# Patient Record
Sex: FEMALE | Race: WHITE | NOT HISPANIC OR LATINO | Employment: FULL TIME | URBAN - METROPOLITAN AREA
[De-identification: names, ages, dates, MRNs, and addresses within clinical notes are randomized per-mention and may not be internally consistent; named-entity substitution may affect disease eponyms.]

---

## 2017-01-03 ENCOUNTER — OFFICE VISIT (OUTPATIENT)
Dept: INTERNAL MEDICINE | Facility: CLINIC | Age: 57
End: 2017-01-03

## 2017-01-03 VITALS
BODY MASS INDEX: 18.29 KG/M2 | TEMPERATURE: 98.2 F | WEIGHT: 109.8 LBS | SYSTOLIC BLOOD PRESSURE: 99 MMHG | OXYGEN SATURATION: 98 % | HEIGHT: 65 IN | DIASTOLIC BLOOD PRESSURE: 62 MMHG | HEART RATE: 94 BPM

## 2017-01-03 DIAGNOSIS — I21.4 NSTEMI (NON-ST ELEVATED MYOCARDIAL INFARCTION) (H): ICD-10-CM

## 2017-01-03 DIAGNOSIS — J96.01 ACUTE RESPIRATORY FAILURE WITH HYPOXIA (H): ICD-10-CM

## 2017-01-03 DIAGNOSIS — Z09 HOSPITAL DISCHARGE FOLLOW-UP: Primary | ICD-10-CM

## 2017-01-03 PROCEDURE — 99214 OFFICE O/P EST MOD 30 MIN: CPT | Performed by: INTERNAL MEDICINE

## 2017-01-03 NOTE — PROGRESS NOTES
"  SUBJECTIVE:                                                    Bailee Dixon is a 56 year old female who presents to clinic today for the following health issues:      ED/UC Followup:    Facility:  [facility]  Date of visit: 12/17/2016  Reason for visit: cardiomyopath  Current Status: Pt is doing okay now       {additional problems for provider to add:689368}    Problem list and histories reviewed & adjusted, as indicated.  Additional history: {NONE - AS DOCUMENTED:050891::\"as documented\"}    {HIST REVIEW/ LINKS 2:107591}    {PROVIDER CHARTING PREFERENCE:557690}  .  "

## 2017-01-03 NOTE — NURSING NOTE
"Chief Complaint   Patient presents with     RECHECK     Pt wants to discuss with Dr about her concerns       Initial BP 99/62 mmHg  Pulse 94  Temp(Src) 98.2  F (36.8  C) (Oral)  Ht 5' 5\" (1.651 m)  Wt 109 lb 12.8 oz (49.805 kg)  BMI 18.27 kg/m2  SpO2 98%  Breastfeeding? No Estimated body mass index is 18.27 kg/(m^2) as calculated from the following:    Height as of this encounter: 5' 5\" (1.651 m).    Weight as of this encounter: 109 lb 12.8 oz (49.805 kg).  BP completed using cuff size: regular  Carlitos Sales MA      "

## 2017-01-03 NOTE — PROGRESS NOTES
SUBJECTIVE:                                                    Bailee Dixon is a 56 year old female who presents to clinic today for the following health issues:          Hospital Follow-up Visit:    Hospital/Nursing Home/IP Rehab Facility: [facility]  Date of Admission: 12/18/16  Date of Discharge: 12/20/2016  Reason(s) for Admission: SOB            Problems taking medications regularly:  None       Medication changes since discharge: None       Problems adhering to non-medication therapy:  None    Summary of hospitalization:  [facility] discharge summary reviewed  Diagnostic Tests/Treatments reviewed.  Follow up needed: none  Other Healthcare Providers Involved in Patient s Care:         None  Update since discharge: improved.     Post Discharge Medication Reconciliation: discharge medications reconciled, continue medications without change.  Plan of care communicated with patient     Coding guidelines for this visit:  Type of Medical   Decision Making Face-to-Face Visit       within 7 Days of discharge Face-to-Face Visit        within 14 days of discharge   Moderate Complexity 00586 [id number]   High Complexity 02587 [id number]          Patient is seen for a follow up visit.  Recently hospitalized for SOB. Patient had acute respiratory failure and NSTEMI. Was admitted to ICU  Had elevated Troponin. Angiogram did not show stenosis to require stenting. Gradually improved.   Started on statin , aspirin, plavix, coreg and lisinopril. No recurrent symptoms.   Prior to that has had 3 weeks symptoms of cough, phlegm, fever and SOB. Seen in [facility] and treated for pneumonia with Levaquin. Required repeated treatment as symptoms persisted.   Did not have a CXR initially. CXR in the hospital did not show infiltrate.   No prior history of HTN, hyperlipidemia. Has h/o smoking but has quit 5 years ago.   Her BP has been low since started on BP medications and she has stopped taking them a week ago.  "        Problem list and histories reviewed & adjusted, as indicated.  Additional history: none    Problem list, Medication list, Allergies, and Medical/Social/Surgical histories reviewed in EPIC and updated as appropriate.    ROS:  Constitutional, HEENT, cardiovascular, pulmonary, GI, , musculoskeletal, neuro, skin, endocrine and psych systems are negative, except as otherwise noted.    OBJECTIVE:                                                    BP 99/62 mmHg  Pulse 94  Temp(Src) 98.2  F (36.8  C) (Oral)  Ht 5' 5\" (1.651 m)  Wt 109 lb 12.8 oz (49.805 kg)  BMI 18.27 kg/m2  SpO2 98%  Breastfeeding? No  Body mass index is 18.27 kg/(m^2).  GENERAL: healthy, alert and no distress  NECK: no adenopathy, no asymmetry, masses, or scars and thyroid normal to palpation  RESP: lungs clear to auscultation - no rales, rhonchi or wheezes  CV: regular rate and rhythm, normal S1 S2, no S3 or S4, no murmur, click or rub, no peripheral edema and peripheral pulses strong  ABDOMEN: soft, nontender, no hepatosplenomegaly, no masses and bowel sounds normal  MS: no gross musculoskeletal defects noted, no edema    Diagnostic Test Results:  none      ASSESSMENT/PLAN:                                                      Problem List Items Addressed This Visit     Acute respiratory failure (H)    NSTEMI (non-ST elevated myocardial infarction) (H)      Other Visit Diagnoses     Hospital discharge follow-up    -  Primary            Will hold Coreg and Lisinopril - low BP.   Continue ASA, Plavix and Lipitor post MI  MI likely related to acute inflammation triggered by viral / bacterial respiratory infection with resultant respiratory failure , hypoxia   Recheck ECHO in 2 months     Follow-Up:in 2 months     Robert Singh MD  [facility]    "

## 2017-01-19 ENCOUNTER — MYC MEDICAL ADVICE (OUTPATIENT)
Dept: INTERNAL MEDICINE | Facility: CLINIC | Age: 57
End: 2017-01-19

## 2017-01-20 NOTE — TELEPHONE ENCOUNTER
Pt wondering what prescriptions were ordered at her visits on 12/2/16 and 12/14/16. Message sent relaying requested information.

## 2017-02-27 ENCOUNTER — TELEPHONE (OUTPATIENT)
Dept: PHARMACY | Facility: CLINIC | Age: 57
End: 2017-02-27

## 2017-02-27 NOTE — TELEPHONE ENCOUNTER
Called to follow-up on GILL visit.  Left message for patient to call back.    Hien Bonilla , Pharm D  [phone] (phone)  [phone] (pager)  Medication Therapy Management Pharmacist

## 2017-03-20 ENCOUNTER — TELEPHONE (OUTPATIENT)
Dept: INTERNAL MEDICINE | Facility: CLINIC | Age: 57
End: 2017-03-20

## 2017-03-20 NOTE — TELEPHONE ENCOUNTER
Panel Management Review      Patient has the following on her problem list: None      Composite cancer screening  Chart review shows that this patient is due/due soon for the following Colonoscopy  Summary:    Patient is due/failing the following:   COLONOSCOPY    Action needed:   Patient needs referral/order: Colonoscopy    Type of outreach:    Phone, left message for patient to call back.     Questions for provider review:    None                                                                                                                                    Yvonne Hassan MA       Chart routed to none .

## 2017-03-20 NOTE — LETTER
[facility]  [address], [address]  [place]  [address]      March 24, 2017    Bailee Dixon                                                                                                                                                       [address] [address] [address]              Dear Bailee,      At [facility] we care about your health and well-being. In order to ensure we are providing the best quality care, we have reviewed your chart and see that you are due for:    1. Colonoscopy    If you have already had one or all of the above screening tests at another facility, please contact our office to update your chart at [phone].    GI : [phone] to schedule Colonoscopy               Sincerely,      Robert Singh M.D.

## 2017-04-10 ENCOUNTER — OFFICE VISIT (OUTPATIENT)
Dept: INTERNAL MEDICINE | Facility: CLINIC | Age: 57
End: 2017-04-10
Payer: COMMERCIAL

## 2017-04-10 ENCOUNTER — RADIANT APPOINTMENT (OUTPATIENT)
Dept: GENERAL RADIOLOGY | Facility: CLINIC | Age: 57
End: 2017-04-10
Attending: INTERNAL MEDICINE
Payer: COMMERCIAL

## 2017-04-10 VITALS
HEART RATE: 107 BPM | TEMPERATURE: 98.2 F | BODY MASS INDEX: 18.99 KG/M2 | SYSTOLIC BLOOD PRESSURE: 114 MMHG | WEIGHT: 114 LBS | HEIGHT: 65 IN | DIASTOLIC BLOOD PRESSURE: 72 MMHG | OXYGEN SATURATION: 97 %

## 2017-04-10 DIAGNOSIS — R06.03 RESPIRATORY DISTRESS: ICD-10-CM

## 2017-04-10 DIAGNOSIS — I25.2 HISTORY OF MI (MYOCARDIAL INFARCTION): ICD-10-CM

## 2017-04-10 DIAGNOSIS — B02.9 HERPES ZOSTER WITHOUT COMPLICATION: ICD-10-CM

## 2017-04-10 DIAGNOSIS — J06.9 UPPER RESPIRATORY TRACT INFECTION, UNSPECIFIED TYPE: Primary | ICD-10-CM

## 2017-04-10 PROCEDURE — 99214 OFFICE O/P EST MOD 30 MIN: CPT | Performed by: INTERNAL MEDICINE

## 2017-04-10 PROCEDURE — 71020 XR CHEST 2 VW: CPT

## 2017-04-10 RX ORDER — CODEINE PHOSPHATE AND GUAIFENESIN 10; 100 MG/5ML; MG/5ML
1 SOLUTION ORAL EVERY 4 HOURS PRN
Qty: 120 ML | Refills: 0 | Status: SHIPPED | OUTPATIENT
Start: 2017-04-10 | End: 2017-05-05

## 2017-04-10 RX ORDER — LORAZEPAM 0.5 MG/1
0.5 TABLET ORAL EVERY 8 HOURS PRN
Qty: 15 TABLET | Refills: 0 | Status: SHIPPED | OUTPATIENT
Start: 2017-04-10 | End: 2017-05-05

## 2017-04-10 RX ORDER — ALBUTEROL SULFATE 90 UG/1
2 AEROSOL, METERED RESPIRATORY (INHALATION) EVERY 6 HOURS PRN
Qty: 1 INHALER | Refills: 0 | Status: SHIPPED | OUTPATIENT
Start: 2017-04-10 | End: 2017-05-05 | Stop reason: ALTCHOICE

## 2017-04-10 NOTE — NURSING NOTE
"Chief Complaint   Patient presents with     Cough     Pt C/O cough for over a week, worst at night, SOB and wheezing     Derm Problem     Rash on Lt torso, possible shingles       Initial /72  Pulse 107  Temp 98.2  F (36.8  C) (Oral)  Ht 5' 5\" (1.651 m)  Wt 114 lb (51.7 kg)  SpO2 97%  BMI 18.97 kg/m2 Estimated body mass index is 18.97 kg/(m^2) as calculated from the following:    Height as of this encounter: 5' 5\" (1.651 m).    Weight as of this encounter: 114 lb (51.7 kg).  Medication Reconciliation: complete   Yvonne Hassan MA      "

## 2017-04-10 NOTE — MR AVS SNAPSHOT
After Visit Summary   4/10/2017    Bailee Dixon    MRN: [id number]           Patient Information     Date Of Birth          1960        Visit Information        Provider Department      4/10/2017 11:20 AM Robert Singh MD [facility]        Today's Diagnoses     Upper respiratory tract infection, unspecified type    -  1    Respiratory distress        Herpes zoster without complication        History of MI (myocardial infarction)           Follow-ups after your visit        Your next 10 appointments already scheduled     Apr 19, 2017  9:00 AM CDT   Ech Complete with [facility] ([facility])    [address]  [facility] [address] [phone]           1.  Please bring or wear a comfortable two-piece outfit. 2.  You may eat, drink and take your normal medicines. 3.  For any questions that cannot be answered, please contact the ordering physician ***Please check-in at the [facility] Registration Office located in [address] in the [facility] building. When you are finished registering, please go to [address] and have a seat. The technician will call your name for the test.            Apr 19, 2017 10:00 AM CDT   LAB with RU LAB   [facility] PHYSICIANS [facility] ([facility])    [address]  [facility] [id number] [phone]           Patient must bring picture ID.  Patient should be prepared to give a urine specimen  Please do not eat 10-12 hours before your appointment if you are coming in fasting for labs on lipids, cholesterol, or glucose (sugar).  Pregnant women should follow their Care Team instructions. Water with medications is okay. Do not drink coffee or other fluids.   If you have concerns about taking  your medications, please ask at office or if scheduling via [employer], send a message by clicking on Secure Messaging, Message  "Your Care Team.            May 11, 2017 12:45 PM CDT   New Visit with Julius Peres MD   [facility] [place] ([facility])    [address]  [facility] [address] [phone]              Who to contact     If you have questions or need follow up information about today's clinic visit or your schedule please contact [facility] directly at [phone].  Normal or non-critical lab and imaging results will be communicated to you by [employer], letter or phone within 4 business days after the clinic has received the results. If you do not hear from us within 7 days, please contact the clinic through [employer] or phone. If you have a critical or abnormal lab result, we will notify you by phone as soon as possible.  Submit refill requests through [employer] or call your pharmacy and they will forward the refill request to us. Please allow 3 business days for your refill to be completed.          Additional Information About Your Visit        [employer] Information     [employer] gives you secure access to your electronic health record. If you see a primary care provider, you can also send messages to your care team and make appointments. If you have questions, please call your primary care clinic.  If you do not have a primary care provider, please call [phone] and they will assist you.        Care EveryWhere ID     This is your Care EveryWhere ID. This could be used by other organizations to access your [facility] medical records  [id number]        Your Vitals Were     Pulse Temperature Height Pulse Oximetry BMI (Body Mass Index)       107 98.2  F (36.8  C) (Oral) 5' 5\" (1.651 m) 97% 18.97 kg/m2        Blood Pressure from Last 3 Encounters:   04/10/17 114/72   01/03/17 99/62   12/20/16 121/75    Weight from Last 3 Encounters:   04/10/17 114 lb (51.7 kg)   01/03/17 109 lb 12.8 oz (49.8 kg)   12/20/16 111 lb 14.4 oz (50.8 kg)                 Today's " Medication Changes          These changes are accurate as of: 4/10/17  2:48 PM.  If you have any questions, ask your nurse or doctor.               Start taking these medicines.        Dose/Directions    amoxicillin-clavulanate 875-125 MG per tablet   Commonly known as:  AUGMENTIN   Used for:  Upper respiratory tract infection, unspecified type   Started by:  Robert Singh MD        Dose:  1 tablet   Take 1 tablet by mouth 2 times daily   Quantity:  20 tablet   Refills:  0       guaiFENesin-codeine 100-10 MG/5ML Soln solution   Commonly known as:  ROBITUSSIN AC   Used for:  Upper respiratory tract infection, unspecified type   Started by:  Robert Singh MD        Dose:  1 tsp.   Take 5 mLs by mouth every 4 hours as needed for cough   Quantity:  120 mL   Refills:  0         These medicines have changed or have updated prescriptions.        Dose/Directions    * albuterol 108 (90 BASE) MCG/ACT Inhaler   Commonly known as:  PROAIR HFA/PROVENTIL HFA/VENTOLIN HFA   This may have changed:  Another medication with the same name was added. Make sure you understand how and when to take each.   Used for:  Pneumonia of left lower lobe due to infectious organism   Changed by:  Hien Bonilla, [facility]        Dose:  2 puff   Inhale 2 puffs into the lungs every 6 hours as needed for shortness of breath / dyspnea or wheezing   Quantity:  1 Inhaler   Refills:  0       * albuterol 108 (90 BASE) MCG/ACT Inhaler   Commonly known as:  PROAIR HFA/PROVENTIL HFA/VENTOLIN HFA   This may have changed:  You were already taking a medication with the same name, and this prescription was added. Make sure you understand how and when to take each.   Used for:  Upper respiratory tract infection, unspecified type   Changed by:  Robert Singh MD        Dose:  2 puff   Inhale 2 puffs into the lungs every 6 hours as needed for shortness of breath / dyspnea or wheezing   Quantity:  1 Inhaler   Refills:  0       * Notice:  This list has 2  medication(s) that are the same as other medications prescribed for you. Read the directions carefully, and ask your doctor or other care provider to review them with you.         Where to get your medicines      These medications were sent to [facility]/pharmacy #[id number] - [place], [place] - [address] [address]  [address] [address] [address]     Phone:  [phone]     albuterol 108 (90 BASE) MCG/ACT Inhaler    amoxicillin-clavulanate 875-125 MG per tablet         Some of these will need a paper prescription and others can be bought over the counter.  Ask your nurse if you have questions.     Bring a paper prescription for each of these medications     guaiFENesin-codeine 100-10 MG/5ML Soln solution    LORazepam 0.5 MG tablet                Primary Care Provider Office Phone # Fax #    Robert Singh [phone]       [facility] [address] [place] [address]        Thank you!     Thank you for choosing [facility]  for your care. Our goal is always to provide you with excellent care. Hearing back from our patients is one way we can continue to improve our services. Please take a few minutes to complete the written survey that you may receive in the mail after your visit with us. Thank you!             Your Updated Medication List - Protect others around you: Learn how to safely use, store and throw away your medicines at www.disposemymeds.org.          This list is accurate as of: 4/10/17  2:48 PM.  Always use your most recent med list.                   Brand Name Dispense Instructions for use    * albuterol 108 (90 BASE) MCG/ACT Inhaler    PROAIR HFA/PROVENTIL HFA/VENTOLIN HFA    1 Inhaler    Inhale 2 puffs into the lungs every 6 hours as needed for shortness of breath / dyspnea or wheezing       * albuterol 108 (90 BASE) MCG/ACT Inhaler    PROAIR HFA/PROVENTIL HFA/VENTOLIN HFA    1 Inhaler    Inhale 2 puffs into the lungs every 6 hours as needed for  shortness of breath / dyspnea or wheezing       amoxicillin-clavulanate 875-125 MG per tablet    AUGMENTIN    20 tablet    Take 1 tablet by mouth 2 times daily       aspirin 81 MG EC tablet     30 tablet    Take 1 tablet (81 mg) by mouth daily       atorvastatin 40 MG tablet    LIPITOR    30 tablet    Take 1 tablet (40 mg) by mouth daily       carvedilol 3.125 MG tablet    COREG    60 tablet    Take 1 tablet (3.125 mg) by mouth 2 times daily (with meals)       clopidogrel 75 MG tablet    PLAVIX    30 tablet    Take 1 tablet (75 mg) by mouth daily       EPINEPHrine 0.3 MG/0.3ML injection     1 each    Inject 0.3 mLs (0.3 mg) into the muscle once as needed for anaphylaxis       guaiFENesin-codeine 100-10 MG/5ML Soln solution    ROBITUSSIN AC    120 mL    Take 5 mLs by mouth every 4 hours as needed for cough       lisinopril 2.5 MG tablet    PRINIVIL/Zestril    30 tablet    Take 1 tablet (2.5 mg) by mouth daily       LORazepam 0.5 MG tablet    ATIVAN    15 tablet    Take 1 tablet (0.5 mg) by mouth every 8 hours as needed for anxiety       PREMARIN 0.625 MG tablet   Generic drug:  estrogens (conjugated)     90    1 daily       * Notice:  This list has 2 medication(s) that are the same as other medications prescribed for you. Read the directions carefully, and ask your doctor or other care provider to review them with you.

## 2017-04-10 NOTE — PROGRESS NOTES
SUBJECTIVE:                                                    Bailee Dixon is a 57 year old female who presents to clinic today for the following health issues:      Cough, SOB and Wheezing:    Patient presents with cold symptoms for 7 days. Has sore throat, nasal congestion. Has cough productive of yellow phlegm. Has headache. Chest hurts with the coughing. No fever. No SOB.   Has h/o ischemic heart disease, asymptomatic regarding chest pains, SOB,palpitations. Has good compliance with treatment, diet and exercise. Had an MI in the setting of respiratory failure with URI. Negative angiogram.     Has developed a rash 4 days ago , in the left lateral chest, painful, burning, blisters.     PROBLEMS TO ADD ON...  Has stopped Lipitor because of muscles and joints aches.   Has occasional cigarette.     Problem list and histories reviewed & adjusted, as indicated.  Additional history: as documented    Patient Active Problem List   Diagnosis     Migraine without aura     CARDIOVASCULAR SCREENING; LDL GOAL LESS THAN 160     Smoking     Chronic low back pain     Acute respiratory failure (H)     NSTEMI (non-ST elevated myocardial infarction) (H)     Past Surgical History:   Procedure Laterality Date     C NONSPECIFIC PROCEDURE  1989    ALEX/BSO for endometriosis     C NONSPECIFIC PROCEDURE      T + A     C NONSPECIFIC PROCEDURE  1989    appy with ALEX       HYSTERECTOMY, PAP NO LONGER INDICATED         Social History   Substance Use Topics     Smoking status: Former Smoker     Packs/day: 1.00     Years: 21.00     Types: Cigarettes     Quit date: 10/17/2011     Smokeless tobacco: Never Used      Comment: started 1990 per pt     Alcohol use 0.0 oz/week     0 Standard drinks or equivalent per week      Comment: 2-3 drinks weekly     Family History   Problem Relation Age of Onset     Arthritis Mother      SLE     DIABETES Maternal Grandmother          Current Outpatient Prescriptions   Medication Sig Dispense Refill      LORazepam (ATIVAN) 0.5 MG tablet Take 1 tablet (0.5 mg) by mouth every 8 hours as needed for anxiety 15 tablet 0     amoxicillin-clavulanate (AUGMENTIN) 875-125 MG per tablet Take 1 tablet by mouth 2 times daily 20 tablet 0     guaiFENesin-codeine (ROBITUSSIN AC) 100-10 MG/5ML SOLN solution Take 5 mLs by mouth every 4 hours as needed for cough 120 mL 0     albuterol (PROAIR HFA/PROVENTIL HFA/VENTOLIN HFA) 108 (90 BASE) MCG/ACT Inhaler Inhale 2 puffs into the lungs every 6 hours as needed for shortness of breath / dyspnea or wheezing 1 Inhaler 0     aspirin EC 81 MG EC tablet Take 1 tablet (81 mg) by mouth daily 30 tablet 1     EPINEPHrine (EPIPEN) 0.3 MG/0.3ML injection Inject 0.3 mLs (0.3 mg) into the muscle once as needed for anaphylaxis 1 each 1     albuterol (PROAIR HFA/PROVENTIL HFA/VENTOLIN HFA) 108 (90 BASE) MCG/ACT Inhaler Inhale 2 puffs into the lungs every 6 hours as needed for shortness of breath / dyspnea or wheezing (Patient not taking: Reported on 4/10/2017) 1 Inhaler 0     atorvastatin (LIPITOR) 40 MG tablet Take 1 tablet (40 mg) by mouth daily (Patient not taking: Reported on 4/10/2017) 30 tablet 1     lisinopril (PRINIVIL/ZESTRIL) 2.5 MG tablet Take 1 tablet (2.5 mg) by mouth daily (Patient not taking: Reported on 4/10/2017) 30 tablet 1     carvedilol (COREG) 3.125 MG tablet Take 1 tablet (3.125 mg) by mouth 2 times daily (with meals) (Patient not taking: Reported on 4/10/2017) 60 tablet 1     clopidogrel (PLAVIX) 75 MG tablet Take 1 tablet (75 mg) by mouth daily (Patient not taking: Reported on 4/10/2017) 30 tablet 1     PREMARIN 0.625 MG OR TABS 1 daily (Patient not taking: No sig reported) 90 3       Reviewed and updated as needed this visit by clinical staff       Reviewed and updated as needed this visit by Provider         ROS:  Constitutional, HEENT, cardiovascular, pulmonary, GI, , musculoskeletal, neuro, skin, endocrine and psych systems are negative, except as otherwise  "noted.    OBJECTIVE:                                                    /72  Pulse 107  Temp 98.2  F (36.8  C) (Oral)  Ht 5' 5\" (1.651 m)  Wt 114 lb (51.7 kg)  SpO2 97%  BMI 18.97 kg/m2  Body mass index is 18.97 kg/(m^2).  GENERAL: healthy, alert and no distress  NECK: no adenopathy, no asymmetry, masses, or scars and thyroid normal to palpation  RESP: lungs clear to auscultation - no rales, rhonchi or wheezes  CV: regular rate and rhythm, normal S1 S2, no S3 or S4, no murmur, click or rub, no peripheral edema and peripheral pulses strong  ABDOMEN: soft, nontender, no hepatosplenomegaly, no masses and bowel sounds normal  MS: no gross musculoskeletal defects noted, no edema  SKIN:left lower lateral chest skin rash- erythematous base, 1-2 mm blisters and crusts     Diagnostic Test Results:  none      ASSESSMENT/PLAN:                                                      Problem List Items Addressed This Visit     None      Visit Diagnoses     Upper respiratory tract infection, unspecified type    -  Primary    Relevant Medications    amoxicillin-clavulanate (AUGMENTIN) 875-125 MG per tablet    guaiFENesin-codeine (ROBITUSSIN AC) 100-10 MG/5ML SOLN solution    albuterol (PROAIR HFA/PROVENTIL HFA/VENTOLIN HFA) 108 (90 BASE) MCG/ACT Inhaler    Respiratory distress        Relevant Medications    LORazepam (ATIVAN) 0.5 MG tablet    albuterol (PROAIR HFA/PROVENTIL HFA/VENTOLIN HFA) 108 (90 BASE) MCG/ACT Inhaler    Other Relevant Orders    XR Chest 2 Views (Completed)    Herpes zoster without complication        History of MI (myocardial infarction)               Assess CXR- no infiltrate   Has finding of wheezing, crackles, started on antibiotic , advised of side effects.   Over 4 days after shingles, no benefit of antiviral. PRN analgesics   Follow up with cardiology     Follow-Up:as needed     Robert Singh MD  [facility]    "

## 2017-04-16 DIAGNOSIS — J18.9 PNEUMONIA OF LEFT LOWER LOBE DUE TO INFECTIOUS ORGANISM: ICD-10-CM

## 2017-04-17 RX ORDER — ALBUTEROL SULFATE 90 UG/1
AEROSOL, METERED RESPIRATORY (INHALATION)
Qty: 1 INHALER | Refills: 0 | Status: SHIPPED | OUTPATIENT
Start: 2017-04-17 | End: 2017-05-05

## 2017-04-19 ENCOUNTER — HOSPITAL ENCOUNTER (OUTPATIENT)
Dept: CARDIOLOGY | Facility: CLINIC | Age: 57
Discharge: HOME | End: 2017-04-19
Attending: NURSE PRACTITIONER | Admitting: NURSE PRACTITIONER
Payer: COMMERCIAL

## 2017-04-19 DIAGNOSIS — I42.9 CARDIOMYOPATHY, UNSPECIFIED (H): ICD-10-CM

## 2017-04-19 PROCEDURE — 93306 TTE W/DOPPLER COMPLETE: CPT | Mod: 26 | Performed by: INTERNAL MEDICINE

## 2017-04-19 PROCEDURE — 93306 TTE W/DOPPLER COMPLETE: CPT

## 2017-05-05 ENCOUNTER — OFFICE VISIT (OUTPATIENT)
Dept: INTERNAL MEDICINE | Facility: CLINIC | Age: 57
End: 2017-05-05
Payer: COMMERCIAL

## 2017-05-05 VITALS
DIASTOLIC BLOOD PRESSURE: 68 MMHG | HEART RATE: 105 BPM | SYSTOLIC BLOOD PRESSURE: 128 MMHG | WEIGHT: 108 LBS | BODY MASS INDEX: 17.99 KG/M2 | TEMPERATURE: 98.5 F | HEIGHT: 65 IN | OXYGEN SATURATION: 95 %

## 2017-05-05 DIAGNOSIS — J18.9 PNEUMONIA OF LEFT LOWER LOBE DUE TO INFECTIOUS ORGANISM: ICD-10-CM

## 2017-05-05 DIAGNOSIS — F41.9 ANXIETY: ICD-10-CM

## 2017-05-05 DIAGNOSIS — R06.03 RESPIRATORY DISTRESS: ICD-10-CM

## 2017-05-05 DIAGNOSIS — J98.01 ACUTE BRONCHOSPASM: Primary | ICD-10-CM

## 2017-05-05 PROCEDURE — 99214 OFFICE O/P EST MOD 30 MIN: CPT | Performed by: INTERNAL MEDICINE

## 2017-05-05 RX ORDER — ALBUTEROL SULFATE 90 UG/1
AEROSOL, METERED RESPIRATORY (INHALATION)
Qty: 1 INHALER | Refills: 3 | Status: SHIPPED | OUTPATIENT
Start: 2017-05-05 | End: 2018-01-15

## 2017-05-05 RX ORDER — PREDNISONE 20 MG/1
40 TABLET ORAL DAILY
Qty: 20 TABLET | Refills: 0 | Status: SHIPPED | OUTPATIENT
Start: 2017-05-05 | End: 2017-05-17

## 2017-05-05 RX ORDER — LORAZEPAM 0.5 MG/1
0.5 TABLET ORAL EVERY 8 HOURS PRN
Qty: 15 TABLET | Refills: 0 | Status: SHIPPED | OUTPATIENT
Start: 2017-05-05 | End: 2017-12-19

## 2017-05-05 RX ORDER — CODEINE PHOSPHATE AND GUAIFENESIN 10; 100 MG/5ML; MG/5ML
1 SOLUTION ORAL EVERY 4 HOURS PRN
Qty: 120 ML | Refills: 0 | Status: SHIPPED | OUTPATIENT
Start: 2017-05-05 | End: 2017-12-19

## 2017-05-05 NOTE — MR AVS SNAPSHOT
After Visit Summary   5/5/2017    Bailee Dixon    MRN: [id number]           Patient Information     Date Of Birth          1960        Visit Information        Provider Department      5/5/2017 3:20 PM Robert Singh MD [facility]        Today's Diagnoses     Acute bronchospasm    -  1    Pneumonia of left lower lobe due to infectious organism        Respiratory distress           Follow-ups after your visit        Your next 10 appointments already scheduled     May 11, 2017 12:45 PM CDT   New Visit with Julius Peres MD   [facility]    [address]  [facility] [address] [phone]              Who to contact     If you have questions or need follow up information about today's clinic visit or your schedule please contact [facility] directly at [phone].  Normal or non-critical lab and imaging results will be communicated to you by MyChart, letter or phone within 4 business days after the clinic has received the results. If you do not hear from us within 7 days, please contact the clinic through MyChart or phone. If you have a critical or abnormal lab result, we will notify you by phone as soon as possible.  Submit refill requests through [facility] or call your pharmacy and they will forward the refill request to us. Please allow 3 business days for your refill to be completed.          Additional Information About Your Visit        MyChart Information     [facility] gives you secure access to your electronic health record. If you see a primary care provider, you can also send messages to your care team and make appointments. If you have questions, please call your primary care clinic.  If you do not have a primary care provider, please call [phone] and they will assist you.        Care EveryWhere ID     This is your Care EveryWhere ID. This could be used by  "other organizations to access your [place] medical records  [id number]        Your Vitals Were     Pulse Temperature Height Pulse Oximetry BMI (Body Mass Index)       105 98.5  F (36.9  C) (Oral) 5' 5\" (1.651 m) 95% 17.97 kg/m2        Blood Pressure from Last 3 Encounters:   05/05/17 128/68   04/10/17 114/72   01/03/17 99/62    Weight from Last 3 Encounters:   05/05/17 108 lb (49 kg)   04/10/17 114 lb (51.7 kg)   01/03/17 109 lb 12.8 oz (49.8 kg)              Today, you had the following     No orders found for display         Today's Medication Changes          These changes are accurate as of: 5/5/17  3:41 PM.  If you have any questions, ask your nurse or doctor.               Start taking these medicines.        Dose/Directions    guaiFENesin-codeine 100-10 MG/5ML Soln solution   Commonly known as:  ROBITUSSIN AC   Used for:  Acute bronchospasm   Started by:  Robert Singh MD        Dose:  1 tsp.   Take 5 mLs by mouth every 4 hours as needed for cough   Quantity:  120 mL   Refills:  0       mometasone-formoterol 100-5 MCG/ACT oral inhaler   Commonly known as:  DULERA   Used for:  Acute bronchospasm   Started by:  Robert Singh MD        Dose:  2 puff   Inhale 2 puffs into the lungs 2 times daily   Quantity:  13 g   Refills:  1       predniSONE 20 MG tablet   Commonly known as:  DELTASONE   Used for:  Acute bronchospasm   Started by:  Robert Singh MD        Dose:  40 mg   Take 2 tablets (40 mg) by mouth daily for 12 days Start 40 mg, taper down by 10 mg every 3 days   Quantity:  20 tablet   Refills:  0         These medicines have changed or have updated prescriptions.        Dose/Directions    albuterol 108 (90 BASE) MCG/ACT Inhaler   Commonly known as:  VENTOLIN HFA   This may have changed:  See the new instructions.   Used for:  Pneumonia of left lower lobe due to infectious organism   Changed by:  Robert Singh MD        INHALE 2 PUFFS INTO THE LUNGS EVERY 6 HOURS AS NEEDED FOR " SHORTNESS OF BREATH / DYSPNEA OR WHEEZING   Quantity:  1 Inhaler   Refills:  3            Where to get your medicines      These medications were sent to [facility]/pharmacy #[id number] - [place], [place] - [address]  [address] [facility] [address]     Phone:  [phone]     albuterol 108 (90 BASE) MCG/ACT Inhaler    mometasone-formoterol 100-5 MCG/ACT oral inhaler    predniSONE 20 MG tablet         Some of these will need a paper prescription and others can be bought over the counter.  Ask your nurse if you have questions.     Bring a paper prescription for each of these medications     guaiFENesin-codeine 100-10 MG/5ML Soln solution    LORazepam 0.5 MG tablet                Primary Care Provider Office Phone # Fax #    Robert Singh [phone]       [facility] [address] [facility] [address]        Thank you!     Thank you for choosing [facility]  for your care. Our goal is always to provide you with excellent care. Hearing back from our patients is one way we can continue to improve our services. Please take a few minutes to complete the written survey that you may receive in the mail after your visit with us. Thank you!             Your Updated Medication List - Protect others around you: Learn how to safely use, store and throw away your medicines at www.disposemymeds.org.          This list is accurate as of: 5/5/17  3:41 PM.  Always use your most recent med list.                   Brand Name Dispense Instructions for use    albuterol 108 (90 BASE) MCG/ACT Inhaler    VENTOLIN HFA    1 Inhaler    INHALE 2 PUFFS INTO THE LUNGS EVERY 6 HOURS AS NEEDED FOR SHORTNESS OF BREATH / DYSPNEA OR WHEEZING       aspirin 81 MG EC tablet     30 tablet    Take 1 tablet (81 mg) by mouth daily       atorvastatin 40 MG tablet    LIPITOR    30 tablet    Take 1 tablet (40 mg) by mouth daily       carvedilol 3.125 MG tablet    COREG    60 tablet    Take 1 tablet  (3.125 mg) by mouth 2 times daily (with meals)       clopidogrel 75 MG tablet    PLAVIX    30 tablet    Take 1 tablet (75 mg) by mouth daily       EPINEPHrine 0.3 MG/0.3ML injection     1 each    Inject 0.3 mLs (0.3 mg) into the muscle once as needed for anaphylaxis       guaiFENesin-codeine 100-10 MG/5ML Soln solution    ROBITUSSIN AC    120 mL    Take 5 mLs by mouth every 4 hours as needed for cough       lisinopril 2.5 MG tablet    PRINIVIL/Zestril    30 tablet    Take 1 tablet (2.5 mg) by mouth daily       LORazepam 0.5 MG tablet    ATIVAN    15 tablet    Take 1 tablet (0.5 mg) by mouth every 8 hours as needed for anxiety       mometasone-formoterol 100-5 MCG/ACT oral inhaler    DULERA    13 g    Inhale 2 puffs into the lungs 2 times daily       predniSONE 20 MG tablet    DELTASONE    20 tablet    Take 2 tablets (40 mg) by mouth daily for 12 days Start 40 mg, taper down by 10 mg every 3 days       PREMARIN 0.625 MG tablet   Generic drug:  estrogens (conjugated)     90    1 daily

## 2017-05-05 NOTE — NURSING NOTE
"Chief Complaint   Patient presents with     Cough     Pt C/O cough, SOB/wheezing/ratlling for 3 weeks       Initial /68  Pulse 105  Temp 98.5  F (36.9  C) (Oral)  Ht 5' 5\" (1.651 m)  Wt 108 lb (49 kg)  SpO2 95%  BMI 17.97 kg/m2 Estimated body mass index is 17.97 kg/(m^2) as calculated from the following:    Height as of this encounter: 5' 5\" (1.651 m).    Weight as of this encounter: 108 lb (49 kg).  Medication Reconciliation: complete   Yvonne Hassan MA      "

## 2017-05-05 NOTE — PROGRESS NOTES
SUBJECTIVE:                                                    Bailee Dixon is a 57 year old female who presents to clinic today for the following health issues:      Cough, SOB, Wheezing:    Patient is seen for a follow up visit.  Concern for recurrent cough, SOB, wheezing. Has had URI , treated with antibiotic, improved, CXR negative. Past 2-3 days again developed dry cough. In creased SOB. No chest pain, no fever. No nasal congestion, HA.   Has h/o smoking. Has quit 1 month ago. Has X rays changes of emphysema.   Takes Albuterol several times during the night. Symptoms are worse at night.   Has h/o anxiety. On Ativan , takes PRN and is helping.   Has had a demand related MI after having URI. Follow up ECHO was normal. Has quit all BP and cholesterol medications because of side effects. Takes daily aspirin.       Problem list and histories reviewed & adjusted, as indicated.  Additional history: as documented    Patient Active Problem List   Diagnosis     Migraine without aura     CARDIOVASCULAR SCREENING; LDL GOAL LESS THAN 160     Smoking     Chronic low back pain     Acute respiratory failure (H)     NSTEMI (non-ST elevated myocardial infarction) (H)     Anxiety     Past Surgical History:   Procedure Laterality Date     C NONSPECIFIC PROCEDURE  1989    ALEX/BSO for endometriosis     C NONSPECIFIC PROCEDURE      T + A     C NONSPECIFIC PROCEDURE  1989    appy with ALEX       HYSTERECTOMY, PAP NO LONGER INDICATED         Social History   Substance Use Topics     Smoking status: Former Smoker     Packs/day: 1.00     Years: 21.00     Types: Cigarettes     Quit date: 10/17/2011     Smokeless tobacco: Never Used      Comment: started 1990 per pt, occasionally now     Alcohol use 0.0 oz/week     0 Standard drinks or equivalent per week      Comment: 2-3 drinks weekly     Family History   Problem Relation Age of Onset     Arthritis Mother      SLE     DIABETES Maternal Grandmother          Current Outpatient  Prescriptions   Medication Sig Dispense Refill     albuterol (VENTOLIN HFA) 108 (90 BASE) MCG/ACT Inhaler INHALE 2 PUFFS INTO THE LUNGS EVERY 6 HOURS AS NEEDED FOR SHORTNESS OF BREATH / DYSPNEA OR WHEEZING 1 Inhaler 3     LORazepam (ATIVAN) 0.5 MG tablet Take 1 tablet (0.5 mg) by mouth every 8 hours as needed for anxiety 15 tablet 0     predniSONE (DELTASONE) 20 MG tablet Take 2 tablets (40 mg) by mouth daily for 12 days Start 40 mg, taper down by 10 mg every 3 days 20 tablet 0     mometasone-formoterol (DULERA) 100-5 MCG/ACT oral inhaler Inhale 2 puffs into the lungs 2 times daily 13 g 1     guaiFENesin-codeine (ROBITUSSIN AC) 100-10 MG/5ML SOLN solution Take 5 mLs by mouth every 4 hours as needed for cough 120 mL 0     EPINEPHrine (EPIPEN) 0.3 MG/0.3ML injection Inject 0.3 mLs (0.3 mg) into the muscle once as needed for anaphylaxis 1 each 1     [DISCONTINUED] VENTOLIN  (90 BASE) MCG/ACT Inhaler INHALE 2 PUFFS INTO THE LUNGS EVERY 6 HOURS AS NEEDED FOR SHORTNESS OF BREATH / DYSPNEA OR WHEEZING 1 Inhaler 0     [DISCONTINUED] albuterol (PROAIR HFA/PROVENTIL HFA/VENTOLIN HFA) 108 (90 BASE) MCG/ACT Inhaler Inhale 2 puffs into the lungs every 6 hours as needed for shortness of breath / dyspnea or wheezing (Patient not taking: Reported on 5/5/2017) 1 Inhaler 0     aspirin EC 81 MG EC tablet Take 1 tablet (81 mg) by mouth daily 30 tablet 1     atorvastatin (LIPITOR) 40 MG tablet Take 1 tablet (40 mg) by mouth daily (Patient not taking: Reported on 4/10/2017) 30 tablet 1     lisinopril (PRINIVIL/ZESTRIL) 2.5 MG tablet Take 1 tablet (2.5 mg) by mouth daily (Patient not taking: Reported on 4/10/2017) 30 tablet 1     carvedilol (COREG) 3.125 MG tablet Take 1 tablet (3.125 mg) by mouth 2 times daily (with meals) (Patient not taking: Reported on 4/10/2017) 60 tablet 1     clopidogrel (PLAVIX) 75 MG tablet Take 1 tablet (75 mg) by mouth daily (Patient not taking: Reported on 4/10/2017) 30 tablet 1     PREMARIN 0.625 MG OR  "TABS 1 daily (Patient not taking: No sig reported) 90 3       Reviewed and updated as needed this visit by clinical staff       Reviewed and updated as needed this visit by Provider         ROS:  Constitutional, HEENT, cardiovascular, pulmonary, gi and gu systems are negative, except as otherwise noted.    OBJECTIVE:                                                    /68  Pulse 105  Temp 98.5  F (36.9  C) (Oral)  Ht 5' 5\" (1.651 m)  Wt 108 lb (49 kg)  SpO2 95%  BMI 17.97 kg/m2  Body mass index is 17.97 kg/(m^2).  GENERAL: healthy, alert and no distress  EYES: Eyes grossly normal to inspection, PERRL and conjunctivae and sclerae normal  HENT: ear canals and TM's normal, nose and mouth without ulcers or lesions  NECK: no adenopathy, no asymmetry, masses, or scars and thyroid normal to palpation  RESP: lungs clear to auscultation - no rales, rhonchi , + coarse expiratory wheezes  CV: regular rate and rhythm, normal S1 S2, no S3 or S4, no murmur, click or rub, no peripheral edema and peripheral pulses strong  ABDOMEN: soft, nontender, no hepatosplenomegaly, no masses and bowel sounds normal  MS: no gross musculoskeletal defects noted, no edema    Diagnostic Test Results:  none      ASSESSMENT/PLAN:                                                      Problem List Items Addressed This Visit     Anxiety    Relevant Medications    LORazepam (ATIVAN) 0.5 MG tablet      Other Visit Diagnoses     Acute bronchospasm    -  Primary    Relevant Medications    predniSONE (DELTASONE) 20 MG tablet    mometasone-formoterol (DULERA) 100-5 MCG/ACT oral inhaler    guaiFENesin-codeine (ROBITUSSIN AC) 100-10 MG/5ML SOLN solution    Pneumonia of left lower lobe due to infectious organism        Relevant Medications    albuterol (VENTOLIN HFA) 108 (90 BASE) MCG/ACT Inhaler    mometasone-formoterol (DULERA) 100-5 MCG/ACT oral inhaler    guaiFENesin-codeine (ROBITUSSIN AC) 100-10 MG/5ML SOLN solution    Respiratory distress        " Relevant Medications    albuterol (VENTOLIN HFA) 108 (90 BASE) MCG/ACT Inhaler    LORazepam (ATIVAN) 0.5 MG tablet    mometasone-formoterol (DULERA) 100-5 MCG/ACT oral inhaler           Pt likely has COPD, started on Prednisone taper, inhaled steroid/ LABA , continue PRN Albuterol. No clinical acute infection to need antibiotic.   Reassess if fever or productive cough   Will need PFT once acute symptoms are improved  Cont PRN Ativan  Follow up with cardiology     Follow-Up:in 1 month or PRN     Robert Singh MD  [facility]

## 2017-05-09 ENCOUNTER — PRE VISIT (OUTPATIENT)
Dept: CARDIOLOGY | Facility: CLINIC | Age: 57
End: 2017-05-09

## 2017-05-15 ENCOUNTER — TELEPHONE (OUTPATIENT)
Dept: CARDIOLOGY | Facility: CLINIC | Age: 57
End: 2017-05-15

## 2017-05-15 NOTE — TELEPHONE ENCOUNTER
"Patient is overdue for f/u. She was hospitalized in December 2016. 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 Patient underwent a left heart cath, no mechanical revascularization was needed. Patient was placed on ASA 81 mg daily (indefinitely), Clopidogrel 75 mg daily (for one year), Lipitor 40 mg daily, Coreg 3.125 mg BID and Lisinopril 2.5 mg daily.        Patient has been following up with her PMD. Per PMD notes, patient self-stopped all her cardiac meds, including Lipitor, due to low BP's and side effects. She continues to take ASA and Plavix. See PMD notes from 04-10-17, he recommended patient f/u with our clinic.     Patient's echo from 12-18-16, noted an EF of 30-35%. Recent echo noted an EF of 50-55%. EF has improved.     Pt cancelled her Bmp and FLP/ALT. She also cancelled her appointment with Dr. Peres on 05-11-17.     I left patient a message to return my call.     TGarbers RN  [facility]     "

## 2017-05-18 NOTE — TELEPHONE ENCOUNTER
Patient did not return my call. I called patient, patient was not available. I left another message.     TGarbers RN  [facility]

## 2017-05-31 NOTE — TELEPHONE ENCOUNTER
Patient did not return my messages, encounter closed.     Tanner BRANDT  [facility]

## 2017-06-29 DIAGNOSIS — J98.01 ACUTE BRONCHOSPASM: ICD-10-CM

## 2017-06-29 RX ORDER — MOMETASONE FUROATE AND FORMOTEROL FUMARATE DIHYDRATE 100; 5 UG/1; UG/1
AEROSOL RESPIRATORY (INHALATION)
Qty: 13 INHALER | Refills: 1 | Status: SHIPPED | OUTPATIENT
Start: 2017-06-29 | End: 2017-11-10

## 2017-06-29 NOTE — TELEPHONE ENCOUNTER
Routing refill request to provider for review/approval because:  Per PCP's dictation from 5-5-17:  Pt likely has COPD, started on Prednisone taper, inhaled steroid/ LABA , continue PRN Albuterol. No clinical acute infection to need antibiotic.   Reassess if fever or productive cough   Will need PFT once acute symptoms are improved  Cont PRN Ativan  Follow up with cardiology      Follow-Up:in 1 month or PRN     Please advise, thanks.

## 2017-09-19 ENCOUNTER — TELEPHONE (OUTPATIENT)
Dept: INTERNAL MEDICINE | Facility: CLINIC | Age: 57
End: 2017-09-19

## 2017-11-09 ENCOUNTER — TELEPHONE (OUTPATIENT)
Dept: INTERNAL MEDICINE | Facility: CLINIC | Age: 57
End: 2017-11-09

## 2017-11-09 NOTE — TELEPHONE ENCOUNTER
"Patient Communication Preferences indicate  Do not contact  and/or communication by \"Phone\" is not preferred. Call not required per Outreach team.    "

## 2017-11-10 DIAGNOSIS — J98.01 ACUTE BRONCHOSPASM: ICD-10-CM

## 2017-11-10 RX ORDER — MOMETASONE FUROATE AND FORMOTEROL FUMARATE DIHYDRATE 100; 5 UG/1; UG/1
AEROSOL RESPIRATORY (INHALATION)
Qty: 13 INHALER | Refills: 1 | Status: SHIPPED | OUTPATIENT
Start: 2017-11-10 | End: 2018-01-15

## 2017-11-10 NOTE — TELEPHONE ENCOUNTER
LOV:5/5/17  Dulera  Prescription approved per [facility] Refill Protocol.

## 2017-12-18 ENCOUNTER — TELEPHONE (OUTPATIENT)
Dept: INTERNAL MEDICINE | Facility: CLINIC | Age: 57
End: 2017-12-18

## 2017-12-18 NOTE — TELEPHONE ENCOUNTER
Patient calling with URI asking to be fit into PCP schedule today.  No openings, offered to check partner's schedules but pt is refusing to see another MD.  States PCP told her only to only be seen by him.    She complains of head cold onset x1 week ago now affecting sinuses and settling into chest.  Productive cough of clear to yellow-green phlegm with chest heaviness and wheezing.  Unsure if she has a fever as she has been taking Tylenol.  Denies SOB or chest pain.  Has tried Mucinex, Tylenol, Robitussin cough med which have not been controlling symptoms.  MOUNIKA Tirado R.N.

## 2017-12-18 NOTE — TELEPHONE ENCOUNTER
Patient declined appointments today, offered 3:30pm tomorrow. Scheduled for 3:20pm tomorrow 2/19/17.

## 2017-12-19 ENCOUNTER — OFFICE VISIT (OUTPATIENT)
Dept: INTERNAL MEDICINE | Facility: CLINIC | Age: 57
End: 2017-12-19
Payer: COMMERCIAL

## 2017-12-19 VITALS
HEART RATE: 109 BPM | DIASTOLIC BLOOD PRESSURE: 70 MMHG | WEIGHT: 108 LBS | OXYGEN SATURATION: 99 % | BODY MASS INDEX: 17.99 KG/M2 | TEMPERATURE: 98.3 F | HEIGHT: 65 IN | SYSTOLIC BLOOD PRESSURE: 122 MMHG

## 2017-12-19 DIAGNOSIS — R06.03 RESPIRATORY DISTRESS: ICD-10-CM

## 2017-12-19 DIAGNOSIS — J06.9 UPPER RESPIRATORY TRACT INFECTION, UNSPECIFIED TYPE: Primary | ICD-10-CM

## 2017-12-19 PROCEDURE — 99214 OFFICE O/P EST MOD 30 MIN: CPT | Performed by: INTERNAL MEDICINE

## 2017-12-19 RX ORDER — PREDNISONE 20 MG/1
TABLET ORAL
Qty: 20 TABLET | Refills: 0 | Status: SHIPPED | OUTPATIENT
Start: 2017-12-19 | End: 2018-10-30

## 2017-12-19 RX ORDER — LORAZEPAM 0.5 MG/1
0.5 TABLET ORAL EVERY 8 HOURS PRN
Qty: 15 TABLET | Refills: 0 | Status: SHIPPED | OUTPATIENT
Start: 2017-12-19 | End: 2018-03-10

## 2017-12-19 RX ORDER — CODEINE PHOSPHATE AND GUAIFENESIN 10; 100 MG/5ML; MG/5ML
1 SOLUTION ORAL EVERY 4 HOURS PRN
Qty: 120 ML | Refills: 0 | Status: SHIPPED | OUTPATIENT
Start: 2017-12-19 | End: 2018-10-30

## 2017-12-19 ASSESSMENT — PATIENT HEALTH QUESTIONNAIRE - PHQ9: SUM OF ALL RESPONSES TO PHQ QUESTIONS 1-9: 4

## 2017-12-19 NOTE — NURSING NOTE
"Chief Complaint   Patient presents with     URI   symptoms x 2 weeks, cough ,runny /stuffy nose , facial pain body aches , no noted fevers     Initial /70  Pulse 109  Temp 98.3  F (36.8  C) (Oral)  Ht 5' 5\" (1.651 m)  Wt 108 lb (49 kg)  SpO2 99%  BMI 17.97 kg/m2 Estimated body mass index is 17.97 kg/(m^2) as calculated from the following:    Height as of this encounter: 5' 5\" (1.651 m).    Weight as of this encounter: 108 lb (49 kg).  Medication Reconciliation: complete    "

## 2017-12-19 NOTE — PROGRESS NOTES
SUBJECTIVE:                                                    Bailee Dixon is a 57 year old female who presents to clinic today for the following health issues:  URI , symptoms x 2 weeks , cough ,runny /stuffy nose facial pain , no noted fevers .     Patient presents with cold symptoms for 10 days. Has sore throat, nasal congestion. Has cough productive of yellow phlegm. Has headache. Chest hurts with the coughing. No fever. No SOB.   Symptoms have been fluctuating. Now worse again. Increased productive cough and wheezing noted.   Pt has h/o smoking. Still smokes. Has had recurrent URI. Likely COPD. On inhaled bronchodilators and steroids, not improving.   Coughs at night. Able to ambulate and work .    Has h/o anxiety. On PRN Ativan, helps with symptoms. No side effects .     Problem list and histories reviewed & adjusted, as indicated.  Additional history: none    Patient Active Problem List   Diagnosis     Migraine without aura     CARDIOVASCULAR SCREENING; LDL GOAL LESS THAN 160     Smoking     Chronic low back pain     Acute respiratory failure (H)     NSTEMI (non-ST elevated myocardial infarction) (H)     Anxiety     Past Surgical History:   Procedure Laterality Date     C NONSPECIFIC PROCEDURE  1989    ALEX/BSO for endometriosis     C NONSPECIFIC PROCEDURE      T + A     C NONSPECIFIC PROCEDURE  1989    appy with ALEX       HYSTERECTOMY, PAP NO LONGER INDICATED         Social History   Substance Use Topics     Smoking status: Former Smoker     Packs/day: 1.00     Years: 21.00     Types: Cigarettes     Quit date: 10/17/2011     Smokeless tobacco: Never Used      Comment: started 1990 per pt, occasionally now     Alcohol use 0.0 oz/week     0 Standard drinks or equivalent per week      Comment: 2-3 drinks weekly     Family History   Problem Relation Age of Onset     Arthritis Mother      SLE     DIABETES Maternal Grandmother              ROS:  Constitutional, HEENT, cardiovascular, pulmonary, gi and gu  "systems are negative, except as otherwise noted.      OBJECTIVE:   /70  Pulse 109  Temp 98.3  F (36.8  C) (Oral)  Ht 5' 5\" (1.651 m)  Wt 108 lb (49 kg)  SpO2 99%  BMI 17.97 kg/m2  Body mass index is 17.97 kg/(m^2).   GENERAL: frail, underweight, alert and no distress  EYES: Eyes grossly normal to inspection, PERRL and conjunctivae and sclerae normal  HENT: ear canals and TM's normal, nose and mouth without ulcers or lesions  NECK: no adenopathy, no asymmetry, masses, or scars and thyroid normal to palpation  RESP: lungs  - no rales,coarse bilateral rhonchi and wheezes  CV: regular rate and rhythm, normal S1 S2, no S3 or S4, no murmur, click or rub, no peripheral edema and peripheral pulses strong  ABDOMEN: soft, nontender, no hepatosplenomegaly, no masses and bowel sounds normal  MS: no gross musculoskeletal defects noted, no edema  SKIN: no suspicious lesions or rashes    Diagnostic Test Results:  none     ASSESSMENT/PLAN:     Problem List Items Addressed This Visit     None      Visit Diagnoses     Upper respiratory tract infection, unspecified type    -  Primary    Relevant Medications    amoxicillin-clavulanate (AUGMENTIN) 875-125 MG per tablet    guaiFENesin-codeine (ROBITUSSIN AC) 100-10 MG/5ML SOLN solution    predniSONE (DELTASONE) 20 MG tablet    Respiratory distress        Relevant Medications    LORazepam (ATIVAN) 0.5 MG tablet           Acute bronchitis with bronchospasm, h/o COPD, now increased productive cough, concern for pneumonia  Started on Augmentin, Prednisone taper, mucolytic  Instructed to use albuterol inhaler 3 times daily until acute bronchospasm resolves   Smoking cessation advised   Refilled Ativan. Advised for side effects     {Follow-Up:in one week if not better, consider CXR     Robert Singh MD  [facility]    "

## 2017-12-22 ENCOUNTER — TELEPHONE (OUTPATIENT)
Dept: INTERNAL MEDICINE | Facility: CLINIC | Age: 57
End: 2017-12-22

## 2017-12-22 DIAGNOSIS — L08.9 FOOT INFECTION: ICD-10-CM

## 2017-12-22 DIAGNOSIS — B37.0 THRUSH: Primary | ICD-10-CM

## 2017-12-22 RX ORDER — NYSTATIN 100000/ML
500000 SUSPENSION, ORAL (FINAL DOSE FORM) ORAL 4 TIMES DAILY
Qty: 280 ML | Refills: 0 | Status: SHIPPED | OUTPATIENT
Start: 2017-12-22 | End: 2018-10-30

## 2017-12-22 NOTE — TELEPHONE ENCOUNTER
"Call from pt stating she was seen by PCP 12/18 and was started on an abx for URI. States she now has a \"yeast infection in her mouth\". States this always happens when she takes abx. Please advise.    "

## 2017-12-26 RX ORDER — FLUCONAZOLE 150 MG/1
TABLET ORAL
Qty: 2 TABLET | Refills: 0 | Status: SHIPPED | OUTPATIENT
Start: 2017-12-26 | End: 2018-01-31

## 2017-12-26 NOTE — TELEPHONE ENCOUNTER
Called pt's work number (the number pt called on), vm left for pt to call clinic back.    Called pt's home number, unable to leave vm as vm not set up yet.

## 2018-01-09 DIAGNOSIS — J98.01 ACUTE BRONCHOSPASM: ICD-10-CM

## 2018-01-12 RX ORDER — MOMETASONE FUROATE AND FORMOTEROL FUMARATE DIHYDRATE 100; 5 UG/1; UG/1
AEROSOL RESPIRATORY (INHALATION)
Qty: 13 INHALER | Refills: 1 | OUTPATIENT
Start: 2018-01-12

## 2018-01-12 NOTE — TELEPHONE ENCOUNTER
"Requested Prescriptions   Pending Prescriptions Disp Refills     DULERA 100-5 MCG/ACT oral inhaler [Pharmacy Med Name: DULERA 100 MCG/5 MCG INHALER] 13 Inhaler 1     Sig: INHALE 2 PUFFS BY MOUTH TWICE A DAY    Inhaled Steroids Protocol Passed    1/9/2018  1:14 AM       Passed - Patient is age 12 or older       Passed - Recent or future visit with authorizing provider's specialty    Patient had office visit in the last year or has a visit in the next 30 days with authorizing provider.  See \"Patient Info\" tab in inbasket, or \"Choose Columns\" in Meds & Orders section of the refill encounter.                 Rx sent 11-10-17 #13 inhalers with 1 refill.    Med refuse with reason: duplicate.  "

## 2018-01-15 ENCOUNTER — OFFICE VISIT (OUTPATIENT)
Dept: INTERNAL MEDICINE | Facility: CLINIC | Age: 58
End: 2018-01-15
Payer: COMMERCIAL

## 2018-01-15 ENCOUNTER — RADIANT APPOINTMENT (OUTPATIENT)
Dept: GENERAL RADIOLOGY | Facility: CLINIC | Age: 58
End: 2018-01-15
Attending: INTERNAL MEDICINE
Payer: COMMERCIAL

## 2018-01-15 VITALS
HEIGHT: 65 IN | TEMPERATURE: 100.3 F | SYSTOLIC BLOOD PRESSURE: 150 MMHG | OXYGEN SATURATION: 92 % | WEIGHT: 109 LBS | HEART RATE: 150 BPM | BODY MASS INDEX: 18.16 KG/M2 | DIASTOLIC BLOOD PRESSURE: 76 MMHG

## 2018-01-15 DIAGNOSIS — B34.9 VIRAL ILLNESS: ICD-10-CM

## 2018-01-15 DIAGNOSIS — J18.9 PNEUMONIA OF LEFT LOWER LOBE DUE TO INFECTIOUS ORGANISM: ICD-10-CM

## 2018-01-15 DIAGNOSIS — B34.9 VIRAL ILLNESS: Primary | ICD-10-CM

## 2018-01-15 DIAGNOSIS — J42 CHRONIC BRONCHITIS, UNSPECIFIED CHRONIC BRONCHITIS TYPE (H): ICD-10-CM

## 2018-01-15 DIAGNOSIS — J98.01 ACUTE BRONCHOSPASM: ICD-10-CM

## 2018-01-15 LAB
ERYTHROCYTE [DISTWIDTH] IN BLOOD BY AUTOMATED COUNT: 12.5 % (ref 10–15)
FLUAV+FLUBV AG SPEC QL: NEGATIVE
FLUAV+FLUBV AG SPEC QL: NEGATIVE
HCT VFR BLD AUTO: 41.2 % (ref 35–47)
HGB BLD-MCNC: 14.1 G/DL (ref 11.7–15.7)
MCH RBC QN AUTO: 30.6 PG (ref 26.5–33)
MCHC RBC AUTO-ENTMCNC: 34.2 G/DL (ref 31.5–36.5)
MCV RBC AUTO: 89 FL (ref 78–100)
PLATELET # BLD AUTO: 244 10E9/L (ref 150–450)
RBC # BLD AUTO: 4.61 10E12/L (ref 3.8–5.2)
SPECIMEN SOURCE: NORMAL
WBC # BLD AUTO: 9.6 10E9/L (ref 4–11)

## 2018-01-15 PROCEDURE — 87804 INFLUENZA ASSAY W/OPTIC: CPT | Performed by: INTERNAL MEDICINE

## 2018-01-15 PROCEDURE — 36415 COLL VENOUS BLD VENIPUNCTURE: CPT | Performed by: INTERNAL MEDICINE

## 2018-01-15 PROCEDURE — 80053 COMPREHEN METABOLIC PANEL: CPT | Performed by: INTERNAL MEDICINE

## 2018-01-15 PROCEDURE — 85027 COMPLETE CBC AUTOMATED: CPT | Performed by: INTERNAL MEDICINE

## 2018-01-15 PROCEDURE — 99214 OFFICE O/P EST MOD 30 MIN: CPT | Performed by: INTERNAL MEDICINE

## 2018-01-15 PROCEDURE — 71046 X-RAY EXAM CHEST 2 VIEWS: CPT

## 2018-01-15 RX ORDER — OSELTAMIVIR PHOSPHATE 75 MG/1
75 CAPSULE ORAL 2 TIMES DAILY
Qty: 10 CAPSULE | Refills: 0 | Status: SHIPPED | OUTPATIENT
Start: 2018-01-15 | End: 2018-10-30

## 2018-01-15 RX ORDER — ALBUTEROL SULFATE 90 UG/1
AEROSOL, METERED RESPIRATORY (INHALATION)
Qty: 1 INHALER | Refills: 5 | Status: SHIPPED | OUTPATIENT
Start: 2018-01-15 | End: 2018-12-21

## 2018-01-15 NOTE — PROGRESS NOTES
SUBJECTIVE:                                                    Bailee Dixon is a 57 year old female who presents to clinic today for the following health issues:  URI , cough onset Saterday PM , chills SOB , some body  aches , lat dose of antipyrotic (acedominophan ) 11:00 am -last dose of luteal inhaler about two hours ago .     Patient is seen for a follow up visit.  Had recent URI 3 weeks ago. Treated with antibiotic, steroid, improved. Now with recurrent 2-3 days symptoms of fever, cough, wheezing, HA.   Has h/o smoking, COPD. On inhaled steroid and bronchodilator treatment. Still smoking.   Feels tired, has myalgias, arthralgias.         Problem list and histories reviewed & adjusted, as indicated.  Additional history: as documented    Patient Active Problem List   Diagnosis     Migraine without aura     CARDIOVASCULAR SCREENING; LDL GOAL LESS THAN 160     Smoking     Chronic low back pain     Acute respiratory failure (H)     NSTEMI (non-ST elevated myocardial infarction) (H)     Anxiety     Chronic bronchitis, unspecified chronic bronchitis type (H)     Past Surgical History:   Procedure Laterality Date     C NONSPECIFIC PROCEDURE  1989    ALEX/BSO for endometriosis     C NONSPECIFIC PROCEDURE      T + A     C NONSPECIFIC PROCEDURE  1989    appy with ALEX       HYSTERECTOMY, PAP NO LONGER INDICATED         Social History   Substance Use Topics     Smoking status: Former Smoker     Packs/day: 1.00     Years: 21.00     Types: Cigarettes     Quit date: 10/17/2011     Smokeless tobacco: Never Used      Comment: started 1990 per pt, occasionally now     Alcohol use 0.0 oz/week     0 Standard drinks or equivalent per week      Comment: 2-3 drinks weekly     Family History   Problem Relation Age of Onset     Arthritis Mother      SLE     DIABETES Maternal Grandmother          Current Outpatient Prescriptions   Medication Sig Dispense Refill     nystatin (MYCOSTATIN) 037161 UNIT/ML suspension Take 5 mLs  "(500,000 Units) by mouth 4 times daily 280 mL 0     LORazepam (ATIVAN) 0.5 MG tablet Take 1 tablet (0.5 mg) by mouth every 8 hours as needed for anxiety 15 tablet 0     guaiFENesin-codeine (ROBITUSSIN AC) 100-10 MG/5ML SOLN solution Take 5 mLs by mouth every 4 hours as needed for cough 120 mL 0     DULERA 100-5 MCG/ACT oral inhaler INHALE 2 PUFFS BY MOUTH 2 TIMES DAILY 13 Inhaler 1     albuterol (VENTOLIN HFA) 108 (90 BASE) MCG/ACT Inhaler INHALE 2 PUFFS INTO THE LUNGS EVERY 6 HOURS AS NEEDED FOR SHORTNESS OF BREATH / DYSPNEA OR WHEEZING 1 Inhaler 3     aspirin EC 81 MG EC tablet Take 1 tablet (81 mg) by mouth daily 30 tablet 1     clopidogrel (PLAVIX) 75 MG tablet Take 1 tablet (75 mg) by mouth daily 30 tablet 1     EPINEPHrine (EPIPEN) 0.3 MG/0.3ML injection Inject 0.3 mLs (0.3 mg) into the muscle once as needed for anaphylaxis 1 each 1     fluconazole (DIFLUCAN) 150 MG tablet take 1 for yeast; may repeat once if needed 2 tablet 0     predniSONE (DELTASONE) 20 MG tablet Take 3 tabs (60 mg) by mouth daily x 3 days, 2 tabs (40 mg) daily x 3 days, 1 tab (20 mg) daily x 3 days, then 1/2 tab (10 mg) x 3 days. 20 tablet 0     PREMARIN 0.625 MG OR TABS 1 daily 90 3         ROS:  Constitutional, HEENT, cardiovascular, pulmonary, gi and gu systems are negative, except as otherwise noted.      OBJECTIVE:   /76  Pulse 150  Temp 100.3  F (37.9  C) (Oral)  Ht 5' 5\" (1.651 m)  Wt 109 lb (49.4 kg)  SpO2 92%  BMI 18.14 kg/m2  Body mass index is 18.14 kg/(m^2).   GENERAL: frail, alert and no distress  HEENT: REGINALDO, erythematous and edematous nasal mucosa , no pharyngeal erythema   NECK: no adenopathy, no asymmetry, masses, or scars and thyroid normal to palpation  RESP: lungs  - no rales,+ scattered  rhonchi and coarse expiratory  wheezes  CV: regular rate and rhythm, normal S1 S2, no S3 or S4, no murmur, click or rub, no peripheral edema and peripheral pulses strong  ABDOMEN: soft, nontender, no hepatosplenomegaly, no " masses and bowel sounds normal  MS: no gross musculoskeletal defects noted, no edema    Diagnostic Test Results:  none     ASSESSMENT/PLAN:     Problem List Items Addressed This Visit     Chronic bronchitis, unspecified chronic bronchitis type (H)      Other Visit Diagnoses     Viral illness    -  Primary    Relevant Orders    Influenza A/B antigen    XR Chest 2 Views    CBC with platelets    Comprehensive metabolic panel           Assess influenza antigen, clinically likely,   Assess CXR- history on COPD, possible pneumonia   Cont inhalers   Smoking cessation     Follow-Up:in 2-3 days if not improving     Robert Singh MD  [facility]

## 2018-01-15 NOTE — MR AVS SNAPSHOT
"              After Visit Summary   1/15/2018    Bailee Dixon    MRN: [id number]           Patient Information     Date Of Birth          1960        Visit Information        Provider Department      1/15/2018 3:00 PM Robert Singh MD [facility]        Today's Diagnoses     Viral illness    -  1    Chronic bronchitis, unspecified chronic bronchitis type (H)        Acute bronchospasm        Pneumonia of left lower lobe due to infectious organism (H)           Follow-ups after your visit        Who to contact     If you have questions or need follow up information about today's clinic visit or your schedule please contact [facility] directly at [phone].  Normal or non-critical lab and imaging results will be communicated to you by MyChart, letter or phone within 4 business days after the clinic has received the results. If you do not hear from us within 7 days, please contact the clinic through [employer] or phone. If you have a critical or abnormal lab result, we will notify you by phone as soon as possible.  Submit refill requests through [employer] or call your pharmacy and they will forward the refill request to us. Please allow 3 business days for your refill to be completed.          Additional Information About Your Visit        MyChart Information     [employer] gives you secure access to your electronic health record. If you see a primary care provider, you can also send messages to your care team and make appointments. If you have questions, please call your primary care clinic.  If you do not have a primary care provider, please call [phone] and they will assist you.        Care EveryWhere ID     This is your Care EveryWhere ID. This could be used by other organizations to access your [facility] medical records  [id number]        Your Vitals Were     Pulse Temperature Height Pulse Oximetry BMI (Body Mass Index)       150 100.3  F (37.9  C) (Oral) 5' 5\" " (1.651 m) 92% 18.14 kg/m2        Blood Pressure from Last 3 Encounters:   01/15/18 150/76   12/19/17 122/70   05/05/17 128/68    Weight from Last 3 Encounters:   01/15/18 109 lb (49.4 kg)   12/19/17 108 lb (49 kg)   05/05/17 108 lb (49 kg)              We Performed the Following     CBC with platelets     Comprehensive metabolic panel     Influenza A/B antigen          Today's Medication Changes          These changes are accurate as of: 1/15/18  4:14 PM.  If you have any questions, ask your nurse or doctor.               Start taking these medicines.        Dose/Directions    oseltamivir 75 MG capsule   Commonly known as:  TAMIFLU   Used for:  Viral illness   Started by:  Robert Singh MD        Dose:  75 mg   Take 1 capsule (75 mg) by mouth 2 times daily   Quantity:  10 capsule   Refills:  0         These medicines have changed or have updated prescriptions.        Dose/Directions    mometasone-formoterol 100-5 MCG/ACT oral inhaler   Commonly known as:  DULERA   This may have changed:  See the new instructions.   Used for:  Acute bronchospasm   Changed by:  Robert Singh MD        INHALE 2 PUFFS BY MOUTH 2 TIMES DAILY   Quantity:  3 Inhaler   Refills:  3         Stop taking these medicines if you haven't already. Please contact your care team if you have questions.     amoxicillin-clavulanate 875-125 MG per tablet   Commonly known as:  AUGMENTIN   Stopped by:  Robert Singh MD                Where to get your medicines      These medications were sent to [facility]/pharmacy #[id number] - [facility] [address]  [address] [facility] [address]     Phone:  [phone]     albuterol 108 (90 BASE) MCG/ACT Inhaler    oseltamivir 75 MG capsule         Call your pharmacy to confirm that your medication is ready for pickup. It may take up to 24 hours for them to receive the prescription. If the prescription is not ready within 3 business days, please contact your clinic or your provider.      We will let you know when these medications are ready. If you don't hear back within 3 business days, please contact us.     mometasone-formoterol 100-5 MCG/ACT oral inhaler                Primary Care Provider Office Phone # Fax #    Robert Singh [phone]       [address] [facility] [address]        Equal Access to Services     [facility]: Hadii aad ku hadasho Soomaali, waaxda luqadaha, qaybta kaalmada adeegyada, waxay idiin hayaan adeeg kharash la'aan . So [facility] [phone].    ATENCIÓN: Si habla español, tiene a kamara disposición servicios gratuitos de asistencia lingüística. Zoë al [phone].    We comply with applicable federal civil rights laws and [place] laws. We do not discriminate on the basis of race, color, national origin, age, disability, sex, sexual orientation, or gender identity.            Thank you!     Thank you for choosing [facility]  for your care. Our goal is always to provide you with excellent care. Hearing back from our patients is one way we can continue to improve our services. Please take a few minutes to complete the written survey that you may receive in the mail after your visit with us. Thank you!             Your Updated Medication List - Protect others around you: Learn how to safely use, store and throw away your medicines at www.disposemymeds.org.          This list is accurate as of: 1/15/18  4:14 PM.  Always use your most recent med list.                   Brand Name Dispense Instructions for use Diagnosis    albuterol 108 (90 BASE) MCG/ACT Inhaler    VENTOLIN HFA    1 Inhaler    INHALE 2 PUFFS INTO THE LUNGS EVERY 6 HOURS AS NEEDED FOR SHORTNESS OF BREATH / DYSPNEA OR WHEEZING    Pneumonia of left lower lobe due to infectious organism (H)       aspirin 81 MG EC tablet     30 tablet    Take 1 tablet (81 mg) by mouth daily    Cardiac ischemia       clopidogrel 75 MG tablet    PLAVIX    30 tablet    Take 1 tablet (75  mg) by mouth daily    Cardiac ischemia       EPINEPHrine 0.3 MG/0.3ML injection 2-pack    EPIPEN/ADRENACLICK/or ANY BX GENERIC EQUIV    1 each    Inject 0.3 mLs (0.3 mg) into the muscle once as needed for anaphylaxis    Bee sting allergy       fluconazole 150 MG tablet    DIFLUCAN    2 tablet    take 1 for yeast; may repeat once if needed    Foot infection       guaiFENesin-codeine 100-10 MG/5ML Soln solution    ROBITUSSIN AC    120 mL    Take 5 mLs by mouth every 4 hours as needed for cough    Upper respiratory tract infection, unspecified type       LORazepam 0.5 MG tablet    ATIVAN    15 tablet    Take 1 tablet (0.5 mg) by mouth every 8 hours as needed for anxiety    Respiratory distress       mometasone-formoterol 100-5 MCG/ACT oral inhaler    DULERA    3 Inhaler    INHALE 2 PUFFS BY MOUTH 2 TIMES DAILY    Acute bronchospasm       nystatin 676192 UNIT/ML suspension    MYCOSTATIN    280 mL    Take 5 mLs (500,000 Units) by mouth 4 times daily    Thrush       oseltamivir 75 MG capsule    TAMIFLU    10 capsule    Take 1 capsule (75 mg) by mouth 2 times daily    Viral illness       predniSONE 20 MG tablet    DELTASONE    20 tablet    Take 3 tabs (60 mg) by mouth daily x 3 days, 2 tabs (40 mg) daily x 3 days, 1 tab (20 mg) daily x 3 days, then 1/2 tab (10 mg) x 3 days.    Upper respiratory tract infection, unspecified type       PREMARIN 0.625 MG tablet   Generic drug:  estrogens (conjugated)     90    1 daily    Post-menopause on HRT (hormone replacement therapy)

## 2018-01-15 NOTE — NURSING NOTE
"Chief Complaint   Patient presents with     URI       Initial /76  Pulse 150  Temp 100.3  F (37.9  C) (Oral)  Ht 5' 5\" (1.651 m)  Wt 109 lb (49.4 kg)  SpO2 92%  BMI 18.14 kg/m2 Estimated body mass index is 18.14 kg/(m^2) as calculated from the following:    Height as of this encounter: 5' 5\" (1.651 m).    Weight as of this encounter: 109 lb (49.4 kg).  Medication Reconciliation: complete    "

## 2018-01-16 LAB
ALBUMIN SERPL-MCNC: 4 G/DL (ref 3.4–5)
ALP SERPL-CCNC: 119 U/L (ref 40–150)
ALT SERPL W P-5'-P-CCNC: 19 U/L (ref 0–50)
ANION GAP SERPL CALCULATED.3IONS-SCNC: 6 MMOL/L (ref 3–14)
AST SERPL W P-5'-P-CCNC: 18 U/L (ref 0–45)
BILIRUB SERPL-MCNC: 0.4 MG/DL (ref 0.2–1.3)
BUN SERPL-MCNC: 7 MG/DL (ref 7–30)
CALCIUM SERPL-MCNC: 8.5 MG/DL (ref 8.5–10.1)
CHLORIDE SERPL-SCNC: 96 MMOL/L (ref 94–109)
CO2 SERPL-SCNC: 28 MMOL/L (ref 20–32)
CREAT SERPL-MCNC: 0.66 MG/DL (ref 0.52–1.04)
GFR SERPL CREATININE-BSD FRML MDRD: >90 ML/MIN/1.7M2
GLUCOSE SERPL-MCNC: 142 MG/DL (ref 70–99)
POTASSIUM SERPL-SCNC: 3.8 MMOL/L (ref 3.4–5.3)
PROT SERPL-MCNC: 7.6 G/DL (ref 6.8–8.8)
SODIUM SERPL-SCNC: 130 MMOL/L (ref 133–144)

## 2018-01-16 RX ORDER — AZITHROMYCIN 250 MG/1
TABLET, FILM COATED ORAL
Qty: 6 TABLET | Refills: 0 | Status: SHIPPED | OUTPATIENT
Start: 2018-01-16 | End: 2018-10-30

## 2018-01-31 DIAGNOSIS — L08.9 FOOT INFECTION: ICD-10-CM

## 2018-01-31 RX ORDER — FLUCONAZOLE 150 MG/1
TABLET ORAL
Qty: 2 TABLET | Refills: 0 | Status: SHIPPED | OUTPATIENT
Start: 2018-01-31 | End: 2018-10-30

## 2018-01-31 NOTE — TELEPHONE ENCOUNTER
Patient states still has thrush, white inside pt mouth and on tongue.  Asking for refill of Diflucan.   Diflucan      Last Written Prescription Date:  12/26/17  Last Fill Quantity: 2,   # refills: 0  Last Office Visit: 1/15/18  Future Office visit:       Routing refill request to provider for review/approval because:  Drug not on the [facility] or [facility] refill protocol or controlled substance

## 2018-05-11 ENCOUNTER — TELEPHONE (OUTPATIENT)
Dept: INTERNAL MEDICINE | Facility: CLINIC | Age: 58
End: 2018-05-11

## 2018-05-11 DIAGNOSIS — B37.0 THRUSH: Primary | ICD-10-CM

## 2018-05-11 RX ORDER — NYSTATIN 100000/ML
500000 SUSPENSION, ORAL (FINAL DOSE FORM) ORAL 4 TIMES DAILY
Qty: 60 ML | Refills: 0 | Status: SHIPPED | OUTPATIENT
Start: 2018-05-11 | End: 2018-10-30

## 2018-05-11 NOTE — TELEPHONE ENCOUNTER
Call to pt and advised. She states it makes her gag but will try it over the weekend.   She will call back if needed for the Diflucan

## 2018-05-11 NOTE — TELEPHONE ENCOUNTER
Patient states she has another oral yeast infection.  Roof of mouth is coated with white, non painful.  There is a coating that she can actually scrape off.  Denies pain.   MOUNIKA Tirado R.N.

## 2018-06-22 ENCOUNTER — MYC MEDICAL ADVICE (OUTPATIENT)
Dept: INTERNAL MEDICINE | Facility: CLINIC | Age: 58
End: 2018-06-22

## 2018-06-22 DIAGNOSIS — B37.0 THRUSH: Primary | ICD-10-CM

## 2018-06-22 RX ORDER — FLUCONAZOLE 150 MG/1
150 TABLET ORAL
Qty: 4 TABLET | Refills: 0 | Status: SHIPPED | OUTPATIENT
Start: 2018-06-22 | End: 2018-08-28

## 2018-08-13 ENCOUNTER — TELEPHONE (OUTPATIENT)
Dept: INTERNAL MEDICINE | Facility: CLINIC | Age: 58
End: 2018-08-13

## 2018-08-13 NOTE — TELEPHONE ENCOUNTER
Patient calls stating her daughter tested positive for MRSA this morning and patient wanting to know if she should be tested, as she's around her daughter all the time. Patient stating she has a yeast infection in her mouth that started about a week ago. Denies any other signs and symptoms    Provider please review and advise. Thanks.

## 2018-08-27 ENCOUNTER — MYC MEDICAL ADVICE (OUTPATIENT)
Dept: INTERNAL MEDICINE | Facility: CLINIC | Age: 58
End: 2018-08-27

## 2018-08-27 DIAGNOSIS — B37.0 THRUSH: ICD-10-CM

## 2018-08-28 ENCOUNTER — MYC REFILL (OUTPATIENT)
Dept: INTERNAL MEDICINE | Facility: CLINIC | Age: 58
End: 2018-08-28

## 2018-08-28 DIAGNOSIS — B37.0 THRUSH: ICD-10-CM

## 2018-08-28 RX ORDER — FLUCONAZOLE 150 MG/1
150 TABLET ORAL
Qty: 4 TABLET | Refills: 0 | Status: SHIPPED | OUTPATIENT
Start: 2018-08-28 | End: 2018-10-30

## 2018-08-28 RX ORDER — FLUCONAZOLE 150 MG/1
150 TABLET ORAL
Qty: 4 TABLET | Refills: 0 | Status: CANCELLED | OUTPATIENT
Start: 2018-08-28

## 2018-08-28 NOTE — TELEPHONE ENCOUNTER
Please see patient's mychart message below.  Asking for a refill of Diflucan for a poss yeast infection in her mouth.  Patient took 2 of the 4 tabs but her  accidentally threw the last 2 tabs away.    Diflucan      Last Written Prescription Date:  6-22-18  Last Fill Quantity: 4,   # refills: 0  Last Office Visit: 1-15-18  Future Office visit:       Routing refill request to provider for review/approval because:  Drug not on the [facility], P or [facility] refill protocol or controlled substance    Please advise, thanks.

## 2018-09-05 NOTE — TELEPHONE ENCOUNTER
Message from [employer]:  Original authorizing provider: MD Bailee Charles would like a refill of the following medications:  fluconazole (DIFLUCAN) 150 MG tablet [Robert Singh MD]    Preferred pharmacy: [facility]/PHARMACY #[id number] [facility] [address] [address]    Comment:  Please refill. Thanks, Bailee

## 2018-10-30 ENCOUNTER — OFFICE VISIT (OUTPATIENT)
Dept: INTERNAL MEDICINE | Facility: CLINIC | Age: 58
End: 2018-10-30
Payer: COMMERCIAL

## 2018-10-30 VITALS
DIASTOLIC BLOOD PRESSURE: 70 MMHG | HEART RATE: 90 BPM | HEIGHT: 65 IN | OXYGEN SATURATION: 97 % | SYSTOLIC BLOOD PRESSURE: 118 MMHG | TEMPERATURE: 98 F | WEIGHT: 108.8 LBS | RESPIRATION RATE: 16 BRPM | BODY MASS INDEX: 18.13 KG/M2

## 2018-10-30 DIAGNOSIS — J42 CHRONIC BRONCHITIS, UNSPECIFIED CHRONIC BRONCHITIS TYPE (H): ICD-10-CM

## 2018-10-30 DIAGNOSIS — B37.0 THRUSH: ICD-10-CM

## 2018-10-30 DIAGNOSIS — Z71.6 TOBACCO ABUSE COUNSELING: Primary | ICD-10-CM

## 2018-10-30 DIAGNOSIS — Z12.31 ENCOUNTER FOR SCREENING MAMMOGRAM FOR BREAST CANCER: ICD-10-CM

## 2018-10-30 PROCEDURE — 87102 FUNGUS ISOLATION CULTURE: CPT | Performed by: INTERNAL MEDICINE

## 2018-10-30 PROCEDURE — 99213 OFFICE O/P EST LOW 20 MIN: CPT | Performed by: INTERNAL MEDICINE

## 2018-10-30 RX ORDER — FLUCONAZOLE 100 MG/1
TABLET ORAL
Qty: 10 TABLET | Refills: 0 | Status: SHIPPED | OUTPATIENT
Start: 2018-10-30 | End: 2019-03-15

## 2018-10-30 ASSESSMENT — ANXIETY QUESTIONNAIRES
7. FEELING AFRAID AS IF SOMETHING AWFUL MIGHT HAPPEN: SEVERAL DAYS
4. TROUBLE RELAXING: SEVERAL DAYS
2. NOT BEING ABLE TO STOP OR CONTROL WORRYING: SEVERAL DAYS
1. FEELING NERVOUS, ANXIOUS, OR ON EDGE: SEVERAL DAYS
GAD7 TOTAL SCORE: 7
3. WORRYING TOO MUCH ABOUT DIFFERENT THINGS: SEVERAL DAYS
GAD7 TOTAL SCORE: 7
6. BECOMING EASILY ANNOYED OR IRRITABLE: SEVERAL DAYS
5. BEING SO RESTLESS THAT IT IS HARD TO SIT STILL: SEVERAL DAYS
7. FEELING AFRAID AS IF SOMETHING AWFUL MIGHT HAPPEN: SEVERAL DAYS
GAD7 TOTAL SCORE: 7

## 2018-10-30 ASSESSMENT — PATIENT HEALTH QUESTIONNAIRE - PHQ9
SUM OF ALL RESPONSES TO PHQ QUESTIONS 1-9: 3
SUM OF ALL RESPONSES TO PHQ QUESTIONS 1-9: 3

## 2018-10-30 NOTE — PROGRESS NOTES
SUBJECTIVE:   Bailee Dixon is a 58 year old female who presents to clinic today for the following health issues:  Answers for HPI/ROS submitted by the patient on 10/30/2018   JANAE 7 TOTAL SCORE: 7  PHQ9 TOTAL SCORE: 3      UTI  Thrush     Patient with h/o COPD, on inhaled steroid/ bronchodilator treatment, has had increased cough, SOB, wheezing for a week. No fevers, CP. Cough is with clear phlegm production, improving . Has developed whitish patches on the tongue and mouth/ palate mucosa. Able to scrape them off. Mildly painful.   Still smokes, but decreased amount.       PROBLEMS TO ADD ON...  Discussed preventive measures.     Problem list and histories reviewed & adjusted, as indicated.  Additional history: as documented    Patient Active Problem List   Diagnosis     Migraine without aura     CARDIOVASCULAR SCREENING; LDL GOAL LESS THAN 160     Smoking     Chronic low back pain     Acute respiratory failure (H)     NSTEMI (non-ST elevated myocardial infarction) (H)     Anxiety     Chronic bronchitis, unspecified chronic bronchitis type (H)     Past Surgical History:   Procedure Laterality Date     C NONSPECIFIC PROCEDURE  1989    ALEX/BSO for endometriosis     C NONSPECIFIC PROCEDURE      T + A     C NONSPECIFIC PROCEDURE  1989    appy with ALEX       HYSTERECTOMY, PAP NO LONGER INDICATED         Social History   Substance Use Topics     Smoking status: Current Some Day Smoker     Packs/day: 0.50     Years: 21.00     Types: Cigarettes     Last attempt to quit: 10/17/2011     Smokeless tobacco: Never Used      Comment: started 1990 per pt, occasionally now     Alcohol use 0.0 oz/week     0 Standard drinks or equivalent per week      Comment: 2-3 drinks weekly     Family History   Problem Relation Age of Onset     Arthritis Mother      SLE     Diabetes Maternal Grandmother          Current Outpatient Prescriptions   Medication Sig Dispense Refill     albuterol (VENTOLIN HFA) 108 (90 BASE) MCG/ACT Inhaler  "INHALE 2 PUFFS INTO THE LUNGS EVERY 6 HOURS AS NEEDED FOR SHORTNESS OF BREATH / DYSPNEA OR WHEEZING 1 Inhaler 5     EPINEPHrine (EPIPEN) 0.3 MG/0.3ML injection Inject 0.3 mLs (0.3 mg) into the muscle once as needed for anaphylaxis 1 each 1     fluconazole (DIFLUCAN) 100 MG tablet Take one daily 10 tablet 0     mometasone-formoterol (DULERA) 100-5 MCG/ACT oral inhaler INHALE 2 PUFFS BY MOUTH 2 TIMES DAILY 3 Inhaler 3     LORazepam (ATIVAN) 0.5 MG tablet Take 1 tablet (0.5 mg) by mouth every 8 hours as needed for anxiety (Patient not taking: Reported on 10/30/2018) 15 tablet 0       Reviewed and updated as needed this visit by clinical staff  Tobacco  Allergies  Meds  Med Hx  Surg Hx  Fam Hx  Soc Hx      Reviewed and updated as needed this visit by Provider         ROS:  Constitutional, HEENT, cardiovascular, pulmonary, gi and gu systems are negative, except as otherwise noted.    OBJECTIVE:     /70 (BP Location: Left arm, Patient Position: Chair, Cuff Size: Adult Regular)  Pulse 90  Temp 98  F (36.7  C) (Oral)  Resp 16  Ht 5' 5\" (1.651 m)  Wt 108 lb 12.8 oz (49.4 kg)  SpO2 97%  BMI 18.11 kg/m2  Body mass index is 18.11 kg/(m^2).   GENERAL: healthy, alert and no distress  EYES: Eyes grossly normal to inspection, PERRL and conjunctivae and sclerae normal  HENT: ear canals and TM's normal, nose and mouth - white patches on the mouth buccal , palate mucosa and tongue   NECK: no adenopathy, no asymmetry, masses, or scars and thyroid normal to palpation  RESP: lungs clear to auscultation - no rales, rhonchi or wheezes  CV: regular rate and rhythm, normal S1 S2, no S3 or S4, no murmur, click or rub, no peripheral edema and peripheral pulses strong  ABDOMEN: soft, nontender, no hepatosplenomegaly, no masses and bowel sounds normal  MS: no gross musculoskeletal defects noted, no edema    Diagnostic Test Results:  Results for orders placed or performed in visit on 10/30/18 (from the past 24 hour(s))   Yeast " culture   Result Value Ref Range    Specimen Description Throat     Special Requests Specimen collected in eSwab transport (white cap)     Culture Micro PENDING        ASSESSMENT/PLAN:     Problem List Items Addressed This Visit     Chronic bronchitis, unspecified chronic bronchitis type (H)      Other Visit Diagnoses     Tobacco abuse counseling    -  Primary    Relevant Orders    CT Chest Lung Cancer Scrn Low Dose wo    Thrush        Relevant Medications    fluconazole (DIFLUCAN) 100 MG tablet    Other Relevant Orders    Yeast culture (Completed)    Encounter for screening mammogram for breast cancer        Relevant Orders    *MA Screening Digital Bilateral           Assess mouth culture for yeast   Start Diflucan   Oral rinsing after use of Dulera discussed   Smoking cessation advised   Improved URI, no symptoms of infection to need antibiotic   Declined colonoscopy     Follow-Up:as needed       Robert Singh MD  [facility]

## 2018-10-30 NOTE — MR AVS SNAPSHOT
After Visit Summary   10/30/2018    Bailee Dixon    MRN: [id number]           Patient Information     Date Of Birth          1960        Visit Information        Provider Department      10/30/2018 3:20 PM Robert Singh MD [facility]        Today's Diagnoses     Tobacco abuse counseling    -  1    Thrush        Encounter for screening mammogram for breast cancer           Follow-ups after your visit        Future tests that were ordered for you today     Open Future Orders        Priority Expected Expires Ordered    *MA Screening Digital Bilateral Routine  10/30/2019 10/30/2018    CT Chest Lung Cancer Scrn Low Dose wo Routine  10/30/2019 10/30/2018            Who to contact     If you have questions or need follow up information about today's clinic visit or your schedule please contact [facility] directly at [phone].  Normal or non-critical lab and imaging results will be communicated to you by Flowgearhart, letter or phone within 4 business days after the clinic has received the results. If you do not hear from us within 7 days, please contact the clinic through Flowgearhart or phone. If you have a critical or abnormal lab result, we will notify you by phone as soon as possible.  Submit refill requests through Chlorogen or call your pharmacy and they will forward the refill request to us. Please allow 3 business days for your refill to be completed.          Additional Information About Your Visit        MyChart Information     [employer] gives you secure access to your electronic health record. If you see a primary care provider, you can also send messages to your care team and make appointments. If you have questions, please call your primary care clinic.  If you do not have a primary care provider, please call [phone] and they will assist you.        Care EveryWhere ID     This is your Care EveryWhere ID. This could be used by other organizations to  "access your [facility] medical records  [id number]        Your Vitals Were     Pulse Temperature Respirations Height Pulse Oximetry BMI (Body Mass Index)    90 98  F (36.7  C) (Oral) 16 5' 5\" (1.651 m) 97% 18.11 kg/m2       Blood Pressure from Last 3 Encounters:   10/30/18 118/70   01/15/18 150/76   12/19/17 122/70    Weight from Last 3 Encounters:   10/30/18 108 lb 12.8 oz (49.4 kg)   01/15/18 109 lb (49.4 kg)   12/19/17 108 lb (49 kg)              We Performed the Following     Yeast culture          Today's Medication Changes          These changes are accurate as of 10/30/18  3:53 PM.  If you have any questions, ask your nurse or doctor.               Start taking these medicines.        Dose/Directions    fluconazole 100 MG tablet   Commonly known as:  DIFLUCAN   Used for:  Thrush   Started by:  Robert Singh MD        Take one daily   Quantity:  10 tablet   Refills:  0            Where to get your medicines      These medications were sent to [facility]/pharmacy #[id number] - [facility] [address] [facility]  [address] [address] [address]     Phone:  [phone]     fluconazole 100 MG tablet                Primary Care Provider Office Phone # Fax #    Robert Singh [phone]       [address] [facility] [address]        Equal Access to Services     [facility]: Hadii ajit ku hadasho Sobyronali, waaxda luqadaha, qaybta kaalmada adeegyada, giovanni blair . So [facility] [phone].    ATENCIÓN: Si habla español, tiene a kamara disposición servicios gratuitos de asistencia lingüística. Llame al [phone].    We comply with applicable federal civil rights laws and [place] laws. We do not discriminate on the basis of race, color, national origin, age, disability, sex, sexual orientation, or gender identity.            Thank you!     Thank you for choosing [facility]  for your care. Our goal is always to provide you with excellent care. " Hearing back from our patients is one way we can continue to improve our services. Please take a few minutes to complete the written survey that you may receive in the mail after your visit with us. Thank you!             Your Updated Medication List - Protect others around you: Learn how to safely use, store and throw away your medicines at www.disposemymeds.org.          This list is accurate as of 10/30/18  3:53 PM.  Always use your most recent med list.                   Brand Name Dispense Instructions for use Diagnosis    albuterol 108 (90 Base) MCG/ACT inhaler    VENTOLIN HFA    1 Inhaler    INHALE 2 PUFFS INTO THE LUNGS EVERY 6 HOURS AS NEEDED FOR SHORTNESS OF BREATH / DYSPNEA OR WHEEZING    Pneumonia of left lower lobe due to infectious organism (H)       EPINEPHrine 0.3 MG/0.3ML injection 2-pack    EPIPEN/ADRENACLICK/or ANY BX GENERIC EQUIV    1 each    Inject 0.3 mLs (0.3 mg) into the muscle once as needed for anaphylaxis    Bee sting allergy       fluconazole 100 MG tablet    DIFLUCAN    10 tablet    Take one daily    Thrush       LORazepam 0.5 MG tablet    ATIVAN    15 tablet    Take 1 tablet (0.5 mg) by mouth every 8 hours as needed for anxiety    Respiratory distress       mometasone-formoterol 100-5 MCG/ACT oral inhaler    DULERA    3 Inhaler    INHALE 2 PUFFS BY MOUTH 2 TIMES DAILY    Acute bronchospasm

## 2018-10-31 ASSESSMENT — PATIENT HEALTH QUESTIONNAIRE - PHQ9: SUM OF ALL RESPONSES TO PHQ QUESTIONS 1-9: 3

## 2018-10-31 ASSESSMENT — ANXIETY QUESTIONNAIRES: GAD7 TOTAL SCORE: 7

## 2018-11-01 LAB
Lab: ABNORMAL
SPECIMEN SOURCE: ABNORMAL
YEAST SPEC QL CULT: ABNORMAL

## 2018-12-14 DIAGNOSIS — J98.01 ACUTE BRONCHOSPASM: ICD-10-CM

## 2018-12-17 NOTE — TELEPHONE ENCOUNTER
"Requested Prescriptions   Pending Prescriptions Disp Refills     mometasone-formoterol (DULERA) 100-5 MCG/ACT inhaler [Pharmacy Med Name: DULERA 100 MCG/5 MCG INHALER] 39 Inhaler 3    Last Written Prescription Date:  01/15/2018  Last Fill Quantity: 3,  # refills: 3   Last office visit: 10/30/2018 with prescribing provider:     Future Office Visit:   Sig: INHALE 2 PUFFS BY MOUTH 2 TIMES DAILY    Inhaled Steroids Protocol Passed - 12/14/2018  5:23 PM       Passed - Patient is age 12 or older       Passed - Recent (12 mo) or future (30 days) visit within the authorizing provider's specialty    Patient had office visit in the last 12 months or has a visit in the next 30 days with authorizing provider or within the authorizing provider's specialty.  See \"Patient Info\" tab in inbasket, or \"Choose Columns\" in Meds & Orders section of the refill encounter.              "

## 2018-12-18 ENCOUNTER — MYC MEDICAL ADVICE (OUTPATIENT)
Dept: INTERNAL MEDICINE | Facility: CLINIC | Age: 58
End: 2018-12-18

## 2018-12-20 ENCOUNTER — E-VISIT (OUTPATIENT)
Dept: INTERNAL MEDICINE | Facility: CLINIC | Age: 58
End: 2018-12-20
Payer: COMMERCIAL

## 2018-12-20 DIAGNOSIS — J01.01 ACUTE RECURRENT MAXILLARY SINUSITIS: Primary | ICD-10-CM

## 2018-12-20 DIAGNOSIS — J98.01 ACUTE BRONCHOSPASM: ICD-10-CM

## 2018-12-20 DIAGNOSIS — J18.9 PNEUMONIA OF LEFT LOWER LOBE DUE TO INFECTIOUS ORGANISM: ICD-10-CM

## 2018-12-20 PROCEDURE — 99444 ZZC PHYSICIAN ONLINE EVALUATION & MANAGEMENT SERVICE: CPT | Performed by: INTERNAL MEDICINE

## 2018-12-21 RX ORDER — ALBUTEROL SULFATE 90 UG/1
AEROSOL, METERED RESPIRATORY (INHALATION)
Qty: 1 INHALER | Refills: 5 | Status: SHIPPED | OUTPATIENT
Start: 2018-12-21 | End: 2019-06-28

## 2018-12-21 RX ORDER — FLUTICASONE PROPIONATE 50 MCG
1 SPRAY, SUSPENSION (ML) NASAL DAILY
Qty: 1 BOTTLE | Refills: 3 | Status: SHIPPED | OUTPATIENT
Start: 2018-12-21 | End: 2020-01-20

## 2019-03-15 DIAGNOSIS — B37.0 THRUSH: ICD-10-CM

## 2019-03-15 NOTE — TELEPHONE ENCOUNTER
"Requested Prescriptions   Pending Prescriptions Disp Refills     fluconazole (DIFLUCAN) 100 MG tablet [Pharmacy Med Name: FLUCONAZOLE 100 MG TABLET] 10 tablet 0    Last Written Prescription Date:  10/30/2018  Last Fill Quantity: 10,  # refills: 0   Last office visit: 10/30/2018 with prescribing provider:     Future Office Visit:   Sig: TAKE 1 TABLET BY MOUTH EVERY DAY    Antifungal Agents Failed - 3/15/2019  6:48 AM       Failed - Not Fluconazole or Terconazole     If oral Fluconazole or Terconazole, may refill if indicated in progress notes.          Passed - Recent (12 mo) or future (30 days) visit within the authorizing provider's specialty    Patient had office visit in the last 12 months or has a visit in the next 30 days with authorizing provider or within the authorizing provider's specialty.  See \"Patient Info\" tab in inbasket, or \"Choose Columns\" in Meds & Orders section of the refill encounter.             Passed - Medication is active on med list        "

## 2019-03-18 RX ORDER — FLUCONAZOLE 100 MG/1
TABLET ORAL
Qty: 10 TABLET | Refills: 0 | Status: SHIPPED | OUTPATIENT
Start: 2019-03-18 | End: 2019-04-17

## 2019-03-18 NOTE — TELEPHONE ENCOUNTER
Spoke with patient.  States she takes Flonase NS for her allergies which is helping but causing the thrush in her mouth--tongue, cheeks.  She rinses her mouth out with baking soda and still gets thrush.    Needs refill.    Last office visit 10-30-18    Routing refill request to provider for review/approval because:  Drug not on the [facility] refill protocol     Please advise, thanks.

## 2019-04-15 ENCOUNTER — E-VISIT (OUTPATIENT)
Dept: INTERNAL MEDICINE | Facility: CLINIC | Age: 59
End: 2019-04-15
Payer: COMMERCIAL

## 2019-04-15 DIAGNOSIS — J42 CHRONIC BRONCHITIS, UNSPECIFIED CHRONIC BRONCHITIS TYPE (H): ICD-10-CM

## 2019-04-15 DIAGNOSIS — J06.9 UPPER RESPIRATORY TRACT INFECTION, UNSPECIFIED TYPE: Primary | ICD-10-CM

## 2019-04-15 PROCEDURE — 99444 ZZC PHYSICIAN ONLINE EVALUATION & MANAGEMENT SERVICE: CPT | Performed by: INTERNAL MEDICINE

## 2019-04-15 RX ORDER — PREDNISONE 20 MG/1
TABLET ORAL
Qty: 20 TABLET | Refills: 0 | Status: SHIPPED | OUTPATIENT
Start: 2019-04-15 | End: 2019-04-27

## 2019-04-15 RX ORDER — CODEINE PHOSPHATE AND GUAIFENESIN 10; 100 MG/5ML; MG/5ML
1-2 SOLUTION ORAL EVERY 4 HOURS PRN
Qty: 180 ML | Refills: 0 | Status: SHIPPED | OUTPATIENT
Start: 2019-04-15 | End: 2020-01-20

## 2019-04-16 ENCOUNTER — MYC MEDICAL ADVICE (OUTPATIENT)
Dept: INTERNAL MEDICINE | Facility: CLINIC | Age: 59
End: 2019-04-16

## 2019-04-17 ENCOUNTER — OFFICE VISIT (OUTPATIENT)
Dept: INTERNAL MEDICINE | Facility: CLINIC | Age: 59
End: 2019-04-17
Payer: COMMERCIAL

## 2019-04-17 VITALS
DIASTOLIC BLOOD PRESSURE: 80 MMHG | RESPIRATION RATE: 12 BRPM | TEMPERATURE: 98.4 F | BODY MASS INDEX: 18.16 KG/M2 | WEIGHT: 109 LBS | HEIGHT: 65 IN | SYSTOLIC BLOOD PRESSURE: 140 MMHG | OXYGEN SATURATION: 97 % | HEART RATE: 100 BPM

## 2019-04-17 DIAGNOSIS — J44.1 COPD EXACERBATION (H): ICD-10-CM

## 2019-04-17 DIAGNOSIS — J42 CHRONIC BRONCHITIS, UNSPECIFIED CHRONIC BRONCHITIS TYPE (H): ICD-10-CM

## 2019-04-17 DIAGNOSIS — B37.0 THRUSH: ICD-10-CM

## 2019-04-17 DIAGNOSIS — J06.9 UPPER RESPIRATORY TRACT INFECTION, UNSPECIFIED TYPE: Primary | ICD-10-CM

## 2019-04-17 PROCEDURE — 99213 OFFICE O/P EST LOW 20 MIN: CPT | Performed by: INTERNAL MEDICINE

## 2019-04-17 RX ORDER — FLUCONAZOLE 150 MG/1
150 TABLET ORAL DAILY
Qty: 3 TABLET | Refills: 0 | Status: SHIPPED | OUTPATIENT
Start: 2019-04-17 | End: 2019-07-15

## 2019-04-17 RX ORDER — AZITHROMYCIN 250 MG/1
TABLET, FILM COATED ORAL
Qty: 6 TABLET | Refills: 0 | Status: SHIPPED | OUTPATIENT
Start: 2019-04-17 | End: 2019-04-22

## 2019-04-17 RX ORDER — METHYLPREDNISOLONE 4 MG
TABLET, DOSE PACK ORAL
Qty: 21 TABLET | Refills: 0 | Status: SHIPPED | OUTPATIENT
Start: 2019-04-17 | End: 2020-01-20

## 2019-04-17 ASSESSMENT — MIFFLIN-ST. JEOR: SCORE: 1075.3

## 2019-04-17 NOTE — NURSING NOTE
"Vital signs:  Temp: 98.4  F (36.9  C) Temp src: Oral BP: 140/80 Pulse: 100   Resp: 12 SpO2: 97 %     Height: 165.1 cm (5' 5\") Weight: 49.4 kg (109 lb)  Estimated body mass index is 18.14 kg/m  as calculated from the following:    Height as of this encounter: 1.651 m (5' 5\").    Weight as of this encounter: 49.4 kg (109 lb).          "

## 2019-04-17 NOTE — PROGRESS NOTES
SUBJECTIVE:   Bailee Dixon is a 59 year old female who presents to clinic today for the following   health issues:      URI:  Presents with 3 weeks symptoms of cough, SOB, wheezing.   No fever. Cough productive of green phlegm. Has COPD, still smoking but reduced to 3 a day.   No chest pain, palpitations. Cough with activity and at night. Uses her inhalers.   I have prescribed Augmentin and Prednisone, started 2 days ago but developed rash and nausea, stopped treatment.         Additional history: as documented    Reviewed  and updated as needed this visit by clinical staff         Reviewed and updated as needed this visit by Provider         Patient Active Problem List   Diagnosis     Migraine without aura     CARDIOVASCULAR SCREENING; LDL GOAL LESS THAN 160     Smoking     Chronic low back pain     Acute respiratory failure (H)     NSTEMI (non-ST elevated myocardial infarction) (H)     Anxiety     Chronic bronchitis, unspecified chronic bronchitis type (H)     Past Surgical History:   Procedure Laterality Date     C NONSPECIFIC PROCEDURE      ALEX/BSO for endometriosis     C NONSPECIFIC PROCEDURE      T + A     C NONSPECIFIC PROCEDURE      appy with ALEX       HYSTERECTOMY, PAP NO LONGER INDICATED         Social History     Tobacco Use     Smoking status: Current Some Day Smoker     Packs/day: 0.00     Years: 21.00     Pack years: 0.00     Types: Cigarettes     Last attempt to quit: 10/17/2011     Years since quittin.5     Smokeless tobacco: Never Used     Tobacco comment: started  per pt, occasionally now   Substance Use Topics     Alcohol use: Yes     Alcohol/week: 0.0 oz     Comment: 2-3 drinks weekly     Family History   Problem Relation Age of Onset     Arthritis Mother         SLE     Diabetes Maternal Grandmother          Current Outpatient Medications   Medication Sig Dispense Refill     albuterol (VENTOLIN HFA) 108 (90 Base) MCG/ACT inhaler INHALE 2 PUFFS INTO THE LUNGS EVERY 6 HOURS  "AS NEEDED FOR SHORTNESS OF BREATH / DYSPNEA OR WHEEZING 1 Inhaler 5     azithromycin (ZITHROMAX) 250 MG tablet Take 2 tablets (500 mg) by mouth daily for 1 day, THEN 1 tablet (250 mg) daily for 4 days. 6 tablet 0     EPINEPHrine (EPIPEN) 0.3 MG/0.3ML injection Inject 0.3 mLs (0.3 mg) into the muscle once as needed for anaphylaxis 1 each 1     fluconazole (DIFLUCAN) 150 MG tablet Take 1 tablet (150 mg) by mouth daily for 3 days 3 tablet 0     fluticasone (FLONASE) 50 MCG/ACT nasal spray Spray 1 spray into both nostrils daily 1 Bottle 3     guaiFENesin-codeine (ROBITUSSIN AC) 100-10 MG/5ML solution Take 5-10 mLs by mouth every 4 hours as needed for cough 180 mL 0     LORazepam (ATIVAN) 0.5 MG tablet Take 1 tablet (0.5 mg) by mouth every 8 hours as needed for anxiety 15 tablet 0     methylPREDNISolone (MEDROL DOSEPAK) 4 MG tablet therapy pack Follow Package Directions 21 tablet 0     mometasone-formoterol (DULERA) 100-5 MCG/ACT inhaler INHALE 2 PUFFS BY MOUTH 2 TIMES DAILY 3 Inhaler 2     amoxicillin-clavulanate (AUGMENTIN) 875-125 MG tablet Take 1 tablet by mouth 2 times daily (Patient not taking: Reported on 4/17/2019) 20 tablet 0     predniSONE (DELTASONE) 20 MG tablet Take 60 mg by mouth daily for 3 days, THEN 40 mg daily for 3 days, THEN 20 mg daily for 3 days, THEN 10 mg daily for 3 days. (Patient not taking: No sig reported) 20 tablet 0       ROS:  Constitutional, HEENT, cardiovascular, pulmonary, gi and gu systems are negative, except as otherwise noted.    OBJECTIVE:     /80   Pulse 100   Temp 98.4  F (36.9  C) (Oral)   Resp 12   Ht 1.651 m (5' 5\")   Wt 49.4 kg (109 lb)   SpO2 97%   BMI 18.14 kg/m    Body mass index is 18.14 kg/m .   GENERAL: healthy, alert and no distress  EYES: Eyes grossly normal to inspection, PERRL and conjunctivae and sclerae normal  HENT: ear canals and TM's normal, nose and mouth without ulcers or lesions  NECK: no adenopathy, no asymmetry, masses, or scars and thyroid " normal to palpation  RESP: lungs  - no rales, + bilateral coarse rhonchi or wheezes  CV: regular rate and rhythm, normal S1 S2, no S3 or S4, no murmur, click or rub, no peripheral edema and peripheral pulses strong  ABDOMEN: soft, nontender, no hepatosplenomegaly, no masses and bowel sounds normal  MS: no gross musculoskeletal defects noted, no edema    Diagnostic Test Results:  none     ASSESSMENT/PLAN:     Problem List Items Addressed This Visit     Chronic bronchitis, unspecified chronic bronchitis type (H)      Other Visit Diagnoses     Upper respiratory tract infection, unspecified type    -  Primary    Relevant Medications    azithromycin (ZITHROMAX) 250 MG tablet    methylPREDNISolone (MEDROL DOSEPAK) 4 MG tablet therapy pack    Thrush        Relevant Medications    fluconazole (DIFLUCAN) 150 MG tablet           COPD exacerbation related to acute URI  Start on Zithromax and Medrol , had side effects with Augmentin and Prednisone   Continue inhalers   Tobacco cessation   Diflucan in case of recurrent thrush     Follow-Up:as needed     Robert Singh MD  [facility]

## 2019-04-29 ENCOUNTER — MYC MEDICAL ADVICE (OUTPATIENT)
Dept: INTERNAL MEDICINE | Facility: CLINIC | Age: 59
End: 2019-04-29

## 2019-04-30 ENCOUNTER — MYC MEDICAL ADVICE (OUTPATIENT)
Dept: INTERNAL MEDICINE | Facility: CLINIC | Age: 59
End: 2019-04-30

## 2019-04-30 DIAGNOSIS — J44.1 COPD EXACERBATION (H): Primary | ICD-10-CM

## 2019-04-30 RX ORDER — AZITHROMYCIN 250 MG/1
TABLET, FILM COATED ORAL
Qty: 6 TABLET | Refills: 0 | Status: SHIPPED | OUTPATIENT
Start: 2019-04-30 | End: 2019-05-05

## 2019-04-30 RX ORDER — LORATADINE 10 MG/1
10 TABLET ORAL DAILY
Qty: 30 TABLET | Refills: 0 | Status: SHIPPED | OUTPATIENT
Start: 2019-04-30 | End: 2019-05-28

## 2019-04-30 NOTE — TELEPHONE ENCOUNTER
Let's repeat Z pack and lower dose of Medrol for longer time. Will call these in .   Keep taking the Dulera and Albuterol.   Also can start taking daily Claritin to help with possible allergic component of  symptoms.

## 2019-04-30 NOTE — TELEPHONE ENCOUNTER
See her [employer] message and the other message she sent.    We should put Augmentin on her Allergy list too.     Please advise.

## 2019-04-30 NOTE — TELEPHONE ENCOUNTER
Attempted to contact pt. Left message to call clinic.   Dr Singh will work her in. Try Same day holds first, then if none, OK to use unavailable slot

## 2019-05-28 DIAGNOSIS — J44.1 COPD EXACERBATION (H): ICD-10-CM

## 2019-05-28 RX ORDER — LORATADINE 10 MG/1
TABLET ORAL
Qty: 30 TABLET | Refills: 10 | Status: SHIPPED | OUTPATIENT
Start: 2019-05-28 | End: 2020-01-20

## 2019-05-28 NOTE — TELEPHONE ENCOUNTER
"Requested Prescriptions   Pending Prescriptions Disp Refills     loratadine (CLARITIN) 10 MG tablet [Pharmacy Med Name: LORATADINE 10 MG TABLET] 30 tablet 0     Sig: TAKE 1 TABLET BY MOUTH EVERY DAY       Antihistamines Protocol Passed - 5/28/2019  1:19 AM        Passed - Patient is 3-64 years of age     Apply weight-based dosing for peds patients age 3 - 12 years of age.    Forward request to provider for patients under the age of 3 or over the age of 64.          Passed - Recent (12 mo) or future (30 days) visit within the authorizing provider's specialty     Patient had office visit in the last 12 months or has a visit in the next 30 days with authorizing provider or within the authorizing provider's specialty.  See \"Patient Info\" tab in inbasket, or \"Choose Columns\" in Meds & Orders section of the refill encounter.              Passed - Medication is active on med list        Last Written Prescription Date:  4/30/19  Last Fill Quantity: 30,  # refills: 0   Last office visit: 4/17/2019 with prescribing provider:  Samantha   Future Office Visit:    Prescription approved per [facility] Refill Protocol.  Maryjo Washington RN      "

## 2019-06-03 ENCOUNTER — HOSPITAL ENCOUNTER (OUTPATIENT)
Dept: MRI IMAGING | Facility: CLINIC | Age: 59
Discharge: HOME | End: 2019-06-03
Attending: PHYSICAL MEDICINE & REHABILITATION | Admitting: PHYSICAL MEDICINE & REHABILITATION
Payer: COMMERCIAL

## 2019-06-03 DIAGNOSIS — M54.16 RADICULOPATHY, LUMBAR REGION: ICD-10-CM

## 2019-06-03 PROCEDURE — 72148 MRI LUMBAR SPINE W/O DYE: CPT

## 2019-06-04 ENCOUNTER — MYC MEDICAL ADVICE (OUTPATIENT)
Dept: INTERNAL MEDICINE | Facility: CLINIC | Age: 59
End: 2019-06-04

## 2019-06-04 NOTE — TELEPHONE ENCOUNTER
See mychart message. She will need OV for her symptoms.     Attempted to contact pt. Left message to call clinic.     Will send message back.     OK to use Same Day hold slot.

## 2019-06-18 ENCOUNTER — TELEPHONE (OUTPATIENT)
Dept: INTERNAL MEDICINE | Facility: CLINIC | Age: 59
End: 2019-06-18

## 2019-06-18 NOTE — LETTER
[facility]  [address], [address]  [place] [address]  [phone]        June 18, 2019    Bailee Dixon  [address] [facility] [address]            Dear Ms. Bailee Dixon:      At [facility] we care about your health and well-being. In order to ensure we are providing the best quality care, we have reviewed your chart and see that you are due for:    1. Colonoscopy   2. Mammogram      If you have already had one or all of the above screening tests at another facility, please contact our office to update your chart at [phone].    GI : [phone] to schedule Colonoscopy     Breast Center:  [phone] to schedule Mammogram      Sincerely,        Robert Singh M.D.

## 2019-06-18 NOTE — TELEPHONE ENCOUNTER
Panel Management Review      Patient has the following on her problem list: None      Composite cancer screening  Chart review shows that this patient is due/due soon for the following Mammogram and Colonoscopy  Summary:    Patient is due/failing the following:   COLONOSCOPY and MAMMOGRAM    Action needed:   Patient needs referral/order: Mammogram and Colonoscopy    Type of outreach:    Sent letter.    Questions for provider review:    None                                                                                                                                    Yvonne Hassan MA       Chart routed to none .

## 2019-06-28 DIAGNOSIS — J18.9 PNEUMONIA OF LEFT LOWER LOBE DUE TO INFECTIOUS ORGANISM: ICD-10-CM

## 2019-06-28 RX ORDER — ALBUTEROL SULFATE 90 UG/1
AEROSOL, METERED RESPIRATORY (INHALATION)
Qty: 1 INHALER | Refills: 3 | Status: SHIPPED | OUTPATIENT
Start: 2019-06-28 | End: 2019-10-29

## 2019-06-28 NOTE — TELEPHONE ENCOUNTER
"Requested Prescriptions   Pending Prescriptions Disp Refills     albuterol (VENTOLIN HFA) 108 (90 Base) MCG/ACT inhaler [Pharmacy Med Name: VENTOLIN HFA 90 MCG INHALER] 18 Inhaler 5     Sig: INHALE 2 PUFFS BY MOUTH EVERY 6 HOURS AS NEEDED FOR WHEEZE OR FOR SHORTNESS OF BREATH   Last Written Prescription Date:  12/21/2018  Last Fill Quantity: 1 Inhaler,  # refills: 5   Last office visit: 4/17/2019 with prescribing provider:     Future Office Visit:      Asthma Maintenance Inhalers - Anticholinergics Passed - 6/28/2019  1:20 AM        Passed - Patient is age 12 years or older        Passed - Recent (12 mo) or future (30 days) visit within the authorizing provider's specialty     Patient had office visit in the last 12 months or has a visit in the next 30 days with authorizing provider or within the authorizing provider's specialty.  See \"Patient Info\" tab in inbasket, or \"Choose Columns\" in Meds & Orders section of the refill encounter.              Passed - Medication is active on med list        "

## 2019-07-13 DIAGNOSIS — B37.0 THRUSH: ICD-10-CM

## 2019-07-15 RX ORDER — FLUCONAZOLE 100 MG/1
TABLET ORAL
Qty: 10 TABLET | Refills: 0 | Status: SHIPPED | OUTPATIENT
Start: 2019-07-15 | End: 2019-10-09

## 2019-07-15 NOTE — TELEPHONE ENCOUNTER
"Requested Prescriptions   Pending Prescriptions Disp Refills     fluconazole (DIFLUCAN) 100 MG tablet [Pharmacy Med Name: FLUCONAZOLE 100 MG TABLET] 10 tablet 0     Sig: TAKE 1 TABLET BY MOUTH EVERY DAY   Last Written Prescription Date:  04/17/2019  Last Fill Quantity: 3 tablet,  # refills: 0   Last office visit: 4/17/2019 with prescribing provider:     Future Office Visit:      Antifungal Agents Failed - 7/13/2019 11:16 AM        Failed - Not Fluconazole or Terconazole      If oral Fluconazole or Terconazole, may refill if indicated in progress notes.           Failed - Medication is active on med list        Passed - Recent (12 mo) or future (30 days) visit within the authorizing provider's specialty     Patient had office visit in the last 12 months or has a visit in the next 30 days with authorizing provider or within the authorizing provider's specialty.  See \"Patient Info\" tab in inbasket, or \"Choose Columns\" in Meds & Orders section of the refill encounter.              "

## 2019-07-15 NOTE — TELEPHONE ENCOUNTER
Patient returns call and states that her allergies have been bad and any time she uses Flonase or more than a few days it gives her a yeast infection/Thrush in her mouth.  Please refill.

## 2019-07-15 NOTE — TELEPHONE ENCOUNTER
I did 1 refill, advised patient to make sure that she does not sniff deeply when she uses the Flonase to avoid getting it in her mouth and throat, just sniff very lightly, will keep her head tilted forward after spring so that it does not drip down her throat is easily.  She should rinse her mouth and throat with water after using it.  She could consider using 1 spray twice a day to decrease the amount that drains into the throat.

## 2019-09-06 ENCOUNTER — TELEPHONE (OUTPATIENT)
Dept: INTERNAL MEDICINE | Facility: CLINIC | Age: 59
End: 2019-09-06

## 2019-09-06 NOTE — TELEPHONE ENCOUNTER
Dr Singh has not referral pt for Behavior Health in the past.   There is a SUSAN for [facility] in [place] and S[facility]AN for [facility].   If needs Med records, this would go through [facility].

## 2019-09-06 NOTE — TELEPHONE ENCOUNTER
Patient calling and wants a doctor added to the list of providers she can see and can prescribe medications      Dr Shyanne Andre  [facility] [place]    Contact number [phone]    This doctor was referred to patient by Dr Singh

## 2019-09-29 ENCOUNTER — HEALTH MAINTENANCE LETTER (OUTPATIENT)
Age: 59
End: 2019-09-29

## 2019-10-09 DIAGNOSIS — B37.0 THRUSH: ICD-10-CM

## 2019-10-09 NOTE — TELEPHONE ENCOUNTER
"Requested Prescriptions   Pending Prescriptions Disp Refills     fluconazole (DIFLUCAN) 100 MG tablet [Pharmacy Med Name: FLUCONAZOLE 100 MG TABLET] 10 tablet 0     Sig: TAKE 1 TABLET BY MOUTH EVERY DAY   Last Written Prescription Date:  07/15/2019  Last Fill Quantity: 10,  # refills: 0   Last office visit: 4/17/2019 with prescribing provider:     Future Office Visit:      Antifungal Agents Failed - 10/9/2019  6:43 AM        Failed - Not Fluconazole or Terconazole      If oral Fluconazole or Terconazole, may refill if indicated in progress notes.           Passed - Recent (12 mo) or future (30 days) visit within the authorizing provider's specialty     Patient has had an office visit with the authorizing provider or a provider within the authorizing providers department within the previous 12 mos or has a future within next 30 days. See \"Patient Info\" tab in inbasket, or \"Choose Columns\" in Meds & Orders section of the refill encounter.              Passed - Medication is active on med list        "

## 2019-10-10 RX ORDER — FLUCONAZOLE 100 MG/1
TABLET ORAL
Qty: 10 TABLET | Refills: 0 | Status: SHIPPED | OUTPATIENT
Start: 2019-10-10 | End: 2020-01-20

## 2019-10-10 NOTE — TELEPHONE ENCOUNTER
"Routing refill request to provider for review/approval because:  Fails protocol     Per 4/17/19 office visit note:  \"COPD exacerbation related to acute URI  Start on Zithromax and Medrol , had side effects with Augmentin and Prednisone   Continue inhalers   Tobacco cessation   Diflucan in case of recurrent thrush\"    "

## 2019-10-29 DIAGNOSIS — J18.9 PNEUMONIA OF LEFT LOWER LOBE DUE TO INFECTIOUS ORGANISM: ICD-10-CM

## 2019-10-30 RX ORDER — ALBUTEROL SULFATE 90 UG/1
AEROSOL, METERED RESPIRATORY (INHALATION)
Qty: 18 INHALER | Refills: 1 | Status: SHIPPED | OUTPATIENT
Start: 2019-10-30 | End: 2019-12-19

## 2019-10-30 NOTE — TELEPHONE ENCOUNTER
"Requested Prescriptions   Pending Prescriptions Disp Refills     albuterol (VENTOLIN HFA) 108 (90 Base) MCG/ACT inhaler [Pharmacy Med Name: VENTOLIN HFA 90 MCG INHALER] 18 Inhaler 3     Sig: INHALE 2 PUFFS BY MOUTH EVERY 6 HOURS AS NEEDED FOR WHEEZE OR FOR SHORTNESS OF BREATH   Last Written Prescription Date:  06/28/2019  Last Fill Quantity: 1 Inhaler,  # refills: 3   Last office visit: 4/17/2019 with prescribing provider:     Future Office Visit:      Asthma Maintenance Inhalers - Anticholinergics Passed - 10/29/2019  5:15 PM        Passed - Patient is age 12 years or older        Passed - Recent (12 mo) or future (30 days) visit within the authorizing provider's specialty     Patient has had an office visit with the authorizing provider or a provider within the authorizing providers department within the previous 12 mos or has a future within next 30 days. See \"Patient Info\" tab in inbasket, or \"Choose Columns\" in Meds & Orders section of the refill encounter.              Passed - Medication is active on med list        "

## 2019-12-18 DIAGNOSIS — J18.9 PNEUMONIA OF LEFT LOWER LOBE DUE TO INFECTIOUS ORGANISM: ICD-10-CM

## 2019-12-18 NOTE — TELEPHONE ENCOUNTER
"Requested Prescriptions   Pending Prescriptions Disp Refills     albuterol (VENTOLIN HFA) 108 (90 Base) MCG/ACT inhaler [Pharmacy Med Name: VENTOLIN HFA 90 MCG INHALER] 18 Inhaler 1     Sig: INHALE 2 PUFFS BY MOUTH EVERY 6 HOURS AS NEEDED FOR WHEEZE OR FOR SHORTNESS OF BREATH   Last Written Prescription Date:  10/30/2019  Last Fill Quantity: 18 Inhaler,  # refills: 01   Last office visit: 4/17/2019 with prescribing provider:     Future Office Visit:      Asthma Maintenance Inhalers - Anticholinergics Passed - 12/18/2019  6:45 AM        Passed - Patient is age 12 years or older        Passed - Recent (12 mo) or future (30 days) visit within the authorizing provider's specialty     Patient has had an office visit with the authorizing provider or a provider within the authorizing providers department within the previous 12 mos or has a future within next 30 days. See \"Patient Info\" tab in inbasket, or \"Choose Columns\" in Meds & Orders section of the refill encounter.              Passed - Medication is active on med list        "

## 2019-12-19 DIAGNOSIS — J98.01 ACUTE BRONCHOSPASM: ICD-10-CM

## 2019-12-19 RX ORDER — ALBUTEROL SULFATE 90 UG/1
AEROSOL, METERED RESPIRATORY (INHALATION)
Qty: 18 INHALER | Refills: 1 | Status: SHIPPED | OUTPATIENT
Start: 2019-12-19 | End: 2020-01-20

## 2019-12-19 NOTE — TELEPHONE ENCOUNTER
Last OV on 4/17/19      Return in about 6 months (around 10/17/2019) for Physical Exam.     Call to pt and scheduled.     Prescription [facility] Refill Protocol.    BP Readings from Last 3 Encounters:   04/17/19 140/80   10/30/18 118/70   01/15/18 150/76     Creatinine   Date Value Ref Range Status   01/15/2018 0.66 0.52 - 1.04 mg/dL Final     Potassium   Date Value Ref Range Status   01/15/2018 3.8 3.4 - 5.3 mmol/L Final

## 2019-12-19 NOTE — TELEPHONE ENCOUNTER
"Requested Prescriptions   Pending Prescriptions Disp Refills     mometasone-formoterol (DULERA) 100-5 MCG/ACT inhaler [Pharmacy Med Name: DULERA 100 MCG/5 MCG INHALER] 39 Inhaler 2     Sig: TAKE 2 PUFFS BY MOUTH TWICE A DAY   Last Written Prescription Date:  12/21/2018  Last Fill Quantity: 3 Inhaler,  # refills: 02   Last office visit: 4/17/2019 with prescribing provider:     Future Office Visit:   Next 5 appointments (look out 90 days)    Jan 20, 2020  3:20 PM CST  PHYSICAL with Robert Singh MD  [facility] [address]  [facility] [address]  [phone]           Inhaled Steroids Protocol Passed - 12/19/2019 11:34 AM        Passed - Patient is age 12 or older        Passed - Recent (12 mo) or future (30 days) visit within the authorizing provider's specialty     Patient has had an office visit with the authorizing provider or a provider within the authorizing providers department within the previous 12 mos or has a future within next 30 days. See \"Patient Info\" tab in inbasket, or \"Choose Columns\" in Meds & Orders section of the refill encounter.              Passed - Medication is active on med list        "

## 2019-12-20 NOTE — TELEPHONE ENCOUNTER
Pt has upcoming appt in January   Prescription approved per [facility] Refill Protocol.

## 2020-01-11 DIAGNOSIS — J98.01 ACUTE BRONCHOSPASM: ICD-10-CM

## 2020-01-13 NOTE — TELEPHONE ENCOUNTER
"Requested Prescriptions   Pending Prescriptions Disp Refills     mometasone-formoterol (DULERA) 100-5 MCG/ACT inhaler [Pharmacy Med Name: DULERA 100 MCG/5 MCG INHALER] 13 Inhaler 0     Sig: TAKE 2 PUFFS BY MOUTH TWICE A DAY   Last Written Prescription Date:  12/20/2019  Last Fill Quantity: 13 g,  # refills: 0   Last office visit: 4/17/2019 with prescribing provider:     Future Office Visit:   Next 5 appointments (look out 90 days)    Jan 20, 2020  3:20 PM CST  PHYSICAL with Robert Singh MD  [facility] ([facility] [address]  [facility] [address]  [phone]           Inhaled Steroids Protocol Passed - 1/11/2020  1:37 AM        Passed - Patient is age 12 or older        Passed - Recent (12 mo) or future (30 days) visit within the authorizing provider's specialty     Patient has had an office visit with the authorizing provider or a provider within the authorizing providers department within the previous 12 mos or has a future within next 30 days. See \"Patient Info\" tab in inbasket, or \"Choose Columns\" in Meds & Orders section of the refill encounter.              Passed - Medication is active on med list        "

## 2020-01-17 ASSESSMENT — ENCOUNTER SYMPTOMS
HEMATOCHEZIA: 0
DIZZINESS: 0
COUGH: 0
CHILLS: 0
FREQUENCY: 0
NERVOUS/ANXIOUS: 0
BREAST MASS: 0
DIARRHEA: 0
PALPITATIONS: 0
PARESTHESIAS: 0
SHORTNESS OF BREATH: 0
JOINT SWELLING: 0
DYSURIA: 0
WEAKNESS: 0
SORE THROAT: 0
CONSTIPATION: 0
ARTHRALGIAS: 1
MYALGIAS: 1
HEMATURIA: 0
HEADACHES: 0
FEVER: 0
HEARTBURN: 0
EYE PAIN: 0
NAUSEA: 0
ABDOMINAL PAIN: 0

## 2020-01-20 ENCOUNTER — OFFICE VISIT (OUTPATIENT)
Dept: INTERNAL MEDICINE | Facility: CLINIC | Age: 60
End: 2020-01-20
Payer: COMMERCIAL

## 2020-01-20 VITALS
RESPIRATION RATE: 16 BRPM | WEIGHT: 108 LBS | SYSTOLIC BLOOD PRESSURE: 144 MMHG | TEMPERATURE: 98.2 F | HEART RATE: 88 BPM | OXYGEN SATURATION: 99 % | BODY MASS INDEX: 17.99 KG/M2 | HEIGHT: 65 IN | DIASTOLIC BLOOD PRESSURE: 82 MMHG

## 2020-01-20 DIAGNOSIS — I10 ESSENTIAL HYPERTENSION: ICD-10-CM

## 2020-01-20 DIAGNOSIS — R06.03 RESPIRATORY DISTRESS: ICD-10-CM

## 2020-01-20 DIAGNOSIS — Z12.31 ENCOUNTER FOR SCREENING MAMMOGRAM FOR BREAST CANCER: ICD-10-CM

## 2020-01-20 DIAGNOSIS — J18.9 PNEUMONIA OF LEFT LOWER LOBE DUE TO INFECTIOUS ORGANISM: ICD-10-CM

## 2020-01-20 DIAGNOSIS — Z00.00 ENCOUNTER FOR PREVENTATIVE ADULT HEALTH CARE EXAMINATION: Primary | ICD-10-CM

## 2020-01-20 DIAGNOSIS — Z87.891 PERSONAL HISTORY OF TOBACCO USE, PRESENTING HAZARDS TO HEALTH: ICD-10-CM

## 2020-01-20 DIAGNOSIS — F41.9 ANXIETY: ICD-10-CM

## 2020-01-20 LAB
ALBUMIN UR-MCNC: NEGATIVE MG/DL
APPEARANCE UR: CLEAR
BILIRUB UR QL STRIP: NEGATIVE
COLOR UR AUTO: YELLOW
ERYTHROCYTE [DISTWIDTH] IN BLOOD BY AUTOMATED COUNT: 12.5 % (ref 10–15)
GLUCOSE UR STRIP-MCNC: NEGATIVE MG/DL
HBA1C MFR BLD: 5.2 % (ref 0–5.6)
HCT VFR BLD AUTO: 41.2 % (ref 35–47)
HGB BLD-MCNC: 13.6 G/DL (ref 11.7–15.7)
HGB UR QL STRIP: NEGATIVE
KETONES UR STRIP-MCNC: NEGATIVE MG/DL
LEUKOCYTE ESTERASE UR QL STRIP: NEGATIVE
MCH RBC QN AUTO: 29.8 PG (ref 26.5–33)
MCHC RBC AUTO-ENTMCNC: 33 G/DL (ref 31.5–36.5)
MCV RBC AUTO: 90 FL (ref 78–100)
NITRATE UR QL: NEGATIVE
PH UR STRIP: 6 PH (ref 5–7)
PLATELET # BLD AUTO: 328 10E9/L (ref 150–450)
RBC # BLD AUTO: 4.57 10E12/L (ref 3.8–5.2)
SOURCE: NORMAL
SP GR UR STRIP: 1.01 (ref 1–1.03)
UROBILINOGEN UR STRIP-ACNC: 0.2 EU/DL (ref 0.2–1)
WBC # BLD AUTO: 10.6 10E9/L (ref 4–11)

## 2020-01-20 PROCEDURE — 81003 URINALYSIS AUTO W/O SCOPE: CPT | Performed by: INTERNAL MEDICINE

## 2020-01-20 PROCEDURE — 85027 COMPLETE CBC AUTOMATED: CPT | Performed by: INTERNAL MEDICINE

## 2020-01-20 PROCEDURE — 80061 LIPID PANEL: CPT | Performed by: INTERNAL MEDICINE

## 2020-01-20 PROCEDURE — 36415 COLL VENOUS BLD VENIPUNCTURE: CPT | Performed by: INTERNAL MEDICINE

## 2020-01-20 PROCEDURE — 99213 OFFICE O/P EST LOW 20 MIN: CPT | Mod: 25 | Performed by: INTERNAL MEDICINE

## 2020-01-20 PROCEDURE — 99396 PREV VISIT EST AGE 40-64: CPT | Performed by: INTERNAL MEDICINE

## 2020-01-20 PROCEDURE — 84443 ASSAY THYROID STIM HORMONE: CPT | Performed by: INTERNAL MEDICINE

## 2020-01-20 PROCEDURE — 83036 HEMOGLOBIN GLYCOSYLATED A1C: CPT | Performed by: INTERNAL MEDICINE

## 2020-01-20 PROCEDURE — 80053 COMPREHEN METABOLIC PANEL: CPT | Performed by: INTERNAL MEDICINE

## 2020-01-20 RX ORDER — LOSARTAN POTASSIUM 25 MG/1
25 TABLET ORAL DAILY
Qty: 90 TABLET | Refills: 3 | Status: SHIPPED | OUTPATIENT
Start: 2020-01-20 | End: 2020-03-25

## 2020-01-20 RX ORDER — ALBUTEROL SULFATE 90 UG/1
AEROSOL, METERED RESPIRATORY (INHALATION)
Qty: 18 INHALER | Refills: 1 | Status: SHIPPED | OUTPATIENT
Start: 2020-01-20 | End: 2020-03-03

## 2020-01-20 RX ORDER — LORAZEPAM 0.5 MG/1
0.5 TABLET ORAL EVERY 8 HOURS PRN
Qty: 15 TABLET | Refills: 0 | Status: ON HOLD | OUTPATIENT
Start: 2020-01-20 | End: 2020-09-26

## 2020-01-20 ASSESSMENT — ENCOUNTER SYMPTOMS
WEAKNESS: 0
FEVER: 0
PALPITATIONS: 0
PARESTHESIAS: 0
DIZZINESS: 0
HEMATOCHEZIA: 0
HEADACHES: 0
HEMATURIA: 0
EYE PAIN: 0
DIARRHEA: 0
JOINT SWELLING: 0
ABDOMINAL PAIN: 0
DYSURIA: 0
FREQUENCY: 0
SHORTNESS OF BREATH: 0
ARTHRALGIAS: 1
BREAST MASS: 0
CHILLS: 0
NAUSEA: 0
NERVOUS/ANXIOUS: 0
MYALGIAS: 1
HEARTBURN: 0
COUGH: 0
SORE THROAT: 0
CONSTIPATION: 0

## 2020-01-20 ASSESSMENT — MIFFLIN-ST. JEOR: SCORE: 1070.76

## 2020-01-20 NOTE — LETTER
[facility]  [address], [address]  [place] [address]  [phone]        January 22, 2020    Bailee Dixon  [address] [facility] [address]            Dear Ms. Bailee Dixon:      The results of your recent labs were NORMAL.      If you have any further questions or problems, please contact our office.      Sincerely,        Robert Singh M.D.

## 2020-01-20 NOTE — PROGRESS NOTES
SUBJECTIVE:   CC: Bailee Dixon is an 59 year old woman who presents for preventive health visit.     Healthy Habits:     Getting at least 3 servings of Calcium per day:  Yes    Bi-annual eye exam:  Yes    Dental care twice a year:  Yes    Sleep apnea or symptoms of sleep apnea:  None    Diet:  Regular (no restrictions)    Frequency of exercise:  2-3 days/week    Duration of exercise:  Less than 15 minutes    Taking medications regularly:  Yes    PHQ-2 Total Score: 0          PROBLEMS TO ADD ON...  Has had increased cough, non productive, no SOB, fever, CP. Has been smoking 3-4 cigarettes a day for 3 months. Prior to that had quit smoking.       Today's PHQ-2 Score: 0  PHQ-2 (  [employer]) 2020   Q1: Little interest or pleasure in doing things 0   Q2: Feeling down, depressed or hopeless 0   PHQ-2 Score 0   Q1: Little interest or pleasure in doing things Not at all   Q2: Feeling down, depressed or hopeless Not at all   PHQ-2 Score 0       Abuse: Current or Past(Physical, Sexual or Emotional)- No  Do you feel safe in your environment? Yes      Social History     Tobacco Use     Smoking status: Current Some Day Smoker     Packs/day: 0.00     Years: 21.00     Pack years: 0.00     Types: Cigarettes     Last attempt to quit: 10/17/2011     Years since quittin.2     Smokeless tobacco: Never Used     Tobacco comment: started  per pt, occasionally now   Substance Use Topics     Alcohol use: Yes     Alcohol/week: 0.0 standard drinks     Comment: 2-3 drinks weekly     If you drink alcohol do you typically have >3 drinks per day or >7 drinks per week? No    Alcohol Use 2020   Prescreen: >3 drinks/day or >7 drinks/week? No   No flowsheet data found.    Reviewed orders with patient.  Reviewed health maintenance and updated orders accordingly - Yes  Lab work is in process  Labs reviewed in [facility]    Mammogram Screening: Patient over age 50, mutual decision to screen reflected in health  maintenance.    Pertinent mammograms are reviewed under the imaging tab.  History of abnormal Pap smear: Status post benign hysterectomy. Health Maintenance and Surgical History updated.     Reviewed and updated as needed this visit by clinical staff  Tobacco         Reviewed and updated as needed this visit by Provider            Review of Systems   Constitutional: Negative for chills and fever.   HENT: Negative for congestion, ear pain, hearing loss and sore throat.    Eyes: Negative for pain and visual disturbance.   Respiratory: Negative for cough and shortness of breath.    Cardiovascular: Negative for chest pain, palpitations and peripheral edema.   Gastrointestinal: Negative for abdominal pain, constipation, diarrhea, heartburn, hematochezia and nausea.   Breasts:  Negative for tenderness, breast mass and discharge.   Genitourinary: Negative for dysuria, frequency, genital sores, hematuria, pelvic pain, urgency, vaginal bleeding and vaginal discharge.   Musculoskeletal: Positive for arthralgias and myalgias. Negative for joint swelling.   Skin: Negative for rash.   Neurological: Negative for dizziness, weakness, headaches and paresthesias.   Psychiatric/Behavioral: Negative for mood changes. The patient is not nervous/anxious.      CONSTITUTIONAL: NEGATIVE for fever, chills, change in weight  INTEGUMENTARY/SKIN: NEGATIVE for worrisome rashes, moles or lesions  EYES: NEGATIVE for vision changes or irritation  ENT: NEGATIVE for ear, mouth and throat problems  RESP: NEGATIVE for significant cough or SOB  BREAST: NEGATIVE for masses, tenderness or discharge  CV: NEGATIVE for chest pain, palpitations or peripheral edema  GI: NEGATIVE for nausea, abdominal pain, heartburn, or change in bowel habits  : NEGATIVE for unusual urinary or vaginal symptoms. No vaginal bleeding.  MUSCULOSKELETAL: NEGATIVE for significant arthralgias or myalgia  NEURO: NEGATIVE for weakness, dizziness or paresthesias  PSYCHIATRIC:  "NEGATIVE for changes in mood or affect      OBJECTIVE:   Ht 1.651 m (5' 5\")   BMI 18.14 kg/m    Physical Exam  GENERAL: healthy, alert and no distress  EYES: Eyes grossly normal to inspection, PERRL and conjunctivae and sclerae normal  HENT: ear canals and TM's normal, nose and mouth without ulcers or lesions  NECK: no adenopathy, no asymmetry, masses, or scars and thyroid normal to palpation  RESP: lungs clear to auscultation - no rales, rhonchi or wheezes  CV: regular rate and rhythm, normal S1 S2, no S3 or S4, no murmur, click or rub, no peripheral edema and peripheral pulses strong  ABDOMEN: soft, nontender, no hepatosplenomegaly, no masses and bowel sounds normal  MS: no gross musculoskeletal defects noted, no edema  SKIN: no suspicious lesions or rashes  NEURO: Normal strength and tone, mentation intact and speech normal  PSYCH: mentation appears normal, affect normal/bright    Diagnostic Test Results:  Labs reviewed in Epic    ASSESSMENT/PLAN:       ICD-10-CM    1. Encounter for preventative adult health care examination Z00.00 CBC with platelets     Comprehensive metabolic panel     Lipid panel reflex to direct LDL Non-fasting     TSH with free T4 reflex     *UA reflex to Microscopic     Hemoglobin A1c   2. Respiratory distress R06.03 LORazepam (ATIVAN) 0.5 MG tablet   3. Pneumonia of left lower lobe due to infectious organism (H) J18.1 albuterol (VENTOLIN HFA) 108 (90 Base) MCG/ACT inhaler   4. Encounter for screening mammogram for breast cancer Z12.31 *MA Screening Digital Bilateral   5. Essential hypertension I10 losartan (COZAAR) 25 MG tablet   6. Personal history of tobacco use, presenting hazards to health Z87.891 CT Chest Lung Cancer Scrn Low Dose wo   7. Anxiety F41.9      Assess lab work   Assess chest CT  Start on BP treatment   Pt declined colonoscopy and immunizations     COUNSELING:  Reviewed preventive health counseling, as reflected in patient instructions       Regular exercise       Healthy " "diet/nutrition       Vision screening       Hearing screening       Colon cancer screening    Estimated body mass index is 18.14 kg/m  as calculated from the following:    Height as of this encounter: 1.651 m (5' 5\").    Weight as of 4/17/19: 49.4 kg (109 lb).         reports that she has been smoking cigarettes. She has been smoking about 0.00 packs per day for the past 21.00 years. She has never used smokeless tobacco.  Tobacco Cessation Action Plan: Self help information given to patient    Counseling Resources:  ATP IV Guidelines  Pooled Cohorts Equation Calculator  Breast Cancer Risk Calculator  FRAX Risk Assessment  ICSI Preventive Guidelines  Dietary Guidelines for Americans, 2010  USDA's MyPlate  ASA Prophylaxis  Lung CA Screening    Robert Singh MD  [facility]  "

## 2020-01-20 NOTE — NURSING NOTE
"Vital signs:  Temp: 98.2  F (36.8  C) Temp src: Oral BP: (!) 144/82 Pulse: 88   Resp: 16 SpO2: 99 %     Height: 165.1 cm (5' 5\") Weight: 49 kg (108 lb)  Estimated body mass index is 17.97 kg/m  as calculated from the following:    Height as of this encounter: 1.651 m (5' 5\").    Weight as of this encounter: 49 kg (108 lb).        "

## 2020-01-21 LAB
ALBUMIN SERPL-MCNC: 4.3 G/DL (ref 3.4–5)
ALP SERPL-CCNC: 118 U/L (ref 40–150)
ALT SERPL W P-5'-P-CCNC: 19 U/L (ref 0–50)
ANION GAP SERPL CALCULATED.3IONS-SCNC: 9 MMOL/L (ref 3–14)
AST SERPL W P-5'-P-CCNC: 12 U/L (ref 0–45)
BILIRUB SERPL-MCNC: 0.3 MG/DL (ref 0.2–1.3)
BUN SERPL-MCNC: 8 MG/DL (ref 7–30)
CALCIUM SERPL-MCNC: 9.1 MG/DL (ref 8.5–10.1)
CHLORIDE SERPL-SCNC: 103 MMOL/L (ref 94–109)
CHOLEST SERPL-MCNC: 196 MG/DL
CO2 SERPL-SCNC: 22 MMOL/L (ref 20–32)
CREAT SERPL-MCNC: 0.66 MG/DL (ref 0.52–1.04)
GFR SERPL CREATININE-BSD FRML MDRD: >90 ML/MIN/{1.73_M2}
GLUCOSE SERPL-MCNC: 100 MG/DL (ref 70–99)
HDLC SERPL-MCNC: 102 MG/DL
LDLC SERPL CALC-MCNC: 80 MG/DL
NONHDLC SERPL-MCNC: 94 MG/DL
POTASSIUM SERPL-SCNC: 4.4 MMOL/L (ref 3.4–5.3)
PROT SERPL-MCNC: 7.6 G/DL (ref 6.8–8.8)
SODIUM SERPL-SCNC: 134 MMOL/L (ref 133–144)
TRIGL SERPL-MCNC: 68 MG/DL
TSH SERPL DL<=0.005 MIU/L-ACNC: 2.57 MU/L (ref 0.4–4)

## 2020-01-27 ENCOUNTER — MYC MEDICAL ADVICE (OUTPATIENT)
Dept: INTERNAL MEDICINE | Facility: CLINIC | Age: 60
End: 2020-01-27

## 2020-01-31 DIAGNOSIS — J18.9 PNEUMONIA OF LEFT LOWER LOBE DUE TO INFECTIOUS ORGANISM: ICD-10-CM

## 2020-01-31 RX ORDER — ALBUTEROL SULFATE 90 UG/1
AEROSOL, METERED RESPIRATORY (INHALATION)
Qty: 18 INHALER | Refills: 1
Start: 2020-01-31

## 2020-01-31 NOTE — TELEPHONE ENCOUNTER
"Requested Prescriptions   Pending Prescriptions Disp Refills     albuterol (VENTOLIN HFA) 108 (90 Base) MCG/ACT inhaler [Pharmacy Med Name: VENTOLIN HFA 90 MCG INHALER] 18 Inhaler 1     Sig: INHALE 2 PUFFS BY MOUTH EVERY 6 HOURS AS NEEDED FOR WHEEZE OR FOR SHORTNESS OF BREATH   Last Written Prescription Date:  01/20/2020  Last Fill Quantity: 18,  # refills: 01   Last office visit: 1/20/2020 with prescribing provider:     Future Office Visit:      Asthma Maintenance Inhalers - Anticholinergics Passed - 1/31/2020  2:07 AM        Passed - Patient is age 12 years or older        Passed - Recent (12 mo) or future (30 days) visit within the authorizing provider's specialty     Patient has had an office visit with the authorizing provider or a provider within the authorizing providers department within the previous 12 mos or has a future within next 30 days. See \"Patient Info\" tab in inbasket, or \"Choose Columns\" in Meds & Orders section of the refill encounter.              Passed - Medication is active on med list        "

## 2020-02-14 DIAGNOSIS — J98.01 ACUTE BRONCHOSPASM: ICD-10-CM

## 2020-02-14 NOTE — TELEPHONE ENCOUNTER
This was prescribed for acute brochospasm. Pt has not scheduled her CT chest scan.     Provider approval needed.

## 2020-02-14 NOTE — TELEPHONE ENCOUNTER
"Requested Prescriptions   Pending Prescriptions Disp Refills     mometasone-formoterol (DULERA) 100-5 MCG/ACT inhaler [Pharmacy Med Name: DULERA 100 MCG/5 MCG INHALER] 13 Inhaler 0     Sig: TAKE 2 PUFFS BY MOUTH TWICE A DAY   Last Written Prescription Date:  01/13/2020  Last Fill Quantity: 13 inhaler,  # refills: 0   Last office visit: 1/20/2020 with prescribing provider:     Future Office Visit:      Inhaled Steroids Protocol Passed - 2/14/2020  1:54 AM        Passed - Patient is age 12 or older        Passed - Recent (12 mo) or future (30 days) visit within the authorizing provider's specialty     Patient has had an office visit with the authorizing provider or a provider within the authorizing providers department within the previous 12 mos or has a future within next 30 days. See \"Patient Info\" tab in inbasket, or \"Choose Columns\" in Meds & Orders section of the refill encounter.              Passed - Medication is active on med list        "

## 2020-02-29 DIAGNOSIS — J42 CHRONIC BRONCHITIS, UNSPECIFIED CHRONIC BRONCHITIS TYPE (H): Primary | ICD-10-CM

## 2020-02-29 DIAGNOSIS — J18.9 PNEUMONIA OF LEFT LOWER LOBE DUE TO INFECTIOUS ORGANISM: ICD-10-CM

## 2020-03-02 NOTE — TELEPHONE ENCOUNTER
"Requested Prescriptions   Pending Prescriptions Disp Refills     albuterol (VENTOLIN HFA) 108 (90 Base) MCG/ACT inhaler [Pharmacy Med Name: VENTOLIN HFA 90 MCG INHALER] 18 Inhaler 1     Sig: INHALE 2 PUFFS BY MOUTH EVERY 6 HOURS AS NEEDED FOR WHEEZE OR FOR SHORTNESS OF BREATH   Last Written Prescription Date:  01/20/2020  Last Fill Quantity: 18 Inhaler,  # refills: 01   Last office visit: 1/20/2020 with prescribing provider:     Future Office Visit:      Asthma Maintenance Inhalers - Anticholinergics Passed - 2/29/2020 10:53 PM        Passed - Patient is age 12 years or older        Passed - Recent (12 mo) or future (30 days) visit within the authorizing provider's specialty     Patient has had an office visit with the authorizing provider or a provider within the authorizing providers department within the previous 12 mos or has a future within next 30 days. See \"Patient Info\" tab in inbasket, or \"Choose Columns\" in Meds & Orders section of the refill encounter.              Passed - Medication is active on med list        "

## 2020-03-03 RX ORDER — ALBUTEROL SULFATE 90 UG/1
AEROSOL, METERED RESPIRATORY (INHALATION)
Qty: 18 INHALER | Refills: 11 | Status: SHIPPED | OUTPATIENT
Start: 2020-03-03 | End: 2020-10-09

## 2020-03-03 NOTE — TELEPHONE ENCOUNTER
Albuterol inhaler refill request.     Prescription approved per [facility] Refill Protocol.

## 2020-03-12 ENCOUNTER — E-VISIT (OUTPATIENT)
Dept: INTERNAL MEDICINE | Facility: CLINIC | Age: 60
End: 2020-03-12
Payer: COMMERCIAL

## 2020-03-12 DIAGNOSIS — R05.9 COUGH: Primary | ICD-10-CM

## 2020-03-12 PROCEDURE — 99421 OL DIG E/M SVC 5-10 MIN: CPT | Performed by: INTERNAL MEDICINE

## 2020-03-13 ENCOUNTER — MYC MEDICAL ADVICE (OUTPATIENT)
Dept: INTERNAL MEDICINE | Facility: CLINIC | Age: 60
End: 2020-03-13

## 2020-03-13 ENCOUNTER — TELEPHONE (OUTPATIENT)
Dept: INTERNAL MEDICINE | Facility: CLINIC | Age: 60
End: 2020-03-13

## 2020-03-13 DIAGNOSIS — J44.1 COPD EXACERBATION (H): Primary | ICD-10-CM

## 2020-03-13 RX ORDER — CODEINE PHOSPHATE AND GUAIFENESIN 10; 100 MG/5ML; MG/5ML
1-2 SOLUTION ORAL EVERY 4 HOURS PRN
Qty: 180 ML | Refills: 0 | Status: ON HOLD | OUTPATIENT
Start: 2020-03-13 | End: 2020-09-26

## 2020-03-13 NOTE — TELEPHONE ENCOUNTER
She recently had Clindamycin. So I wanted her to not take antibiotic yet.   Will prescribe if she develops productive cough, fever.

## 2020-03-13 NOTE — TELEPHONE ENCOUNTER
Patient had a E Visit and was told she would get a antibiotic but nothing was sent. Please call and ok to nichole [phone]

## 2020-03-16 DIAGNOSIS — J98.01 ACUTE BRONCHOSPASM: ICD-10-CM

## 2020-03-16 RX ORDER — AZITHROMYCIN 250 MG/1
TABLET, FILM COATED ORAL
Qty: 6 TABLET | Refills: 0 | Status: SHIPPED | OUTPATIENT
Start: 2020-03-16 | End: 2020-03-25

## 2020-03-16 NOTE — TELEPHONE ENCOUNTER
See 3/13 MyChart encounter. Patient called back and said that she has not had clindamycin recently. Stated it was back in August for a tooth problem. Call to patient. She has been using over the counter decongestants and nasal spray. States her head hurts, she is coughing and is coughing up yellow/green phlegm. Also taking Mucinex. Patient has been feeling hot cold but has not checked her temperature. Requesting antibiotic.

## 2020-03-16 NOTE — TELEPHONE ENCOUNTER
Message in 3/12 evisit:    March 13, 2020   Robert Singh MD   to Bailee Dixon          11:49 AM   Alex Morocho,   If you recently had Clindamycin it usually works for sinus infections as well.   I will call in cough syrup.   Keep the OTC decongestants.   Will try to avoid too frequent antibiotic use.   Try to use nasal steroid spray- Flonase.   Let me know next week how you are.    And    11:52 AM      Provider E-Visit time total (minutes): 5     Will do symptomatic treatment.   Reassess in a week.         Left message for patient to call back to further triage. Has she been using over the counter decongestants, nasal spray? Is she worse or just not getting better? Does she have a productive cough or fever?

## 2020-03-16 NOTE — TELEPHONE ENCOUNTER
Patient calls back and said she has not had clindamycin recently. She says it was back in August for a tooth problem when she took this.

## 2020-03-18 RX ORDER — MOMETASONE FUROATE AND FORMOTEROL FUMARATE DIHYDRATE 100; 5 UG/1; UG/1
AEROSOL RESPIRATORY (INHALATION)
Qty: 13 INHALER | Refills: 0 | OUTPATIENT
Start: 2020-03-18

## 2020-03-18 RX ORDER — MOMETASONE FUROATE AND FORMOTEROL FUMARATE DIHYDRATE 100; 5 UG/1; UG/1
AEROSOL RESPIRATORY (INHALATION)
Qty: 13 G | Refills: 0 | Status: SHIPPED | OUTPATIENT
Start: 2020-03-18 | End: 2020-04-14

## 2020-03-18 NOTE — TELEPHONE ENCOUNTER
Dulera was last refilled 2/14/2020, at which time 1 inhaler was given. Patient was last evaluated by E-Visit 3/12/2020, as reviewed in chart.  Patient may require reevaluation if her symptoms have not improved.  Dulera refills per primary provider.

## 2020-03-18 NOTE — TELEPHONE ENCOUNTER
Pt just started on her Zpak 4 days ago. See the message that she is still ill. Will fax in inhaler.

## 2020-03-20 DIAGNOSIS — B37.0 THRUSH: ICD-10-CM

## 2020-03-20 NOTE — TELEPHONE ENCOUNTER
"Requested Prescriptions   Pending Prescriptions Disp Refills     fluconazole (DIFLUCAN) 100 MG tablet [Pharmacy Med Name: FLUCONAZOLE 100 MG TABLET] 10 tablet 0     Sig: TAKE 1 TABLET BY MOUTH EVERY DAY   Last Written Prescription Date:  Not on meds list  Last Fill Quantity: n/a,  # refills: n/a   Last office visit: 1/20/2020 with prescribing provider:     Future Office Visit:      Antifungal Agents Failed - 3/20/2020 10:32 AM        Failed - Not Fluconazole or Terconazole      If oral Fluconazole or Terconazole, may refill if indicated in progress notes.           Failed - Medication is active on med list        Passed - Recent (12 mo) or future (30 days) visit within the authorizing provider's specialty     Patient has had an office visit with the authorizing provider or a provider within the authorizing providers department within the previous 12 mos or has a future within next 30 days. See \"Patient Info\" tab in inbasket, or \"Choose Columns\" in Meds & Orders section of the refill encounter.                 "

## 2020-03-23 RX ORDER — FLUCONAZOLE 100 MG/1
TABLET ORAL
Qty: 10 TABLET | Refills: 0 | OUTPATIENT
Start: 2020-03-23

## 2020-03-24 NOTE — TELEPHONE ENCOUNTER
Pt just did E-visit on 3/12/20 for Z-maile, sinusitis.   Pt has had Fluconazole prescription in the past. For thrush or yeast.     Will route to PCP.

## 2020-03-25 ENCOUNTER — VIRTUAL VISIT (OUTPATIENT)
Dept: INTERNAL MEDICINE | Facility: CLINIC | Age: 60
End: 2020-03-25
Payer: COMMERCIAL

## 2020-03-25 DIAGNOSIS — B37.0 ORAL THRUSH: Primary | ICD-10-CM

## 2020-03-25 PROCEDURE — 99213 OFFICE O/P EST LOW 20 MIN: CPT | Mod: TEL | Performed by: INTERNAL MEDICINE

## 2020-03-25 RX ORDER — FLUCONAZOLE 100 MG/1
TABLET ORAL
Qty: 10 TABLET | Refills: 0 | Status: SHIPPED | OUTPATIENT
Start: 2020-03-25 | End: 2020-10-09

## 2020-03-25 RX ORDER — FLUCONAZOLE 100 MG/1
100 TABLET ORAL DAILY
Qty: 10 TABLET | Refills: 0 | OUTPATIENT
Start: 2020-03-25

## 2020-03-25 ASSESSMENT — ENCOUNTER SYMPTOMS: TROUBLE SWALLOWING: 1

## 2020-03-25 NOTE — TELEPHONE ENCOUNTER
Pt states she has thrush, she is peeling it off the top of her mouth.   Feels it down the back of her throat.     She states she uses Baking soda water gargles.    Anytime she is on antibiotics, she gets this.      Happening for years.     Did not notice, pt now has appt at 1:20 today with Dr Clayton.

## 2020-03-25 NOTE — PROGRESS NOTES
Subjective     Bailee Dixon is a 60 year old female who presents to clinic today for the following health issues:    HPI   Patient has history of oral thrush that affects her throat and mouth.  Patient gets it every time she uses antibiotic.  Patient has been using baking soda water gargles.  Patient had a phone visit for sinus infection on 3/13/2030 and azithromycin was sent.  Following that her symptoms are acting up.  Patient usually have 10 Diflucan on hand to help with her oral thrush.    Review of Systems   HENT: Positive for trouble swallowing.         Assessment & Plan     Bailee was seen today for mouth/lip problem.    Diagnoses and all orders for this visit:    Oral thrush  -     fluconazole (DIFLUCAN) 100 MG tablet; As needed for oral thrush    Telephone visit time: 5 minutes    Cuong Clayton MD  [facility]

## 2020-03-25 NOTE — TELEPHONE ENCOUNTER
Why she needs it? Is it because of recent antibiotic use? Or she has oral thrush? She can have phone visit to discuss.    Cuong Clayton MD

## 2020-04-13 DIAGNOSIS — J98.01 ACUTE BRONCHOSPASM: ICD-10-CM

## 2020-04-13 NOTE — TELEPHONE ENCOUNTER
"Requested Prescriptions   Pending Prescriptions Disp Refills     DULERA 100-5 MCG/ACT inhaler [Pharmacy Med Name: DULERA 100 MCG-5 MCG INHALER] 13 Inhaler 0     Sig: TAKE 2 PUFFS BY MOUTH TWICE A DAY   Last Written Prescription Date:  03/18/2020  Last Fill Quantity: 13 g,  # refills: 0   Last office visit: 01/20/2020 with prescribing provider:     Future Office Visit:      Long-Acting Beta Agonist Inhalers Protocol  Failed - 4/13/2020 12:18 AM        Failed - Order for Serevent, Striverdi, or Foradil and pt has steroid inhaler        Passed - Patient is age 12 or older        Passed - Recent (12 mo) or future (30 days) visit within the authorizing provider's specialty     Patient has had an office visit with the authorizing provider or a provider within the authorizing providers department within the previous 12 mos or has a future within next 30 days. See \"Patient Info\" tab in inbasket, or \"Choose Columns\" in Meds & Orders section of the refill encounter.              Passed - Medication is active on med list       Inhaled Steroids Protocol Passed - 4/13/2020 12:18 AM        Passed - Patient is age 12 or older        Passed - Recent (12 mo) or future (30 days) visit within the authorizing provider's specialty     Patient has had an office visit with the authorizing provider or a provider within the authorizing providers department within the previous 12 mos or has a future within next 30 days. See \"Patient Info\" tab in inbasket, or \"Choose Columns\" in Meds & Orders section of the refill encounter.              Passed - Medication is active on med list           "

## 2020-04-14 RX ORDER — MOMETASONE FUROATE AND FORMOTEROL FUMARATE DIHYDRATE 100; 5 UG/1; UG/1
AEROSOL RESPIRATORY (INHALATION)
Qty: 13 INHALER | Refills: 0 | Status: SHIPPED | OUTPATIENT
Start: 2020-04-14 | End: 2020-05-11

## 2020-04-14 NOTE — TELEPHONE ENCOUNTER
Last OV on 1/20/20     Provider approval needed. Diagnosis is acute bronchospasms.     No flowsheet data found.

## 2020-05-11 DIAGNOSIS — J98.01 ACUTE BRONCHOSPASM: ICD-10-CM

## 2020-05-11 RX ORDER — MOMETASONE FUROATE AND FORMOTEROL FUMARATE DIHYDRATE 100; 5 UG/1; UG/1
AEROSOL RESPIRATORY (INHALATION)
Qty: 13 INHALER | Refills: 0 | Status: SHIPPED | OUTPATIENT
Start: 2020-05-11 | End: 2020-06-09

## 2020-06-07 DIAGNOSIS — J98.01 ACUTE BRONCHOSPASM: ICD-10-CM

## 2020-06-09 RX ORDER — MOMETASONE FUROATE AND FORMOTEROL FUMARATE DIHYDRATE 100; 5 UG/1; UG/1
AEROSOL RESPIRATORY (INHALATION)
Qty: 13 G | Refills: 5 | Status: SHIPPED | OUTPATIENT
Start: 2020-06-09 | End: 2020-10-09

## 2020-06-09 NOTE — TELEPHONE ENCOUNTER
Dulera inhaler refill request.     Last VV on 3/25/20    Prescription approved per [facility] Refill Protocol.

## 2020-08-11 NOTE — TELEPHONE ENCOUNTER
Dulera       Last Written Prescription Date: 05/05/17  Last Fill Quantity: 13g, # refills: 1    Last Office Visit with G, P or [facility] prescribing provider:  05/05/17   Future Office Visit:       Date of Last Asthma Action Plan Letter:   There are no preventive care reminders to display for this patient.   Asthma Control Test: No flowsheet data found.    Date of Last Spirometry Test:   No results found for this or any previous visit.             standing

## 2020-09-26 ENCOUNTER — VIRTUAL VISIT (OUTPATIENT)
Dept: FAMILY MEDICINE | Facility: OTHER | Age: 60
End: 2020-09-26
Payer: COMMERCIAL

## 2020-09-26 ENCOUNTER — HOSPITAL ENCOUNTER (INPATIENT)
Facility: CLINIC | Age: 60
LOS: 2 days | Discharge: HOME | DRG: 871 | End: 2020-09-28
Attending: EMERGENCY MEDICINE | Admitting: INTERNAL MEDICINE
Payer: COMMERCIAL

## 2020-09-26 ENCOUNTER — APPOINTMENT (OUTPATIENT)
Dept: GENERAL RADIOLOGY | Facility: CLINIC | Age: 60
DRG: 871 | End: 2020-09-26
Attending: EMERGENCY MEDICINE
Payer: COMMERCIAL

## 2020-09-26 DIAGNOSIS — E87.1 HYPONATREMIA: ICD-10-CM

## 2020-09-26 DIAGNOSIS — J44.1 COPD EXACERBATION (H): ICD-10-CM

## 2020-09-26 DIAGNOSIS — Z20.822 PERSON UNDER INVESTIGATION FOR COVID-19: ICD-10-CM

## 2020-09-26 DIAGNOSIS — R50.9 FEVER, UNSPECIFIED FEVER CAUSE: ICD-10-CM

## 2020-09-26 LAB
ANION GAP SERPL CALCULATED.3IONS-SCNC: 8 MMOL/L (ref 3–14)
BASE DEFICIT BLDV-SCNC: 0.6 MMOL/L
BASOPHILS # BLD AUTO: 0.1 10E9/L (ref 0–0.2)
BASOPHILS NFR BLD AUTO: 0.4 %
BUN SERPL-MCNC: 4 MG/DL (ref 7–30)
CALCIUM SERPL-MCNC: 9.2 MG/DL (ref 8.5–10.1)
CHLORIDE SERPL-SCNC: 96 MMOL/L (ref 94–109)
CO2 SERPL-SCNC: 23 MMOL/L (ref 20–32)
CREAT SERPL-MCNC: 0.53 MG/DL (ref 0.52–1.04)
CREAT SERPL-MCNC: 0.54 MG/DL (ref 0.52–1.04)
D DIMER PPP FEU-MCNC: 0.4 UG/ML FEU (ref 0–0.5)
DIFFERENTIAL METHOD BLD: ABNORMAL
EOSINOPHIL # BLD AUTO: 0.1 10E9/L (ref 0–0.7)
EOSINOPHIL NFR BLD AUTO: 0.4 %
ERYTHROCYTE [DISTWIDTH] IN BLOOD BY AUTOMATED COUNT: 11.4 % (ref 10–15)
GFR SERPL CREATININE-BSD FRML MDRD: >90 ML/MIN/{1.73_M2}
GFR SERPL CREATININE-BSD FRML MDRD: >90 ML/MIN/{1.73_M2}
GLUCOSE SERPL-MCNC: 146 MG/DL (ref 70–99)
HCO3 BLDV-SCNC: 23 MMOL/L (ref 21–28)
HCT VFR BLD AUTO: 43.9 % (ref 35–47)
HGB BLD-MCNC: 15 G/DL (ref 11.7–15.7)
IMM GRANULOCYTES # BLD: 0.1 10E9/L (ref 0–0.4)
IMM GRANULOCYTES NFR BLD: 0.6 %
LACTATE BLD-SCNC: 1.6 MMOL/L (ref 0.7–2)
LYMPHOCYTES # BLD AUTO: 1.9 10E9/L (ref 0.8–5.3)
LYMPHOCYTES NFR BLD AUTO: 8.3 %
MCH RBC QN AUTO: 30.4 PG (ref 26.5–33)
MCHC RBC AUTO-ENTMCNC: 34.2 G/DL (ref 31.5–36.5)
MCV RBC AUTO: 89 FL (ref 78–100)
MONOCYTES # BLD AUTO: 2.1 10E9/L (ref 0–1.3)
MONOCYTES NFR BLD AUTO: 9.3 %
NEUTROPHILS # BLD AUTO: 18.4 10E9/L (ref 1.6–8.3)
NEUTROPHILS NFR BLD AUTO: 81 %
NRBC # BLD AUTO: 0 10*3/UL
NRBC BLD AUTO-RTO: 0 /100
NT-PROBNP SERPL-MCNC: 143 PG/ML (ref 0–900)
O2/TOTAL GAS SETTING VFR VENT: ABNORMAL %
PCO2 BLDV: 34 MM HG (ref 40–50)
PH BLDV: 7.44 PH (ref 7.32–7.43)
PLATELET # BLD AUTO: 334 10E9/L (ref 150–450)
PO2 BLDV: 45 MM HG (ref 25–47)
POTASSIUM SERPL-SCNC: 3.7 MMOL/L (ref 3.4–5.3)
RBC # BLD AUTO: 4.94 10E12/L (ref 3.8–5.2)
SODIUM SERPL-SCNC: 127 MMOL/L (ref 133–144)
TROPONIN I SERPL-MCNC: <0.015 UG/L (ref 0–0.04)
WBC # BLD AUTO: 22.7 10E9/L (ref 4–11)

## 2020-09-26 PROCEDURE — 94640 AIRWAY INHALATION TREATMENT: CPT

## 2020-09-26 PROCEDURE — 85379 FIBRIN DEGRADATION QUANT: CPT | Performed by: EMERGENCY MEDICINE

## 2020-09-26 PROCEDURE — 36415 COLL VENOUS BLD VENIPUNCTURE: CPT

## 2020-09-26 PROCEDURE — 83880 ASSAY OF NATRIURETIC PEPTIDE: CPT | Performed by: EMERGENCY MEDICINE

## 2020-09-26 PROCEDURE — C9803 HOPD COVID-19 SPEC COLLECT: HCPCS

## 2020-09-26 PROCEDURE — 36415 COLL VENOUS BLD VENIPUNCTURE: CPT | Performed by: EMERGENCY MEDICINE

## 2020-09-26 PROCEDURE — 80048 BASIC METABOLIC PNL TOTAL CA: CPT | Performed by: EMERGENCY MEDICINE

## 2020-09-26 PROCEDURE — 96375 TX/PRO/DX INJ NEW DRUG ADDON: CPT

## 2020-09-26 PROCEDURE — 25000125 ZZHC RX 250: Performed by: EMERGENCY MEDICINE

## 2020-09-26 PROCEDURE — 99285 EMERGENCY DEPT VISIT HI MDM: CPT | Mod: 25

## 2020-09-26 PROCEDURE — 25800030 ZZH RX IP 258 OP 636: Performed by: INTERNAL MEDICINE

## 2020-09-26 PROCEDURE — 84484 ASSAY OF TROPONIN QUANT: CPT | Performed by: EMERGENCY MEDICINE

## 2020-09-26 PROCEDURE — 40000986 XR CHEST PORT 1 VW

## 2020-09-26 PROCEDURE — 99223 1ST HOSP IP/OBS HIGH 75: CPT | Mod: AI | Performed by: INTERNAL MEDICINE

## 2020-09-26 PROCEDURE — 25000128 H RX IP 250 OP 636: Performed by: INTERNAL MEDICINE

## 2020-09-26 PROCEDURE — 83605 ASSAY OF LACTIC ACID: CPT | Performed by: EMERGENCY MEDICINE

## 2020-09-26 PROCEDURE — 87040 BLOOD CULTURE FOR BACTERIA: CPT | Performed by: EMERGENCY MEDICINE

## 2020-09-26 PROCEDURE — 82803 BLOOD GASES ANY COMBINATION: CPT | Performed by: EMERGENCY MEDICINE

## 2020-09-26 PROCEDURE — 96365 THER/PROPH/DIAG IV INF INIT: CPT

## 2020-09-26 PROCEDURE — 82565 ASSAY OF CREATININE: CPT | Performed by: EMERGENCY MEDICINE

## 2020-09-26 PROCEDURE — 93005 ELECTROCARDIOGRAM TRACING: CPT

## 2020-09-26 PROCEDURE — 85025 COMPLETE CBC W/AUTO DIFF WBC: CPT | Performed by: EMERGENCY MEDICINE

## 2020-09-26 PROCEDURE — 40000275 ZZH STATISTIC RCP TIME EA 10 MIN

## 2020-09-26 PROCEDURE — 96361 HYDRATE IV INFUSION ADD-ON: CPT

## 2020-09-26 PROCEDURE — 25800030 ZZH RX IP 258 OP 636: Performed by: EMERGENCY MEDICINE

## 2020-09-26 PROCEDURE — U0003 INFECTIOUS AGENT DETECTION BY NUCLEIC ACID (DNA OR RNA); SEVERE ACUTE RESPIRATORY SYNDROME CORONAVIRUS 2 (SARS-COV-2) (CORONAVIRUS DISEASE [COVID-19]), AMPLIFIED PROBE TECHNIQUE, MAKING USE OF HIGH THROUGHPUT TECHNOLOGIES AS DESCRIBED BY CMS-2020-01-R: HCPCS | Performed by: EMERGENCY MEDICINE

## 2020-09-26 PROCEDURE — 12000000 ZZH R&B MED SURG/OB

## 2020-09-26 PROCEDURE — 25000128 H RX IP 250 OP 636: Performed by: EMERGENCY MEDICINE

## 2020-09-26 PROCEDURE — 99421 OL DIG E/M SVC 5-10 MIN: CPT | Performed by: PHYSICIAN ASSISTANT

## 2020-09-26 PROCEDURE — 94660 CPAP INITIATION&MGMT: CPT

## 2020-09-26 RX ORDER — ONDANSETRON 2 MG/ML
4 INJECTION INTRAMUSCULAR; INTRAVENOUS EVERY 6 HOURS PRN
Status: DISCONTINUED | OUTPATIENT
Start: 2020-09-26 | End: 2020-09-28 | Stop reason: HOSPADM

## 2020-09-26 RX ORDER — SODIUM CHLORIDE 9 MG/ML
1000 INJECTION, SOLUTION INTRAVENOUS CONTINUOUS
Status: DISCONTINUED | OUTPATIENT
Start: 2020-09-26 | End: 2020-09-27

## 2020-09-26 RX ORDER — ACETAMINOPHEN 325 MG/1
325-650 TABLET ORAL EVERY 4 HOURS PRN
COMMUNITY
End: 2024-07-05

## 2020-09-26 RX ORDER — CEFTRIAXONE 1 G/1
1 INJECTION, POWDER, FOR SOLUTION INTRAMUSCULAR; INTRAVENOUS EVERY 24 HOURS
Status: DISCONTINUED | OUTPATIENT
Start: 2020-09-27 | End: 2020-09-26

## 2020-09-26 RX ORDER — LIDOCAINE 40 MG/G
CREAM TOPICAL
Status: DISCONTINUED | OUTPATIENT
Start: 2020-09-26 | End: 2020-09-28 | Stop reason: HOSPADM

## 2020-09-26 RX ORDER — SODIUM CHLORIDE 9 MG/ML
1000 INJECTION, SOLUTION INTRAVENOUS CONTINUOUS
Status: DISCONTINUED | OUTPATIENT
Start: 2020-09-26 | End: 2020-09-26

## 2020-09-26 RX ORDER — IPRATROPIUM BROMIDE AND ALBUTEROL SULFATE 2.5; .5 MG/3ML; MG/3ML
3 SOLUTION RESPIRATORY (INHALATION)
Status: COMPLETED | OUTPATIENT
Start: 2020-09-26 | End: 2020-09-26

## 2020-09-26 RX ORDER — METHYLPREDNISOLONE SODIUM SUCCINATE 40 MG/ML
40 INJECTION, POWDER, LYOPHILIZED, FOR SOLUTION INTRAMUSCULAR; INTRAVENOUS EVERY 8 HOURS
Status: DISCONTINUED | OUTPATIENT
Start: 2020-09-27 | End: 2020-09-28 | Stop reason: HOSPADM

## 2020-09-26 RX ORDER — MAGNESIUM SULFATE HEPTAHYDRATE 40 MG/ML
2 INJECTION, SOLUTION INTRAVENOUS ONCE
Status: COMPLETED | OUTPATIENT
Start: 2020-09-26 | End: 2020-09-26

## 2020-09-26 RX ORDER — METHYLPREDNISOLONE SODIUM SUCCINATE 125 MG/2ML
125 INJECTION, POWDER, LYOPHILIZED, FOR SOLUTION INTRAMUSCULAR; INTRAVENOUS ONCE
Status: COMPLETED | OUTPATIENT
Start: 2020-09-26 | End: 2020-09-26

## 2020-09-26 RX ORDER — CEFTRIAXONE 1 G/1
1 INJECTION, POWDER, FOR SOLUTION INTRAMUSCULAR; INTRAVENOUS EVERY 24 HOURS
Status: DISCONTINUED | OUTPATIENT
Start: 2020-09-26 | End: 2020-09-26

## 2020-09-26 RX ORDER — NALOXONE HYDROCHLORIDE 0.4 MG/ML
.1-.4 INJECTION, SOLUTION INTRAMUSCULAR; INTRAVENOUS; SUBCUTANEOUS
Status: DISCONTINUED | OUTPATIENT
Start: 2020-09-26 | End: 2020-09-28 | Stop reason: HOSPADM

## 2020-09-26 RX ORDER — ONDANSETRON 4 MG/1
4 TABLET, ORALLY DISINTEGRATING ORAL EVERY 6 HOURS PRN
Status: DISCONTINUED | OUTPATIENT
Start: 2020-09-26 | End: 2020-09-28 | Stop reason: HOSPADM

## 2020-09-26 RX ORDER — CEFTRIAXONE 1 G/1
1 INJECTION, POWDER, FOR SOLUTION INTRAMUSCULAR; INTRAVENOUS ONCE
Status: COMPLETED | OUTPATIENT
Start: 2020-09-26 | End: 2020-09-26

## 2020-09-26 RX ORDER — IPRATROPIUM BROMIDE AND ALBUTEROL SULFATE 2.5; .5 MG/3ML; MG/3ML
3 SOLUTION RESPIRATORY (INHALATION)
Status: DISCONTINUED | OUTPATIENT
Start: 2020-09-27 | End: 2020-09-27

## 2020-09-26 RX ORDER — ACETAMINOPHEN 325 MG/1
650 TABLET ORAL EVERY 4 HOURS PRN
Status: DISCONTINUED | OUTPATIENT
Start: 2020-09-26 | End: 2020-09-28 | Stop reason: HOSPADM

## 2020-09-26 RX ORDER — CEFTRIAXONE 1 G/1
1 INJECTION, POWDER, FOR SOLUTION INTRAMUSCULAR; INTRAVENOUS EVERY 24 HOURS
Status: DISCONTINUED | OUTPATIENT
Start: 2020-09-27 | End: 2020-09-28

## 2020-09-26 RX ADMIN — IPRATROPIUM BROMIDE AND ALBUTEROL SULFATE 3 ML: .5; 3 SOLUTION RESPIRATORY (INHALATION) at 20:38

## 2020-09-26 RX ADMIN — SODIUM CHLORIDE 500 ML: 9 INJECTION, SOLUTION INTRAVENOUS at 20:16

## 2020-09-26 RX ADMIN — IPRATROPIUM BROMIDE AND ALBUTEROL SULFATE 3 ML: .5; 3 SOLUTION RESPIRATORY (INHALATION) at 19:40

## 2020-09-26 RX ADMIN — AZITHROMYCIN MONOHYDRATE 500 MG: 500 INJECTION, POWDER, LYOPHILIZED, FOR SOLUTION INTRAVENOUS at 22:44

## 2020-09-26 RX ADMIN — METHYLPREDNISOLONE SODIUM SUCCINATE 125 MG: 125 INJECTION, POWDER, FOR SOLUTION INTRAMUSCULAR; INTRAVENOUS at 19:31

## 2020-09-26 RX ADMIN — CEFTRIAXONE SODIUM 1 G: 1 INJECTION, POWDER, FOR SOLUTION INTRAMUSCULAR; INTRAVENOUS at 21:01

## 2020-09-26 RX ADMIN — IPRATROPIUM BROMIDE AND ALBUTEROL SULFATE 3 ML: .5; 3 SOLUTION RESPIRATORY (INHALATION) at 21:01

## 2020-09-26 RX ADMIN — SODIUM CHLORIDE 1000 ML: 9 INJECTION, SOLUTION INTRAVENOUS at 22:43

## 2020-09-26 RX ADMIN — MAGNESIUM SULFATE 2 G: 2 INJECTION INTRAVENOUS at 19:31

## 2020-09-26 ASSESSMENT — ACTIVITIES OF DAILY LIVING (ADL)
TRANSFERRING: 0-->INDEPENDENT
SWALLOWING: 0-->SWALLOWS FOODS/LIQUIDS WITHOUT DIFFICULTY
AMBULATION: 0-->INDEPENDENT
BATHING: 0-->INDEPENDENT
NUMBER_OF_TIMES_PATIENT_HAS_FALLEN_WITHIN_LAST_SIX_MONTHS: 1
FALL_HISTORY_WITHIN_LAST_SIX_MONTHS: YES
DRESS: 0-->INDEPENDENT
RETIRED_EATING: 0-->INDEPENDENT
TOILETING: 0-->INDEPENDENT
COGNITION: 0 - NO COGNITION ISSUES REPORTED
RETIRED_COMMUNICATION: 0-->UNDERSTANDS/COMMUNICATES WITHOUT DIFFICULTY

## 2020-09-26 ASSESSMENT — ENCOUNTER SYMPTOMS
DIARRHEA: 0
NAUSEA: 1
ARTHRALGIAS: 0
APPETITE CHANGE: 1
FEVER: 1
COUGH: 1
SHORTNESS OF BREATH: 1
MYALGIAS: 1
CHEST TIGHTNESS: 1
UNEXPECTED WEIGHT CHANGE: 0

## 2020-09-26 NOTE — ED PROVIDER NOTES
History     Chief Complaint:  Chest Pain and Shortness of Breath    HPI   Bailee Dixon is a 60 year old female with a history of NSTEMI, COPD, hyperlipidemia, and CAD who presents for evaluation of multiple symptoms. The patient reports 7 days ago she developed a persistent cough and yesterday she developed a fever of 101.6F, shortness of breath, body aches, headaches, rash, nausea, and a loss of appetite. She notes difficulty breathing and associated chest tightness and chest pain. She has used her Ventolin inhaler with mild alleviation. She had a virtual visit with her doctor today who advised her to get tested for COVID, prompting her to come to the ED. She states she is unable to sleep flat on her back and now sleeps on her recliner. She denies any known exposure to COVID. She denies leg swelling and a history of blood clots. She denies a history of hypertension. She denies loss of taste/smell, jaw pain, recent weight changes, and diarrhea.     Allergies:  Doxycycline   Losartan      Medications:    Dulera  Diflucan  Ativan  Ventolin    Past Medical History:    Chronic bronchitis  Anxiety  NSTEMI  Acute respiratory failure  Chronic low back pain  Smoking  Migraine without aura  CAD  DDD  Hyperlipidemia   COPD    Past Surgical History:    Appendectomy   ALEX/BSO for endometriosis   Tonsillectomy and Adenoidectomy   Hysterectomy     Family History:    Arthritis   Hypertension   Asthma   Thyroid disease    Social History:  Smoking status- former smoker  Alcohol use- yes  Drug use- no   Marital Status:        Review of Systems   Constitutional: Positive for appetite change and fever. Negative for unexpected weight change.   Respiratory: Positive for cough, chest tightness and shortness of breath.    Cardiovascular: Positive for chest pain. Negative for leg swelling.   Gastrointestinal: Positive for nausea. Negative for diarrhea.   Musculoskeletal: Positive for myalgias. Negative for arthralgias.    Skin: Positive for rash.   All other systems reviewed and are negative.      Physical Exam     Patient Vitals for the past 24 hrs:   BP Temp Temp src Pulse Resp SpO2   09/26/20 2208 -- -- -- -- -- 94 %   09/26/20 2207 (!) 142/77 99.1  F (37.3  C) Oral 110 22 91 %   09/26/20 2145 -- -- -- 112 23 93 %   09/26/20 2130 (!) 130/99 -- -- 114 -- 93 %   09/26/20 2115 (!) 127/98 -- -- 114 -- 91 %   09/26/20 2100 115/83 -- -- 114 -- 91 %   09/26/20 2045 134/84 -- -- 118 27 90 %   09/26/20 2030 (!) 122/92 -- -- 106 25 99 %   09/26/20 2015 (!) 129/90 -- -- 121 27 98 %   09/26/20 2000 (!) 123/90 -- -- 118 21 98 %   09/26/20 1945 (!) 123/90 -- -- 124 22 100 %   09/26/20 1940 -- -- -- -- 27 97 %   09/26/20 1915 (!) 153/99 -- -- 135 (!) 34 94 %   09/26/20 1900 (!) 183/119 98.6  F (37  C) Oral 133 26 91 %     Physical Exam  General: Alert, mild distress 2/2 shortness of breath, sitting upright on gurney; speaking in 4-5 word sentences  HEENT:  Moist mucous membranes.  Conjunctiva normal.   CV:  Tachycardic, regular rhythm, no m/r/g, skin warm and well perfused  Pulm:  Diffuse wheezing, prolonged expiratory phase.  Tachypneic with accessory muscle use.  GI:  Soft, nontender, nondistended.  No rebound or guarding.  Normal bowel sounds  MSK:  Moving all extremities.  No focal areas of edema, erythema, or tenderness  Skin:  WWP, no rashes, no lower extremity edema, skin color normal, no diaphoresis  Psych:  Well-appearing, normal affect, regular speech    Emergency Department Course     ECG (19:07:39):  Rate 132 bpm. AZ interval 134. QRS duration 72. QT/QTc 296/438. P-R-T axes 80 86 -9. Sinus tachycardia. ST & T wave abnormality, consider inferior ischemia. Abnormal ECG. When compared with ECG of 17-DEC-2016 21:08, non-specific change in ST segment in inferior leads. Non-specific change in ST segment in anterior leads. T wave inversion now evident in inferior leads. T wave inversion no longer evident in anterior leads. Interpreted at  1908 by Mal Mancini MD.     Imaging:  Radiology findings were communicated with the patient who voiced understanding of the findings.    XR Chest Port 1 View:  Negative chest.  As read by Radiology.     Laboratory:  Laboratory findings were communicated with the patient who voiced understanding of the findings.    CBC: WBC 22.7 (H), Absolute Neutrophil 18.4 (H), Absolute Monocyte 2.1 (H), o/w WNL (HGB 15.0, )  BMP: Glucose 146 (H), Sodium: 127 (L), Urea Nitrogen: 4 (L), o/w WNL (Creatinine: 0.53)    Lactic Acid: 1.6   Blood gas venous and oxyhgb: pH 7.44 (H), pCO2 34 (L), pO2 45, Bicarbonate 23, FIO2 room air, Base Deficit 0.6   Troponin (Collected 1915): <0.015   D-dimer: 0.4  Nt probnp inpatient (BNP): 143   Blood Culture x2: Pending     Symptomatic COVID-19 Virus by PCR: pending    Interventions:  1931 solu-Medrol 125 mg IV    1931 Magnesium Sulfate 2 g IV    1940 Duoneb 3 mL Nebulization    2016 NS 1L IV Bolus    2101 Rocephin 1 g IV     Emergency Department Course:  Past medical records, nursing notes, and vitals reviewed.    1859 I performed an exam of the patient as documented above.     1910 EKG obtained in the ED, see results above.     1916 IV was inserted and blood was drawn for laboratory testing, results above.    1917 The patient was sent for a XR chest while in the emergency department, results above.     2000 I rechecked the patient and discussed the results of their workup thus far.     2044 I spoke to Dr. Yang of the hospitalist service who accepts care of the patient.      Findings and plan explained to the Patient who consents to admission. Discussed the patient with Dr. Yang, who will admit the patient to a med/surg bed for further monitoring, evaluation, and treatment.    Impression & Plan     Covid-19  Bailee Dixon was evaluated during a global COVID-19 pandemic, which necessitated consideration that the patient might be at risk for infection with the SARS-CoV-2 virus  that causes COVID-19.   Applicable protocols for evaluation were followed during the patient's care.   COVID-19 was considered as part of the patient's evaluation. The plan for testing is:  a test was obtained during this visit.       Medical Decision Making:  Bailee Dixon is a 60 year old female who presents to the ER for evaluation of coughing and shortness of breath.  Please see HPI for specifics.  Fever of 101 yesterday.  She is afebrile here but she did take antipyretic prior to coming to the ER.  She is noted to be tachypneic with increased work of breathing and accessory muscle use.  Differential includes COPD exacerbation, pneumonia, COVID-19, ACS, CHF amongst other things.  She was placed on BiPAP due to her work of breathing and appeared much more comfortable.  She was given bronchodilators and was able to be weaned off of BiPAP.  EKG shows sinus tachycardia without any acute ST changes concerning for ischemia or infarct and troponin is within normal limits.  Her lab studies are remarkable for negative d-dimer, normal BNP, normal troponin, leukocytosis with normal lactic acid.  Chest x-ray shows no evidence of pneumonia.  COVID-19 swab was sent and pending.  Suspect symptoms are from COPD exacerbation but given fever and increased bleeding production, IV ceftriaxone was given that she is allergic to Levaquin.  We will admit the patient for continued monitoring and care.  IV steroid and magnesium administered in the ER.    Total critical care Emergency physician time in direct patient evaluation and management of this patient, exclusive of procedures:  35 minutes      Diagnosis:    ICD-10-CM    1. COPD exacerbation (H)  J44.1 Blood culture   2. Fever, unspecified fever cause  R50.9    3. Person under investigation for COVID-19  Z20.828    4. Hyponatremia  E87.1      Disposition:  Admitted to Dr. Yang.    Scribe Disclosure:  Tracie EVANS, am serving as a scribe at 7:03 PM on 9/26/2020 to document  services personally performed by Mal Mancini MD based on my observations and the provider's statements to me.        Mal Mancini MD  09/26/20 0922       Mal Mancini MD  09/26/20 3424

## 2020-09-27 LAB
ANION GAP SERPL CALCULATED.3IONS-SCNC: 7 MMOL/L (ref 3–14)
BUN SERPL-MCNC: 3 MG/DL (ref 7–30)
CALCIUM SERPL-MCNC: 8.4 MG/DL (ref 8.5–10.1)
CHLORIDE SERPL-SCNC: 103 MMOL/L (ref 94–109)
CO2 SERPL-SCNC: 25 MMOL/L (ref 20–32)
CREAT SERPL-MCNC: 0.47 MG/DL (ref 0.52–1.04)
ERYTHROCYTE [DISTWIDTH] IN BLOOD BY AUTOMATED COUNT: 11.4 % (ref 10–15)
FLUAV+FLUBV RNA SPEC QL NAA+PROBE: NEGATIVE
FLUAV+FLUBV RNA SPEC QL NAA+PROBE: NEGATIVE
GFR SERPL CREATININE-BSD FRML MDRD: >90 ML/MIN/{1.73_M2}
GLUCOSE SERPL-MCNC: 151 MG/DL (ref 70–99)
HCT VFR BLD AUTO: 39.7 % (ref 35–47)
HGB BLD-MCNC: 13.3 G/DL (ref 11.7–15.7)
INTERPRETATION ECG - MUSE: NORMAL
L PNEUMO1 AG UR QL IA: NORMAL
LABORATORY COMMENT REPORT: NORMAL
MCH RBC QN AUTO: 30.5 PG (ref 26.5–33)
MCHC RBC AUTO-ENTMCNC: 33.5 G/DL (ref 31.5–36.5)
MCV RBC AUTO: 91 FL (ref 78–100)
PLATELET # BLD AUTO: 283 10E9/L (ref 150–450)
POTASSIUM SERPL-SCNC: 4.1 MMOL/L (ref 3.4–5.3)
RBC # BLD AUTO: 4.36 10E12/L (ref 3.8–5.2)
RSV RNA SPEC NAA+PROBE: NEGATIVE
SARS-COV-2 RNA SPEC QL NAA+PROBE: NEGATIVE
SARS-COV-2 RNA SPEC QL NAA+PROBE: NORMAL
SARS-COV-2 RNA SPEC QL NAA+PROBE: NORMAL
SODIUM SERPL-SCNC: 135 MMOL/L (ref 133–144)
SPECIMEN SOURCE: NORMAL
WBC # BLD AUTO: 16.8 10E9/L (ref 4–11)

## 2020-09-27 PROCEDURE — 25000132 ZZH RX MED GY IP 250 OP 250 PS 637: Performed by: INTERNAL MEDICINE

## 2020-09-27 PROCEDURE — 40000275 ZZH STATISTIC RCP TIME EA 10 MIN

## 2020-09-27 PROCEDURE — U0003 INFECTIOUS AGENT DETECTION BY NUCLEIC ACID (DNA OR RNA); SEVERE ACUTE RESPIRATORY SYNDROME CORONAVIRUS 2 (SARS-COV-2) (CORONAVIRUS DISEASE [COVID-19]), AMPLIFIED PROBE TECHNIQUE, MAKING USE OF HIGH THROUGHPUT TECHNOLOGIES AS DESCRIBED BY CMS-2020-01-R: HCPCS | Performed by: INTERNAL MEDICINE

## 2020-09-27 PROCEDURE — 25800030 ZZH RX IP 258 OP 636: Performed by: INTERNAL MEDICINE

## 2020-09-27 PROCEDURE — 36415 COLL VENOUS BLD VENIPUNCTURE: CPT | Performed by: INTERNAL MEDICINE

## 2020-09-27 PROCEDURE — 80048 BASIC METABOLIC PNL TOTAL CA: CPT | Performed by: INTERNAL MEDICINE

## 2020-09-27 PROCEDURE — 87899 AGENT NOS ASSAY W/OPTIC: CPT | Performed by: INTERNAL MEDICINE

## 2020-09-27 PROCEDURE — 12000000 ZZH R&B MED SURG/OB

## 2020-09-27 PROCEDURE — 40000274 ZZH STATISTIC RCP CONSULT EA 30 MIN

## 2020-09-27 PROCEDURE — 99233 SBSQ HOSP IP/OBS HIGH 50: CPT | Performed by: INTERNAL MEDICINE

## 2020-09-27 PROCEDURE — 86738 MYCOPLASMA ANTIBODY: CPT | Performed by: INTERNAL MEDICINE

## 2020-09-27 PROCEDURE — 85027 COMPLETE CBC AUTOMATED: CPT | Performed by: INTERNAL MEDICINE

## 2020-09-27 PROCEDURE — 25000128 H RX IP 250 OP 636: Performed by: INTERNAL MEDICINE

## 2020-09-27 PROCEDURE — 87631 RESP VIRUS 3-5 TARGETS: CPT | Performed by: INTERNAL MEDICINE

## 2020-09-27 RX ORDER — SODIUM CHLORIDE 9 MG/ML
1000 INJECTION, SOLUTION INTRAVENOUS CONTINUOUS
Status: DISCONTINUED | OUTPATIENT
Start: 2020-09-27 | End: 2020-09-28

## 2020-09-27 RX ORDER — ALBUTEROL SULFATE 90 UG/1
2 AEROSOL, METERED RESPIRATORY (INHALATION) EVERY 6 HOURS PRN
Status: DISCONTINUED | OUTPATIENT
Start: 2020-09-27 | End: 2020-09-28 | Stop reason: HOSPADM

## 2020-09-27 RX ORDER — ALBUTEROL SULFATE 90 UG/1
2 AEROSOL, METERED RESPIRATORY (INHALATION) 4 TIMES DAILY
Status: DISCONTINUED | OUTPATIENT
Start: 2020-09-27 | End: 2020-09-28 | Stop reason: HOSPADM

## 2020-09-27 RX ORDER — LORAZEPAM 0.5 MG/1
0.5 TABLET ORAL
Status: DISCONTINUED | OUTPATIENT
Start: 2020-09-27 | End: 2020-09-28 | Stop reason: HOSPADM

## 2020-09-27 RX ADMIN — CEFTRIAXONE 1 G: 1 INJECTION, POWDER, FOR SOLUTION INTRAMUSCULAR; INTRAVENOUS at 19:42

## 2020-09-27 RX ADMIN — ALBUTEROL SULFATE 2 PUFF: 90 INHALANT RESPIRATORY (INHALATION) at 10:47

## 2020-09-27 RX ADMIN — METHYLPREDNISOLONE SODIUM SUCCINATE 40 MG: 40 INJECTION, POWDER, FOR SOLUTION INTRAMUSCULAR; INTRAVENOUS at 18:04

## 2020-09-27 RX ADMIN — METHYLPREDNISOLONE SODIUM SUCCINATE 40 MG: 40 INJECTION, POWDER, FOR SOLUTION INTRAMUSCULAR; INTRAVENOUS at 10:48

## 2020-09-27 RX ADMIN — AZITHROMYCIN MONOHYDRATE 500 MG: 500 INJECTION, POWDER, LYOPHILIZED, FOR SOLUTION INTRAVENOUS at 22:04

## 2020-09-27 RX ADMIN — ALBUTEROL SULFATE 2 PUFF: 90 AEROSOL, METERED RESPIRATORY (INHALATION) at 16:43

## 2020-09-27 RX ADMIN — Medication 1 MG: at 00:21

## 2020-09-27 RX ADMIN — SODIUM CHLORIDE 1000 ML: 9 INJECTION, SOLUTION INTRAVENOUS at 01:54

## 2020-09-27 RX ADMIN — ALBUTEROL SULFATE 2 PUFF: 90 AEROSOL, METERED RESPIRATORY (INHALATION) at 11:46

## 2020-09-27 RX ADMIN — LORAZEPAM 0.5 MG: 0.5 TABLET ORAL at 23:05

## 2020-09-27 RX ADMIN — IPRATROPIUM BROMIDE 2 PUFF: 17 AEROSOL, METERED RESPIRATORY (INHALATION) at 16:42

## 2020-09-27 RX ADMIN — SODIUM CHLORIDE 1000 ML: 9 INJECTION, SOLUTION INTRAVENOUS at 16:27

## 2020-09-27 RX ADMIN — ACETAMINOPHEN 650 MG: 325 TABLET, FILM COATED ORAL at 18:03

## 2020-09-27 RX ADMIN — ALBUTEROL SULFATE 2 PUFF: 90 AEROSOL, METERED RESPIRATORY (INHALATION) at 19:50

## 2020-09-27 RX ADMIN — ACETAMINOPHEN 650 MG: 325 TABLET, FILM COATED ORAL at 11:54

## 2020-09-27 RX ADMIN — METHYLPREDNISOLONE SODIUM SUCCINATE 40 MG: 40 INJECTION, POWDER, FOR SOLUTION INTRAMUSCULAR; INTRAVENOUS at 01:52

## 2020-09-27 RX ADMIN — SODIUM CHLORIDE 1000 ML: 9 INJECTION, SOLUTION INTRAVENOUS at 11:46

## 2020-09-27 RX ADMIN — IPRATROPIUM BROMIDE 2 PUFF: 17 AEROSOL, METERED RESPIRATORY (INHALATION) at 19:50

## 2020-09-27 ASSESSMENT — ACTIVITIES OF DAILY LIVING (ADL)
ADLS_ACUITY_SCORE: 11
ADLS_ACUITY_SCORE: 13

## 2020-09-27 NOTE — ED TRIAGE NOTES
Patient complaining of two days of shortness of breath and chest tightness.      ABCs intact.  Alert and oriented x 3.

## 2020-09-27 NOTE — PROGRESS NOTES
VSS, pt on 2L. Alert and oriented x 4. Arrived to unit w/ NS running in PIV. On airborne precautions pending symptomatic covid testing. Reports no difficulty breathing. Up independently. IV zithro started. Discharge pending.

## 2020-09-27 NOTE — ED NOTES
"[facility]  ED Nurse Handoff Report    Bailee Dixon is a 60 year old female   ED Chief complaint: Chest Pain and Shortness of Breath  . ED Diagnosis:   Final diagnoses:   COPD exacerbation (H)   Fever, unspecified fever cause   Person under investigation for COVID-19   Hyponatremia     Allergies:   Allergies   Allergen Reactions     Levaquin [Levofloxacin] Shortness Of Breath     Bee Venom      Doxycycline      \"swelling of hands and feet\"     Losartan      Turns red        Code Status: Full Code  Activity level - Baseline/Home:  Independent. Activity Level - Current:   Independent. Lift room needed: No. Bariatric: No   Needed: No   Isolation: Yes. Infection: Not Applicable  COVID r/o and special precautions.     Vital Signs:   Vitals:    09/26/20 2000 09/26/20 2015 09/26/20 2030 09/26/20 2045   BP: (!) 123/90 (!) 129/90 (!) 122/92 134/84   Pulse: 118 121 106 118   Resp: 21 27 25 27   Temp:       TempSrc:       SpO2: 98% 98% 99% 90%       Cardiac Rhythm:  ,      Pain level: 0-10 Pain Scale: 7  Patient confused: No. Patient Falls Risk: No.   Elimination Status: Due to void.   Patient Report - Initial Complaint: SOB. Focused Assessment: Pt presents to ED with labored breathing, cough, fever, diarrhea. Pt has COPD and current some day smoker. Pt originally had extremely labored breathing with diminished lung sounds. Pt trialed on BiPap, but taken off at 2039 to trial off of it. Pt given IV SoluMedrol, IV magnesium, IV fluids, and IV antibiotics. Oxygen saturations remain low 90s on room air. ABCs intact. Pt A&OX4.   Tests Performed: Labs, blood cultures, chest xray, COVID swab. Abnormal Results:   Labs Ordered and Resulted from Time of ED Arrival Up to the Time of Departure from the ED   CBC WITH PLATELETS DIFFERENTIAL - Abnormal; Notable for the following components:       Result Value    WBC 22.7 (*)     Absolute Neutrophil 18.4 (*)     Absolute Monocytes 2.1 (*)     All other " components within normal limits   BASIC METABOLIC PANEL - Abnormal; Notable for the following components:    Sodium 127 (*)     Glucose 146 (*)     Urea Nitrogen 4 (*)     All other components within normal limits   BLOOD GAS VENOUS - Abnormal; Notable for the following components:    Ph Venous 7.44 (*)     PCO2 Venous 34 (*)     All other components within normal limits   TROPONIN I   COVID-19 VIRUS (CORONAVIRUS) BY PCR   LACTIC ACID WHOLE BLOOD   D DIMER QUANTITATIVE   NT PROBNP INPATIENT   PERIPHERAL IV CATHETER   VITAL SIGNS   PULSE OXIMETRY NURSING   CARDIAC CONTINUOUS MONITORING   BLOOD CULTURE   BLOOD CULTURE     XR Chest Port 1 View   Final Result   IMPRESSION: Negative chest.        .   Treatments provided: See MAR. BiPap - now on oxygen via NC.   Family Comments: No family here.  OBS brochure/video discussed/provided to patient:  N/A  ED Medications:   Medications   ipratropium - albuterol 0.5 mg/2.5 mg/3 mL (DUONEB) neb solution 3 mL (3 mLs Nebulization Given 9/26/20 2038)   0.9% sodium chloride BOLUS (500 mLs Intravenous New Bag 9/26/20 2016)   sodium chloride 0.9% infusion (has no administration in time range)   cefTRIAXone (ROCEPHIN) 1 g vial to attach to  mL bag for ADULTS or NS 50 mL bag for PEDS (has no administration in time range)   magnesium sulfate 2 g in water intermittent infusion (0 g Intravenous Stopped 9/26/20 2031)   methylPREDNISolone sodium succinate (solu-MEDROL) injection 125 mg (125 mg Intravenous Given 9/26/20 1931)     Drips infusing:  No  For the majority of the shift, the patient's behavior Green. Interventions performed were N/A.    Sepsis treatment initiated: No     Patient tested for COVID 19 prior to admission: YES    ED Nurse Name/Phone Number: Irina Yuen RN,   8:52 PM    RECEIVING UNIT ED HANDOFF REVIEW    Above ED Nurse Handoff Report was reviewed: Yes  Reviewed by: Bo Weldon RN on September 26, 2020 at 9:38 PM

## 2020-09-27 NOTE — ED NOTES
Patient off BiPap and being given nebulizers. HR improved to 115 and patient's breathing less labored.

## 2020-09-27 NOTE — PLAN OF CARE
/67 (BP Location: Left arm)   Pulse 96   Temp 97.8  F (36.6  C) (Oral)   Resp 20   Wt 44.9 kg (99 lb)   SpO2 96%   BMI 16.47 kg/m       A0x4. Airborne precautions maintained.   COVID results pending. Wheezing, continues on 02 per NC,2L non productive coughing.   States improved SOB,  Afebrile 97.8. Solumedrol IV. q8 hrs. Respiratory following, NS infusing at 100 ML/Hr. Melatonin given per request. IV Rocephin q24 hrs.  Continue POC.

## 2020-09-27 NOTE — PROGRESS NOTES
Contacted by RN that patients Covid testing initially negative.  She is a high risk PUI given history.  I will leave her on precautions today and repeat an additional covid test later today.  Discussed with RN.

## 2020-09-27 NOTE — H&P
[facility]  History and Physical  Hospitalist       Date of Admission:  9/26/2020    Chief Complaint   Shortness of breath    History is obtained from the patient.    History of Present Illness   Bailee Dixon is a 60 year old female with past medical history of COPD, NSTEMI who presents with chief complaint of shortness of breath.  She states that for the past approximately 2 weeks she has had progressively worsening shortness of breath with a productive cough with white/yellow sputum.  She also reports a fever of 101 today.  She does admit to some nasal congestion and intermittent sore throat.  She denies any sick contacts.  She does note that her daughter and son recently moved back in with her because they lost their jobs.  She does report that she has been using her albuterol inhaler about every 3 hours with some relief of her symptoms.  She also reports that she has been on Dulera inhaler however uses it intermittently because it is too expensive.    ASSESSMENT/PLAN    Acute Hypoxic Respiratory Failure  - secondary to COPD exacerbation/bronchitis    Acute Exacerbation of COPD  - Solumedrol 40 mg IV q8h  - Duonebs q4h  - Incentive spirometry  - Wean O2 as able    Acute Bacterial Bronchitis/Atypical Pneumonia with Sepsis  - Start Azithromycin/Rocephin - allergies to Doxy and Levaquin  - IVF 0.9% @100/hr  - Check lactic acid  - Blood cultures pending  - Pt hyponatremic, leukocytosis 22.7, and negative CXR, will check legionella antigen and mycoplasma IgM.  Has had a bad arrhythmia from Levaquin in the past.    Hyponatremia  - check BMP in AM    Coronary Artery Disease  - Pt denies taking any aspirin or cardiac meds    Nicotine Dependence in Remission  - pt states she quit smoking cigarettes awhile ago    DVT Prophylaxis: Enoxaparin (Lovenox) SQ  Code Status: Full Code  Discharge Plan: Expected discharge: 2 - 3 days, recommended to prior living arrangement once O2 use less than 2  liters/minute.      Nico Yang, DO    Primary Care Physician   Robert Singh    -----------------------------------------------------------------------------------------------------------------------------------------------------------------------------------------------------    Past Medical History    I have reviewed this patient's medical history and updated it with pertinent information if needed.   Past Medical History:   Diagnosis Date     Allergic state      Coronary artery disease      DDD (degenerative disc disease), lumbar      Hyperlipidemia      Migraine without aura, without mention of intractable migraine without mention of status migrainosus     infreq     NSTEMI (non-ST elevated myocardial infarction) (H)      Other acne     occ infected skin cysts     Smoker      Smoking 4/26/2011       Past Surgical History   I have reviewed this patient's surgical history and updated it with pertinent information if needed.  Past Surgical History:   Procedure Laterality Date     APPENDECTOMY  1990     C NONSPECIFIC PROCEDURE  1989    ALEX/BSO for endometriosis     C NONSPECIFIC PROCEDURE      T + A     C NONSPECIFIC PROCEDURE  1989    appy with ALEX       HYSTERECTOMY, PAP NO LONGER INDICATED         Prior to Admission Medications   Prior to Admission Medications   Prescriptions Last Dose Informant Patient Reported? Taking?   DULERA 100-5 MCG/ACT inhaler   No No   Sig: TAKE 2 PUFFS BY MOUTH TWICE A DAY   EPINEPHrine (EPIPEN) 0.3 MG/0.3ML injection   No No   Sig: Inject 0.3 mLs (0.3 mg) into the muscle once as needed for anaphylaxis   LORazepam (ATIVAN) 0.5 MG tablet   No No   Sig: Take 1 tablet (0.5 mg) by mouth every 8 hours as needed for anxiety   VENTOLIN  (90 Base) MCG/ACT inhaler   No No   Sig: INHALE 2 PUFFS BY MOUTH EVERY 6 HOURS AS NEEDED FOR WHEEZE OR FOR SHORTNESS OF BREATH   fluconazole (DIFLUCAN) 100 MG tablet   No No   Sig: As needed for oral thrush   guaiFENesin-codeine (ROBITUSSIN AC)  "100-10 MG/5ML solution   No No   Sig: Take 5-10 mLs by mouth every 4 hours as needed for cough   Patient not taking: Reported on 3/25/2020      Facility-Administered Medications: None     Allergies   Allergies   Allergen Reactions     Levaquin [Levofloxacin] Shortness Of Breath     Bee Venom      Doxycycline      \"swelling of hands and feet\"     Losartan      Turns red        Social History   I have reviewed this patient's social history and updated it with pertinent information if needed. Bailee Dixon  reports that she quit smoking about 8 years ago. Her smoking use included cigarettes. She has a 21.00 pack-year smoking history. She has never used smokeless tobacco. She reports current alcohol use. She reports that she does not use drugs.    Family History   I have reviewed this patient's family history and updated it with pertinent information if needed.   Family History   Problem Relation Age of Onset     Arthritis Mother         SLE     Hypertension Mother      Asthma Mother      Thyroid Disease Mother      Diabetes Maternal Grandmother        -----------------------------------------------------------------------------------------------------------------------------------------------------------------------------------------------------    Review of Systems   The 10 point Review of Systems is negative other than noted in the HPI or here.     Physical Exam   Temp: 98.6  F (37  C) Temp src: Oral BP: (!) 130/99 Pulse: 114   Resp: 27 SpO2: 93 % O2 Device: Nasal cannula Oxygen Delivery: 2 LPM  Vital Signs with Ranges  Temp:  [98.6  F (37  C)] 98.6  F (37  C)  Pulse:  [106-135] 114  Resp:  [21-34] 27  BP: (115-183)/() 130/99  SpO2:  [90 %-100 %] 93 %  0 lbs 0 oz    Constitutional: Awake, alert, cooperative, no apparent distress.  Eyes: Conjunctiva and pupils examined and normal.  HEENT: Moist mucous membranes, normal dentition.  Respiratory: Diffuse expiratory wheezes  Cardiovascular: Tachycardia, normal " S1 and S2, and no murmur noted.  GI: Soft, non-distended, non-tender, normal bowel sounds.  Lymph/Hematologic: No anterior cervical or supraclavicular adenopathy.  Skin: No rashes, no cyanosis, no edema.  Musculoskeletal: No joint swelling, erythema or tenderness.  Neurologic: Cranial nerves 2-12 intact, normal strength and sensation.  Psychiatric: Alert, oriented to person, place and time, no obvious anxiety or depression.     Data     Recent Labs   Lab 09/26/20  1915   WBC 22.7*   HGB 15.0   MCV 89      *   POTASSIUM 3.7   CHLORIDE 96   CO2 23   BUN 4*   CR 0.53   ANIONGAP 8   BHAVYA 9.2   *   TROPI <0.015       Recent Results (from the past 24 hour(s))   XR Chest Port 1 View    Narrative    EXAM: XR CHEST PORT 1 VW  LOCATION: [facility]  DATE/TIME: 9/26/2020 7:17 PM    INDICATION: Shortness of breath  COMPARISON: 01/15/2018      Impression    IMPRESSION: Negative chest.

## 2020-09-27 NOTE — PHARMACY-ADMISSION MEDICATION HISTORY
Admission medication history interview status for this patient is complete. See [facility] admission navigator for allergy information, prior to admission medications and immunization status.     Medication history interview done via telephone during Covid-19 pandemic, indicate source(s): Patient  Medication history resources (including written lists, pill bottles, clinic record):None  Pharmacy: [place], [place] (off [address])    Changes made to PTA medication list:  Added: Tylenol  Deleted: Robitussin AC (discontinued), lorazepam (discontinued)  Changed: none    Actions taken by pharmacist (provider contacted, etc):None     Additional medication history information: Bailee mentioned she has difficulty affording Dulera so she doesn't take it all the time but when she needs it.    Medication reconciliation/reorder completed by provider prior to medication history?  N    Prior to Admission medications    Medication Sig Last Dose Taking? Auth Provider   DULERA 100-5 MCG/ACT inhaler TAKE 2 PUFFS BY MOUTH TWICE A DAY 9/26/2020 at AM Yes Robert Singh MD   VENTOLIN  (90 Base) MCG/ACT inhaler INHALE 2 PUFFS BY MOUTH EVERY 6 HOURS AS NEEDED FOR WHEEZE OR FOR SHORTNESS OF BREATH 9/26/2020 at 1700 Yes Robert Singh MD   acetaminophen (TYLENOL) 325 MG tablet Take 325-650 mg by mouth every 4 hours as needed for mild pain Unknown  Reported, Patient   EPINEPHrine (EPIPEN) 0.3 MG/0.3ML injection Inject 0.3 mLs (0.3 mg) into the muscle once as needed for anaphylaxis Unknown  Robert Singh MD   fluconazole (DIFLUCAN) 100 MG tablet As needed for oral thrush Unknown  Cuong Clayton MD

## 2020-09-27 NOTE — PROGRESS NOTES
[facility]  Hospitalist Progress Note  Name: Bailee Dixon    MRN: [id number]  Physician:  Ector Adler DO, MONCHO (Text Page)    Summary of Stay:  Bailee Dixon is a 60 year old female with past medical history of COPD, NSTEMI who presents with chief complaint of shortness of breath.  She states that for the past approximately 2 weeks she has had progressively worsening shortness of breath with a productive cough with white/yellow sputum.  She also reports fevers, body aches for the past 1-2 days.  She does admit to some nasal congestion and intermittent sore throat.  She denies any sick contacts.     Assessment & Plan    Acute hypoxic respiratory failure  Acute COPD Exacerbation  Fevers of unclear source, possible COVID:  -  Albuterol and atrovent inhalers QID (Avoid nebs given possibility of Covid)  -  Solumedrol IV  -  COVID Precautions.  Despite initial negative Covid, I'm still very suspicious of Covid.  Patient has no overt bacterial infection.  I will continue ceftriaxone and azithromycin for today while awaiting cultures.  Repeat Covid testing this evening.  Continue on Covid isolation.    Tobacco use disorder:  -  Cessation encouraged this admission    Initial mild hyponatremia  Initial dehydration:  -  Sodium normalizing.  Continue IVF with fevers but will reduce rate.         COVID Status:  COVID-19 PCR Results    COVID-19 PCR Results 7/8/20 9/26/20 9/26/20 1915 1915   COVID-19 Virus PCR to [facility] - Result  Test received-See reflex to IDDL test SARS CoV2 (COVID-19) Virus RT-PCR    COVID-19 Virus PCR to [facility] - Source  Nasopharyngeal    COVID-19 Virus by PCR (External Result) Not Detected     SARS-CoV-2 Virus Specimen Source   Nasopharyngeal   SARS-CoV-2 PCR Result   NEGATIVE      Comments are available for some flowsheets but are not being displayed.         COVID-19 Antibody Results, Testing for Immunity    COVID-19 Antibody Results, Testing for Immunity   No data to  display.            Diet: Combination Diet Regular Diet Adult    DVT Prophylaxis: Enoxaparin (Lovenox) SQ  Stein Catheter: not present  Code Status: Full Code      Disposition Plan   Expected discharge in a couple days with further improvement.     Entered: Ector Adler 09/27/2020, 2:52 PM       Interval History   Assumed care, history reviewed.  Ms Dixon reports feeling better today.  Chest feels looser and she feels her breathing is much easier.  Cough mostly dry, slight prior yellow production reported.      -Data reviewed today: I reviewed all new labs and imaging reports over the last 24 hours. I personally reviewed no images or EKG's today.  Prior CXR reviewed, no infiltrate noted.    Physical Exam   Temp: 100.1  F (37.8  C) Temp src: Oral BP: (!) 147/74 Pulse: 104   Resp: 20 SpO2: 96 % O2 Device: Nasal cannula Oxygen Delivery: 2 LPM  Vitals:    09/27/20 0110   Weight: 44.9 kg (99 lb)     Vital Signs with Ranges  Temp:  [97.7  F (36.5  C)-100.1  F (37.8  C)] 100.1  F (37.8  C)  Pulse:  [] 104  Resp:  [20-34] 20  BP: (115-183)/() 147/74  SpO2:  [88 %-100 %] 96 %  No intake/output data recorded.    GEN:  Alert, oriented x 3, appears ill but comfortable, no overt distress.  HEENT:  Normocephalic/atraumatic, no scleral icterus, no nasal discharge, mouth moist.  CV:  Regular rate and rhythm, no murmur or JVD.  S1 + S2 noted, no S3 or S4.  LUNGS:  Mildly decreased breath sounds bilaterally, mild expiratory wheezing.  No crackles.  Moving air better than on prior reports.  Symmetric chest rise on inhalation noted.  ABD:  Active bowel sounds, soft, non-tender/non-distended.  No rebound/guarding/rigidity.  EXT:  No edema.  No cyanosis.  No acute joint synovitis noted.  SKIN:  Dry to touch, no exanthems noted in the visualized areas.    Medications     sodium chloride 1,000 mL (09/27/20 1146)       albuterol  2 puff Inhalation 4x Daily     azithromycin  500 mg Intravenous Q24H     cefTRIAXone  1 g  Intravenous Q24H     enoxaparin ANTICOAGULANT  40 mg Subcutaneous Q24H     methylPREDNISolone  40 mg Intravenous Q8H     sodium chloride (PF)  3 mL Intracatheter Q8H     Data     Recent Labs   Lab 09/27/20 0617 09/26/20 1915   WBC 16.8* 22.7*   HGB 13.3 15.0   HCT 39.7 43.9   MCV 91 89    334     Recent Labs   Lab 09/26/20 1948 09/26/20 1915   CULT No growth after 12 hours No growth after 17 hours     Recent Labs   Lab 09/27/20 0617 09/26/20 1915    127*   POTASSIUM 4.1 3.7   CHLORIDE 103 96   CO2 25 23   ANIONGAP 7 8   * 146*   BUN 3* 4*   CR 0.47* 0.54  0.53   GFRESTIMATED >90 >90  >90   GFRESTBLACK >90 >90  >90   BHAVYA 8.4* 9.2       Recent Results (from the past 24 hour(s))   XR Chest Port 1 View    Narrative    EXAM: XR CHEST PORT 1 VW  LOCATION: [facility]  DATE/TIME: 9/26/2020 7:17 PM    INDICATION: Shortness of breath  COMPARISON: 01/15/2018      Impression    IMPRESSION: Negative chest.

## 2020-09-27 NOTE — PROGRESS NOTES
"[facility] RCAT     Date:  20  Admission Dx:  COPD Exacerbation  Pulmonary History  COPD  Home Nebulizer/MDI Use:  Albuterol, Dulera  Home Oxygen:  N/A  Acuity Level (RCAT flow sheet):  3  Aerosol Therapy initiated:  Albuterol MDI QID and Q6 prn    Pulmonary Hygiene initiated:  Cough and deep breathe    Volume Expansion initiated:  Incentive spirometer    Current Oxygen Requirements:  2L  Current SpO2:  97%        Tobacco Use      Smoking status: Former Smoker        Packs/day: 1.00        Years: 21.00        Pack years: 21        Types: Cigarettes        Quit date: 10/17/2011        Years since quittin.9      Smokeless tobacco: Never Used      Tobacco comment: started  per pt, occasionally now       Re-evaluation date:  20    Patient Education:  Patient will be educated on respiratory therapy medications and use.      See \"RT Assessments\" flow sheet for patient assessment scoring and Acuity Level Details.    Oksana Pepper, RT   "

## 2020-09-27 NOTE — PLAN OF CARE
A&Ox4. Up independently. BP elevated. Temp maxed @ 100.1. COVID test came back negative, MD placed additional covid test later today. Airborne precautions maintained. Wheezing expiratory. Pt. Is on O2 2L, dry cough. IBRAHIM and SOB when gets up to the bathroom. On Solumedrol IV. q8 hrs. NS infusing at 100 ml/hr. IV Rocephin q24 hrs. Continue POC.

## 2020-09-28 VITALS
BODY MASS INDEX: 16.44 KG/M2 | HEART RATE: 86 BPM | RESPIRATION RATE: 20 BRPM | TEMPERATURE: 97.7 F | SYSTOLIC BLOOD PRESSURE: 144 MMHG | OXYGEN SATURATION: 94 % | DIASTOLIC BLOOD PRESSURE: 70 MMHG | WEIGHT: 98.8 LBS

## 2020-09-28 LAB
LABORATORY COMMENT REPORT: NORMAL
M PNEUMO IGM SER IA-ACNC: 0.18 U/L
SARS-COV-2 RNA SPEC QL NAA+PROBE: NEGATIVE
SPECIMEN SOURCE: NORMAL

## 2020-09-28 PROCEDURE — 40000275 ZZH STATISTIC RCP TIME EA 10 MIN

## 2020-09-28 PROCEDURE — 99239 HOSP IP/OBS DSCHRG MGMT >30: CPT | Performed by: HOSPITALIST

## 2020-09-28 PROCEDURE — 25000128 H RX IP 250 OP 636: Performed by: INTERNAL MEDICINE

## 2020-09-28 RX ORDER — AZITHROMYCIN 250 MG/1
250 TABLET, FILM COATED ORAL DAILY
Qty: 3 TABLET | Refills: 0 | Status: SHIPPED | OUTPATIENT
Start: 2020-09-28 | End: 2020-10-01

## 2020-09-28 RX ORDER — PREDNISONE 20 MG/1
TABLET ORAL
Qty: 20 TABLET | Refills: 0 | Status: SHIPPED | OUTPATIENT
Start: 2020-09-28 | End: 2020-10-09

## 2020-09-28 RX ORDER — AZITHROMYCIN 250 MG/1
250 TABLET, FILM COATED ORAL DAILY
Status: DISCONTINUED | OUTPATIENT
Start: 2020-09-28 | End: 2020-09-28

## 2020-09-28 RX ORDER — CEFUROXIME AXETIL 500 MG/1
500 TABLET ORAL 2 TIMES DAILY
Qty: 10 TABLET | Refills: 0 | Status: SHIPPED | OUTPATIENT
Start: 2020-09-28 | End: 2020-10-03

## 2020-09-28 RX ORDER — CEFTRIAXONE 2 G/1
2 INJECTION, POWDER, FOR SOLUTION INTRAMUSCULAR; INTRAVENOUS EVERY 24 HOURS
Status: DISCONTINUED | OUTPATIENT
Start: 2020-09-28 | End: 2020-09-28 | Stop reason: HOSPADM

## 2020-09-28 RX ORDER — AZITHROMYCIN 250 MG/1
250 TABLET, FILM COATED ORAL DAILY
Status: DISCONTINUED | OUTPATIENT
Start: 2020-09-28 | End: 2020-09-28 | Stop reason: HOSPADM

## 2020-09-28 RX ADMIN — IPRATROPIUM BROMIDE 2 PUFF: 17 AEROSOL, METERED RESPIRATORY (INHALATION) at 01:33

## 2020-09-28 RX ADMIN — IPRATROPIUM BROMIDE 2 PUFF: 17 AEROSOL, METERED RESPIRATORY (INHALATION) at 06:10

## 2020-09-28 RX ADMIN — ALBUTEROL SULFATE 2 PUFF: 90 AEROSOL, METERED RESPIRATORY (INHALATION) at 06:10

## 2020-09-28 RX ADMIN — METHYLPREDNISOLONE SODIUM SUCCINATE 40 MG: 40 INJECTION, POWDER, FOR SOLUTION INTRAMUSCULAR; INTRAVENOUS at 01:32

## 2020-09-28 RX ADMIN — ALBUTEROL SULFATE 2 PUFF: 90 INHALANT RESPIRATORY (INHALATION) at 01:33

## 2020-09-28 ASSESSMENT — ACTIVITIES OF DAILY LIVING (ADL)
ADLS_ACUITY_SCORE: 11

## 2020-09-28 NOTE — DISCHARGE INSTRUCTIONS
COPD Flare    You have had a flare-up of your COPD.  COPD (chronic obstructive pulmonary disease) is a common lung disease. It causes your airways to get irritated and narrower. This makes it harder for you to breathe. Emphysema and chronic bronchitis are both types of COPD. This is a long-term (chronic) condition. This means you always have it. Sometimes it gets worse. When this happens, it is called a flare-up.  Symptoms of COPD  People with COPD may have symptoms most of the time. In a flare-up, your symptoms get worse. These symptoms may mean you are having a flare-up:    Shortness of breath, shallow or rapid breathing, or wheezing that gets worse    Lung infection    Cough that gets worse    More mucus, thicker mucus or mucus of a different color    Tiredness, less energy, or trouble doing your normal activities    Fever    Chest tightness    Your symptoms don t get better even when you use your normal medicines, inhalers, and nebulizer    Trouble talking    You feel confused  Causes of flare-ups  Unfortunately, a flare-up can happen even if you did everything right. And even if you followed your healthcare provider s instructions. Some causes of flare-ups are:    Smoking or secondhand smoke    Colds, the flu, or respiratory infections    Air pollution    Sudden change in the weather    Dust, irritating chemicals, or strong fumes    Not taking your medicines as prescribed  Home care  Here are some things you can do at home to treat a flare-up:    Try not to panic. This makes it harder to breathe, and keeps you from doing the right things.    Don t smoke or be around others who are smoking.    Try to drink more fluids than normal during a flare-up, unless your healthcare provider has told you not to because of heart and kidney problems. More fluids can help loosen the mucus.    Use your inhalers and nebulizer, if you have one, as you have been told to.    If you were given antibiotics, take them until they are  used up or your provider tells you to stop. It s important to finish the antibiotics, even though you feel better. This will make sure the infection has cleared.    If you were given prednisone or another steroid, finish it even if you feel better.  Preventing a flare-up  Flare-ups happen. But the best way to treat one is to prevent it before it starts. Here are some pointers:    Don t smoke or be around others who are smoking.    Take your medicines as discussed with your healthcare provider.    Talk with your provider about getting a flu shot every year. Also find out if you need a pneumonia shot.    If there is a weather advisory warning to stay indoors, try to stay inside when possible.    Try to eat healthy, exercise, and get plenty of sleep.    Try to stay away from things that normally set you off. These include dust, chemical fumes, hairsprays, or strong perfumes.  Follow-up care  Follow up with your healthcare provider, or as advised.  If a culture was done, you will be told if your treatment needs to be changed. You can call as directed for the results.  If X-rays were done, you will be told of any new findings that may affect your care.  Call 911  Call 911 if any of these occur:    You have trouble breathing    You feel confused or it s hard to wake you up    You faint or lose consciousness    You have a rapid heart rate    You have new pain in your chest, arm, shoulder, neck, or upper back  When to seek medical advice  Call your healthcare provider right away if any of these occur:    Wheezing or shortness of breath gets worse    You need to use your inhalers more often than normal without relief    Fever of 100.4 F (38 C) or higher, or as directed by your healthcare provider    Coughing up lots of dark-colored or bloody mucus (sputum)    Chest pain with each breath    You don't start to get better within 24 hours    Swelling of your ankles gets worse    Dizziness or weakness  Date Last Reviewed:  9/1/2016 2000-2019 [employer]. [address] [place] [address], [place] [address]. All rights reserved. This information is not intended as a substitute for professional medical care. Always follow your healthcare professional's instructions.

## 2020-09-28 NOTE — PROGRESS NOTES
VSS. Pt alert and oriented x 4. Up independently in room. On airborne precautions. Afebrile. Off and on 2L oxygen. Wheezing noted in lungs, improved after inhaler tx. PIV SL per MD. Tolerating diet, good appetite. PRN ativan given at HS. Rocephin and zithro for IV abx. Denies pain. IS encouraged. Discharge pending.

## 2020-09-28 NOTE — PLAN OF CARE
A&O x 4, independent, regular diet, LS expiratory wheezes, inhalers given, O2 94% 2L, covid results pending, airborne precautions taken, sticky note in place regarding IV fluids, will continue with POC.

## 2020-09-28 NOTE — PLAN OF CARE
Discharge instructions reviewed with Patient who verbalized understanding on new meds and discharge instructions.

## 2020-09-28 NOTE — DISCHARGE SUMMARY
"Hospitalist Discharge Summary  [facility]    Bailee Dixon MRN# [id number]   YOB: 1960 Age: 60 year old     Date of Admission:  9/26/2020  Date of Discharge:  9/28/2020  Admitting Physician:  Nico Yang DO  Discharge Physician:  Mario Yang DO  Discharging Service:  Hospitalist     Primary Provider: Robert Singh          Discharge Diagnosis:   1.  Acute hypoxic respiratory failure.   2.  Acute chronic obstructive pulmonary disease exacerbation.   3.  Febrile illness, suspect community-acquired pneumonia versus bacterial bronchitis.   4.  Tobacco use disorder.   5.  Mild hyponatremia.   6.  Dehydration.              Discharge Disposition:   Discharged to home           Allergies:   Allergies   Allergen Reactions     Levaquin [Levofloxacin] Shortness Of Breath     Bee Venom      Doxycycline      \"swelling of hands and feet\"     Losartan      Turns red               Discharge Medications:   Current Discharge Medication List      START taking these medications    Details   azithromycin (ZITHROMAX) 250 MG tablet Take 1 tablet (250 mg) by mouth daily for 3 days  Qty: 3 tablet, Refills: 0    Associated Diagnoses: COPD exacerbation (H)      cefuroxime (CEFTIN) 500 MG tablet Take 1 tablet (500 mg) by mouth 2 times daily for 5 days  Qty: 10 tablet, Refills: 0    Associated Diagnoses: COPD exacerbation (H)      predniSONE (DELTASONE) 20 MG tablet Take 3 tabs by mouth daily x 3 days, then 2 tabs daily x 3 days, then 1 tab daily x 3 days, then 1/2 tab daily x 3 days.  Qty: 20 tablet, Refills: 0    Associated Diagnoses: COPD exacerbation (H)         CONTINUE these medications which have NOT CHANGED    Details   DULERA 100-5 MCG/ACT inhaler TAKE 2 PUFFS BY MOUTH TWICE A DAY  Qty: 13 g, Refills: 5    Associated Diagnoses: Acute bronchospasm      VENTOLIN  (90 Base) MCG/ACT inhaler INHALE 2 PUFFS BY MOUTH EVERY 6 HOURS AS NEEDED FOR WHEEZE OR FOR SHORTNESS OF " BREATH  Qty: 18 Inhaler, Refills: 11    Associated Diagnoses: Pneumonia of left lower lobe due to infectious organism      acetaminophen (TYLENOL) 325 MG tablet Take 325-650 mg by mouth every 4 hours as needed for mild pain      EPINEPHrine (EPIPEN) 0.3 MG/0.3ML injection Inject 0.3 mLs (0.3 mg) into the muscle once as needed for anaphylaxis  Qty: 1 each, Refills: 1    Associated Diagnoses: Bee sting allergy      fluconazole (DIFLUCAN) 100 MG tablet As needed for oral thrush  Qty: 10 tablet, Refills: 0    Associated Diagnoses: Oral thrush                    Condition on Discharge:   Discharge condition: Stable   Discharge vitals: Blood pressure (!) 144/70, pulse 86, temperature 97.7  F (36.5  C), temperature source Oral, resp. rate 20, weight 44.8 kg (98 lb 12.8 oz), SpO2 94 %, not currently breastfeeding.   Code status on discharge: Full Code      BASIC PHYSICAL EXAMINATION:  GENERAL: No apparent distress.  CARDIOVASCULAR: Regular rate and rhythm without murmurs.  PULMONARY: Clear to auscultation bilaterally.   GASTROINTESTINAL: Abdomen soft, non-tender.  EXTREMITIES: No edema, pulses intact.  NEUROLOGIC: No focal deficits.            History of Illness:   See detailed admission note for full details.               Procedures excluding imaging which is summarized below:   Please see details in the electronic medical record.           Consultations:   None          Significant Results:   Results for orders placed or performed during the hospital encounter of 09/26/20   XR Chest Port 1 View    Narrative    EXAM: XR CHEST PORT 1 VW  LOCATION: [facility]  DATE/TIME: 9/26/2020 7:17 PM    INDICATION: Shortness of breath  COMPARISON: 01/15/2018      Impression    IMPRESSION: Negative chest.       Transthoracic Echocardiogram Results:  No results found for this or any previous visit (from the past 4320 hour(s)).             Pending Results:   Unresulted Labs Ordered in the Past 30 Days of this Admission      Date and Time Order Name Status Description    9/27/2020 1940 SARS-CoV-2 COVID-19 Virus (Coronavirus) RT-PCR In process     9/27/2020 0500 Mycoplasma pneumoniae maren Igm In process     9/26/2020 1922 Blood culture Preliminary     9/26/2020 1922 Blood culture Preliminary                       Discharge Instructions and Follow-Up:   Discharge instructions and follow-up:   Discharge Procedure Orders   Follow-up and recommended labs and tests    Order Comments: Follow up with primary care provider, Robert Singh, within 7 days to evaluate medication change and for hospital follow- up.  No follow up labs or test are needed.     Activity   Order Comments: Your activity upon discharge: activity as tolerated     Order Specific Question Answer Comments   Is discharge order? Yes      Full Code     Order Specific Question Answer Comments   Code status determined by: Discussion with patient/ legal decision maker      Diet   Order Comments: Follow this diet upon discharge: Orders Placed This Encounter      Combination Diet Regular Diet Adult     Order Specific Question Answer Comments   Is discharge order? Yes              Hospital Course:   This is a 60-year-old female with a medical history of COPD, non-STEMI, and tobacco dependence, presenting on 09/26 with shortness of breath.  This has been ongoing for about 2 weeks and progressively worsening.  She noted a productive cough with yellow and white sputum as well.  She also reported some fevers up to 101.  She was initially hypoxic, requiring some supplemental oxygen.  She had bronchospastic lung sounds, so this was consistent with reactive airway disease and COPD exacerbation.  She was started on IV Solu-Medrol and scheduled DuoNebs.  There was some concern for possible COVID-19 infection, despite negative chest x-ray and neutrophil predominant leukocytosis.  COVID-19 PCR returned negative.  A second one was sent last night, but overall had fairly low suspicion for active  COVID-19 infection, and universal isolation precaution measures were implemented during the hospitalization.  She was started empirically on azithromycin and ceftriaxone.  SHE DOES HAVE ALLERGIES TO DOXYCYCLINE AND LEVAQUIN.  She feels dramatically better at this time and has been weaned off of oxygen successfully.  She appears suitable for discharge home with outpatient prednisone taper, as well as empiric Ceftin and azithromycin.  We will call her if her COVID-19 swab returns positive, but she plans to quarantine for the next 24-48 hours while awaiting for this test result.  I suggested that she follow up with her primary care physician next week.     The patient was seen, examined, and counseled on this day. The hospitalization and plan of care was reviewed with the patient extensively. All questions were addressed and the patient agreed to follow-up as noted above.      Total time spent in face to face contact with the patient and coordinating discharge was:  35 Minutes    Mario Yang DO, MPH  [facility] Hospitalist  [address] [place] [address]  Pager: [phone]  2020      D: 2020   T: 2020   MT: [id number]      Name:     JOHN CORONA   MRN:      [id number]        Account:        [id number]   :      1960           Admit Date:     2020                                  Discharge Date:       Document: [id number]       cc: Robert Singh MD

## 2020-09-29 ENCOUNTER — PATIENT OUTREACH (OUTPATIENT)
Dept: CARE COORDINATION | Facility: CLINIC | Age: 60
End: 2020-09-29

## 2020-09-29 NOTE — PROGRESS NOTES
Clinic Care Coordination Contact  Community Health Worker Initial Outreach      Patient accepts CC: No, right now, I don't think I need anything, but if I could call you if I think of something or am intersted that would be great. Patient will be sent Care Coordination introduction letter for future reference.   Patient agreeable to receiving Introduction letter. She thanked me for the call.

## 2020-09-29 NOTE — LETTER
[facility] CARE COORDINATION  [address]  [facility] [address]    September 29, 2020    Bailee Dixon  [address] [facility] [address]      Dear Bailee,    I am a clinic community health worker who works with Robert Singh MD at [facility]. I wanted to thank you for spending the time to talk with me.  Below is a description of clinic care coordination and how I can further assist you.      The clinic care coordination team is made up of a registered nurse,  and community health worker who understand the health care system. The goal of clinic care coordination is to help you manage your health and improve access to the health care system in the most efficient manner. The team can assist you in meeting your health care goals by providing education, coordinating services, strengthening the communication among your providers and supporting you with any resource needs.    Please feel free to contact the Community Health Worker at [phone] with any questions or concerns. We are focused on providing you with the highest-quality healthcare experience possible and that all starts with you.     Sincerely,     [place]

## 2020-10-02 LAB
BACTERIA SPEC CULT: NO GROWTH
SPECIMEN SOURCE: NORMAL

## 2020-10-03 LAB
BACTERIA SPEC CULT: NO GROWTH
SPECIMEN SOURCE: NORMAL

## 2020-10-08 ENCOUNTER — APPOINTMENT (OUTPATIENT)
Dept: ULTRASOUND IMAGING | Facility: CLINIC | Age: 60
End: 2020-10-08
Attending: PHYSICIAN ASSISTANT
Payer: COMMERCIAL

## 2020-10-08 ENCOUNTER — MYC MEDICAL ADVICE (OUTPATIENT)
Dept: INTERNAL MEDICINE | Facility: CLINIC | Age: 60
End: 2020-10-08

## 2020-10-08 ENCOUNTER — NURSE TRIAGE (OUTPATIENT)
Dept: INTERNAL MEDICINE | Facility: CLINIC | Age: 60
End: 2020-10-08

## 2020-10-08 ENCOUNTER — APPOINTMENT (OUTPATIENT)
Dept: CT IMAGING | Facility: CLINIC | Age: 60
End: 2020-10-08
Attending: EMERGENCY MEDICINE
Payer: COMMERCIAL

## 2020-10-08 ENCOUNTER — HOSPITAL ENCOUNTER (EMERGENCY)
Facility: CLINIC | Age: 60
Discharge: HOME | End: 2020-10-08
Attending: PHYSICIAN ASSISTANT | Admitting: PHYSICIAN ASSISTANT
Payer: COMMERCIAL

## 2020-10-08 VITALS
HEART RATE: 88 BPM | HEIGHT: 64 IN | RESPIRATION RATE: 20 BRPM | SYSTOLIC BLOOD PRESSURE: 132 MMHG | BODY MASS INDEX: 17.07 KG/M2 | DIASTOLIC BLOOD PRESSURE: 89 MMHG | OXYGEN SATURATION: 97 % | WEIGHT: 100 LBS | TEMPERATURE: 98.5 F

## 2020-10-08 DIAGNOSIS — I74.5 ILIAC ARTERY OCCLUSION (H): ICD-10-CM

## 2020-10-08 DIAGNOSIS — B37.0 ORAL THRUSH: ICD-10-CM

## 2020-10-08 DIAGNOSIS — I73.9 PERIPHERAL ARTERIAL DISEASE (H): ICD-10-CM

## 2020-10-08 LAB
ANION GAP SERPL CALCULATED.3IONS-SCNC: 5 MMOL/L (ref 3–14)
BASOPHILS # BLD AUTO: 0 10E9/L (ref 0–0.2)
BASOPHILS NFR BLD AUTO: 0.2 %
BUN SERPL-MCNC: 7 MG/DL (ref 7–30)
CALCIUM SERPL-MCNC: 8.3 MG/DL (ref 8.5–10.1)
CHLORIDE SERPL-SCNC: 100 MMOL/L (ref 94–109)
CO2 SERPL-SCNC: 26 MMOL/L (ref 20–32)
CREAT SERPL-MCNC: 0.51 MG/DL (ref 0.52–1.04)
DIFFERENTIAL METHOD BLD: ABNORMAL
EOSINOPHIL # BLD AUTO: 0.1 10E9/L (ref 0–0.7)
EOSINOPHIL NFR BLD AUTO: 0.5 %
ERYTHROCYTE [DISTWIDTH] IN BLOOD BY AUTOMATED COUNT: 12.2 % (ref 10–15)
GFR SERPL CREATININE-BSD FRML MDRD: >90 ML/MIN/{1.73_M2}
GLUCOSE SERPL-MCNC: 169 MG/DL (ref 70–99)
HCT VFR BLD AUTO: 44.2 % (ref 35–47)
HGB BLD-MCNC: 14.6 G/DL (ref 11.7–15.7)
IMM GRANULOCYTES # BLD: 0.1 10E9/L (ref 0–0.4)
IMM GRANULOCYTES NFR BLD: 0.7 %
INR PPP: 0.93 (ref 0.86–1.14)
LABORATORY COMMENT REPORT: NORMAL
LYMPHOCYTES # BLD AUTO: 2.1 10E9/L (ref 0.8–5.3)
LYMPHOCYTES NFR BLD AUTO: 10.7 %
MCH RBC QN AUTO: 30.5 PG (ref 26.5–33)
MCHC RBC AUTO-ENTMCNC: 33 G/DL (ref 31.5–36.5)
MCV RBC AUTO: 93 FL (ref 78–100)
MONOCYTES # BLD AUTO: 0.6 10E9/L (ref 0–1.3)
MONOCYTES NFR BLD AUTO: 2.9 %
NEUTROPHILS # BLD AUTO: 16.7 10E9/L (ref 1.6–8.3)
NEUTROPHILS NFR BLD AUTO: 85 %
NRBC # BLD AUTO: 0 10*3/UL
NRBC BLD AUTO-RTO: 0 /100
PLATELET # BLD AUTO: 401 10E9/L (ref 150–450)
POTASSIUM SERPL-SCNC: 4.2 MMOL/L (ref 3.4–5.3)
RBC # BLD AUTO: 4.78 10E12/L (ref 3.8–5.2)
SARS-COV-2 RNA SPEC QL NAA+PROBE: NEGATIVE
SARS-COV-2 RNA SPEC QL NAA+PROBE: NORMAL
SODIUM SERPL-SCNC: 131 MMOL/L (ref 133–144)
SPECIMEN SOURCE: NORMAL
SPECIMEN SOURCE: NORMAL
WBC # BLD AUTO: 19.6 10E9/L (ref 4–11)

## 2020-10-08 PROCEDURE — 80048 BASIC METABOLIC PNL TOTAL CA: CPT | Performed by: PHYSICIAN ASSISTANT

## 2020-10-08 PROCEDURE — 250N000009 HC RX 250: Performed by: PHYSICIAN ASSISTANT

## 2020-10-08 PROCEDURE — 96365 THER/PROPH/DIAG IV INF INIT: CPT | Mod: 59

## 2020-10-08 PROCEDURE — 85610 PROTHROMBIN TIME: CPT | Performed by: PHYSICIAN ASSISTANT

## 2020-10-08 PROCEDURE — U0003 INFECTIOUS AGENT DETECTION BY NUCLEIC ACID (DNA OR RNA); SEVERE ACUTE RESPIRATORY SYNDROME CORONAVIRUS 2 (SARS-COV-2) (CORONAVIRUS DISEASE [COVID-19]), AMPLIFIED PROBE TECHNIQUE, MAKING USE OF HIGH THROUGHPUT TECHNOLOGIES AS DESCRIBED BY CMS-2020-01-R: HCPCS | Performed by: EMERGENCY MEDICINE

## 2020-10-08 PROCEDURE — C9803 HOPD COVID-19 SPEC COLLECT: HCPCS

## 2020-10-08 PROCEDURE — 93926 LOWER EXTREMITY STUDY: CPT | Mod: RT

## 2020-10-08 PROCEDURE — 99285 EMERGENCY DEPT VISIT HI MDM: CPT | Mod: 25

## 2020-10-08 PROCEDURE — 93971 EXTREMITY STUDY: CPT | Mod: RT

## 2020-10-08 PROCEDURE — 93005 ELECTROCARDIOGRAM TRACING: CPT

## 2020-10-08 PROCEDURE — 75635 CT ANGIO ABDOMINAL ARTERIES: CPT

## 2020-10-08 PROCEDURE — 85025 COMPLETE CBC W/AUTO DIFF WBC: CPT | Performed by: PHYSICIAN ASSISTANT

## 2020-10-08 PROCEDURE — 250N000011 HC RX IP 250 OP 636: Performed by: PHYSICIAN ASSISTANT

## 2020-10-08 PROCEDURE — 96366 THER/PROPH/DIAG IV INF ADDON: CPT

## 2020-10-08 RX ORDER — IOPAMIDOL 755 MG/ML
500 INJECTION, SOLUTION INTRAVASCULAR ONCE
Status: COMPLETED | OUTPATIENT
Start: 2020-10-08 | End: 2020-10-08

## 2020-10-08 RX ORDER — FLUCONAZOLE 100 MG/1
TABLET ORAL
Qty: 10 TABLET | Refills: 0 | Status: CANCELLED | OUTPATIENT
Start: 2020-10-08

## 2020-10-08 RX ORDER — HEPARIN SODIUM 10000 [USP'U]/100ML
18 INJECTION, SOLUTION INTRAVENOUS CONTINUOUS
Status: DISCONTINUED | OUTPATIENT
Start: 2020-10-08 | End: 2020-10-08 | Stop reason: HOSPADM

## 2020-10-08 RX ADMIN — HEPARIN SODIUM 18 UNITS/KG/HR: 10000 INJECTION, SOLUTION INTRAVENOUS at 16:14

## 2020-10-08 RX ADMIN — Medication 3750 UNITS: at 16:14

## 2020-10-08 RX ADMIN — SODIUM CHLORIDE 81 ML: 9 INJECTION, SOLUTION INTRAVENOUS at 16:52

## 2020-10-08 RX ADMIN — IOPAMIDOL 100 ML: 755 INJECTION, SOLUTION INTRAVENOUS at 16:52

## 2020-10-08 ASSESSMENT — ENCOUNTER SYMPTOMS
SHORTNESS OF BREATH: 0
COUGH: 1
MYALGIAS: 1

## 2020-10-08 ASSESSMENT — MIFFLIN-ST. JEOR: SCORE: 1013.6

## 2020-10-08 NOTE — ED TRIAGE NOTES
Right leg pain and swelling x 2 days. Patient sent from clinic for ultrasound to rule out DVT. ABC intact alert and no distress.

## 2020-10-08 NOTE — ED AVS SNAPSHOT
[facility] Emergency Dept  [address]  [facility] [address]  Phone: [phone]  Fax: [phone]                                    Bailee Dixon   MRN: [id number]    Department: [facility] Emergency Dept   Date of Visit: 10/8/2020           After Visit Summary Signature Page    I have received my discharge instructions, and my questions have been answered. I have discussed any challenges I see with this plan with the nurse or doctor.    ..........................................................................................................................................  Patient/Patient Representative Signature      ..........................................................................................................................................  Patient Representative Print Name and Relationship to Patient    ..................................................               ................................................  Date                                   Time    ..........................................................................................................................................  Reviewed by Signature/Title    ...................................................              ..............................................  Date                                               Time          22EPIC Rev 08/18

## 2020-10-08 NOTE — TELEPHONE ENCOUNTER
Attempted to contact pt via telephone. She states she cannot talk right now. She will call back.     Please triage. May need to open new encounter to use Triage protocol.

## 2020-10-08 NOTE — TELEPHONE ENCOUNTER
Pt calls back. It started 2 days ago. In the am, woke up and her Right Ankle started hurting. Hurts to walk.   The vein in the back is swollen and has a purple bruise area on the side of leg. About the size of half dollar.     She is using ice.   Was in hospital for pneumonia. Was on antibiotics and prednisone.   Advised to go to ED. She agrees with this plan.   Call to ED and advise them as well.     Additional Information    Negative: Sounds like a life-threatening emergency to the triager    Negative: Chest pain    Negative: Small area of swelling and followed an insect bite to the area    Negative: Followed a knee injury    Negative: Ankle or foot injury    Negative: Pregnant with leg swelling or edema    Negative: Difficulty breathing at rest    Negative: Entire foot is cool or blue in comparison to other side    SEVERE swelling (e.g., swelling extends above knee, entire leg is swollen, weeping fluid)    Protocols used: LEG SWELLING AND EDEMA-A-OH

## 2020-10-08 NOTE — ED NOTES
Emergency Department Attending Supervision Note  10/8/2020  4:05 PM      I evaluated this patient in conjunction with Sadie Avendano PA, please see primary note for full details      Briefly, the patient presented with  Increasing mod to severe RLE calf pain, nonradiating, particularly worse over the last 2 days, no hx of trauma      On my exam, unable to palpate R DP, R foot cooler to touch than left, distal motor and sensation intact, nontox appearance, nml resp effort    Results:  Labs Ordered and Resulted from Time of ED Arrival Up to the Time of Departure from the ED   BASIC METABOLIC PANEL - Abnormal; Notable for the following components:       Result Value    Sodium 131 (*)     Glucose 169 (*)     Creatinine 0.51 (*)     Calcium 8.3 (*)     All other components within normal limits   CBC WITH PLATELETS DIFFERENTIAL - Abnormal; Notable for the following components:    WBC 19.6 (*)     Absolute Neutrophil 16.7 (*)     All other components within normal limits   COVID-19 VIRUS (CORONAVIRUS) BY PCR   INR   MEASURE WEIGHT   NOTIFY PHYSICIAN   NOTIFY PHYSICIAN        CTA Abdomen Pelvis Bilat Leg Runoff w Contr   Final Result   IMPRESSION:   1.  Severe focal infrarenal abdominal aortic stenosis secondary to large calcified plaque.   2.  Occlusion of the proximal left common iliac artery; left external iliac artery reconstituted, possibly via internal iliac artery branches.   3.  Severe right common iliac artery stenosis, possibly subtotal stenosis with small caliber but patent right external iliac artery.   4.  Single vessel runoff to the feet bilaterally via posterior tibial artery. Diminutive and poorly opacified peroneal and anterior tibial arteries bilaterally.      US Lower Extremity Arterial Duplex Right   Final Result   IMPRESSION: Findings would indicate a significant stenosis and/or   occlusion involving the right iliac arterial system. Further   evaluation with a CT angiogram could be performed.       BERTHA ALBA MD      US Lower Extremity Venous Duplex Right   Final Result   IMPRESSION: No evidence of deep venous thrombosis.      BERTHA ALBA MD        ED course:    60 y.o. F presenting w/ RLE pain, R DP pulse undopplerable.  Arterial US demonstrated R iliac stenosis vs occlusion.  Heparin infusion started in interim while awaiting further CT results.  CT abd pelvis w/ runoff w/ results as noted above demonstrating proximal occlusion w/ reconstitution and single vessel runoff to feet.  PA discussed pt with vascular surgery who recommended admission for pain control vs outpatient f/u which pt was comfortable with.  Pt discharged in stable condition w/ recs given regarding f/u in clinc and return to the ED.        Diagnosis    ICD-10-CM    1. Iliac artery occlusion (H)  I74.5 Basic metabolic panel     CBC + differential     Asymptomatic COVID-19 Virus (Coronavirus) by PCR     INR     SARS-CoV-2 COVID-19 Virus (Coronavirus) RT-PCR     SARS-CoV-2 COVID-19 Virus (Coronavirus) RT-PCR Nasopharyngeal   2. Peripheral arterial disease (H)  I73.9          MD Ta Dudley Christopher E, MD  10/11/20 3814

## 2020-10-08 NOTE — ED PROVIDER NOTES
"  History     Chief Complaint:  Leg Pain and Leg Swelling      The history is provided by the patient.      Bailee Dixon is a 60 year old female with a history of CAD and NSTEMI who presents with right leg pain and swelling that began yesterday morning. The patient was sent here from clinic for further evaluation with ultrasound to rule out DVT. Her pain begins in her right ankle and travels up her leg, which is very tender to the touch. She has been taking ibuprofen to help manage her symptoms. She reports no known trauma or injury that may have triggered her symptoms. She states that her toes \"feel weird\" but denies any pain. The patient recently had pneumonia and does endorse residual cough, though this is improving. She denies any chest pain or shortness of breath. She further denies any recent surgery or personal history of blood clots. The patient is not taking blood thinners, birth control, or estrogen supplements.    After further discussion, patient admits that she has had some warner horses in the evenings for a few months. She states this gets better when walking around and is worse at nighttime. She has never been diagnosed with PAD.     Allergies:  Levofloxacin  Doxycycline  Losartan  Bupropion    Medications:    Dulera inhaler  Prednisone  Fluconazole    Past Medical History:    Chronic bronchitis  COPD exacerbation  CAD  Anxiety  NSTEMI  Acute respiratory failure  Chronic low back pain  Migraines without aura  Hyperlipdemia    Past Surgical History:    Appendectomy  ALEX  BSO  Tonsillectomy  Adenoidectomy    Family History:    Mother - arthritis, hypertension, asthma, thyroid disease    Social History:  The patient was not accompanied to the ED.  Smoking Status: Former smoker - quit 10/17/2011  Smokeless Tobacco: Never used  Alcohol Use: Yes  Drug Use: No  Marital Status:      Review of Systems   Respiratory: Positive for cough (from pneumonia, improving). Negative for shortness of " "breath.    Cardiovascular: Positive for leg swelling (R leg). Negative for chest pain.   Musculoskeletal: Positive for myalgias (R ankle going up lower leg, no toe pain).   All other systems reviewed and are negative.    Physical Exam     Patient Vitals for the past 24 hrs:   BP Temp Temp src Pulse Resp SpO2 Height Weight   10/08/20 1630 -- -- -- -- -- 97 % -- --   10/08/20 1615 -- -- -- -- -- 99 % -- --   10/08/20 1406 (!) 145/85 98.5  F (36.9  C) Oral 110 20 100 % 1.626 m (5' 4\") 45.4 kg (100 lb)     Physical Exam  General: Alert and interactive. Appears well.   Head: Atraumatic, without obvious lesion, abrasion, hematoma.   Eyes: The pupils are equal and round. No scleral icterus.   ENT: No obvious abnormalities to the ears or nose. Mucous membranes moist.   Neck: Trachea is in the midline. No obvious swelling to the neck. Full range of motion.   CV: Regular rate. Extremities well perfused. Capillary refill brisk in toes; toes are cold bilaterally.   There is no palpable DP pulse to the RLE, but there is a palpable and dopplered posterior tibialis pulse.   Resp: Non-labored, no retractions or accessory muscle use.     GI: Abdomen is not distended.   MS: Moving all extremities well. There is tenderness over the lateral aspect of the right foot, near MTs. There is a tenderness to posterior Achilles tendon.   Skin: Some ecchymosis to the lateral aspect of the foot.   Neuro: Alert and oriented x 3. Non-focal examination.    Psych: Awake. Alert.  Normal affect. Appropriate interactions.    Emergency Department Course   EKG  Indication: arterial occlusion  Time: 16:24:32  Rate 96 bpm. VA interval 98. QRS duration 68. QT/QTc 336/424. P-R-T axes 58 77 60  Sinus rhythm with short VA. Otherwise normal ECG.  Interpreted at 1626 by Dr. Siddharth Chappell MD.    Imaging:  Radiology findings were communicated with the patient who voiced understanding of the findings.    US Lower Extremity Venous Duplex Right:  No evidence of " deep venous thrombosis.  Report per radiology.    US Lower Extremity Arterial Duplex Right:  Findings would indicate a significant stenosis and/or  occlusion involving the right iliac arterial system. Further  evaluation with a CT angiogram could be performed.  Report per radiology.    CTA Abdomen Pelvis Bilat Leg Runoff w Contr:  Pending.   Report per radiology.    Laboratory:  Laboratory findings were communicated with the patient who voiced understanding of the findings.    CBC: WBC 19.6 (H), HGB 14.6,   BMP: Sodium 131 (L), Glucose 169 (H), Creatinine 0.51 (L), Calcium 8.3 (L), o/w WNL    INR: 0.93    Heparin 10a Level: Pending     COVID-19 Virus (Coronavirus) by Nasopharyngeal (NP) Swab: Pending    Procedures  None.    Interventions:  1614 Heparin 3,750 units IV  1614 Heparin 25,000 units IV Bolus    Emergency Department Course:  Past medical records, nursing notes, and vitals reviewed.    1419 I performed an exam of the patient as documented above.     The patient was sent for arterial and venous ultrasounds of the right lower extremity while in the emergency department, results above.     1600 I rechecked the patient and discussed the results of her workup thus far.     1606 I spoke with Dr. Chappell who agreed to staff the patient with me due to plan for admission.     1645 I spoke with Dr. Aury Mazariegos of the vascular medicine service from [facility] regarding patient's presentation, findings, and plan of care.    The patient was swabbed for COVID-19, noted above.  1715 I rechecked the patient and discussed the results of her workup thus far.     I signed out patient to my partner, SHAYNA Woods.     I personally reviewed the laboratory and imaging results with the Patient and answered all related questions prior to admission.     Impression & Plan     Covid-19  Bailee Dixon was evaluated during a global COVID-19 pandemic, which necessitated consideration that the patient might be at risk for  infection with the SARS-CoV-2 virus that causes COVID-19.   Applicable protocols for evaluation were followed during the patient's care.   COVID-19 was considered as part of the patient's evaluation. A test was obtained during this visit prior to the patient's admission.    Medical Decision Making:  Bailee Dixon is a 60 year old female with a history of coronary artery disease and recent hospitalization for pneumonia/COPD exacerbation who presents for evaluation of right lower extremity pain. Patient has had pain in her right lower extremity for the past two mornings; she was sent over here via primary care to rule out a DVT. The patient states that she has pain along the lateral aspect of the foot extending along her Achilles tendon and up her calf. On my exam, I was unable to Doppler DP pulsations to the right lower extremity, and therefore, I obtained an urgent arterial ultrasound. This showed possible occlusion of the iliac artery system. At this point, I started heparin, consulted vascular surgery, and ordered a CTA with runoff bilaterally. EKG shows no arrhythmias. CBC shows leukocytosis, which is non-specific, but could be due to recent steroid burst. She has no infectious symptoms. Discussed case with Dr. Mazariegos, vascular surgery, who would like to wait for CTA results prior to disposition. In further discussion, it seems that the patient likely has had some claudication with intermittent calf pains over the past few months. This argues for a subacute or chronic picture. Will sign out to SHAYNA Woods and Dr. Chappell, pending CT results, vascular consult, and possible transfer to [facility]. She is being heparinized currently and has already been swabbed for COVID in anticipation of possible surgical intervention.     Diagnosis:    ICD-10-CM    1. Iliac artery occlusion (H)  I74.5 Basic metabolic panel     CBC + differential     Asymptomatic COVID-19 Virus (Coronavirus) by PCR     INR   2. Peripheral  arterial disease (H)  I73.9        Disposition:  Signed out to SHAYNA Woods      Scribe Disclosure:  I, Maile Manisha, am serving as a scribe at 2:19 PM on 10/8/2020 to document services personally performed by Sadie Avendano PA-C based on my observations and the provider's statements to me.     Maile Dyer  10/8/2020   [facility] EMERGENCY DEPT       Sadie Avendano PA-C  10/08/20 1806

## 2020-10-09 ENCOUNTER — OFFICE VISIT (OUTPATIENT)
Dept: INTERNAL MEDICINE | Facility: CLINIC | Age: 60
End: 2020-10-09
Payer: COMMERCIAL

## 2020-10-09 ENCOUNTER — TELEPHONE (OUTPATIENT)
Dept: OTHER | Facility: CLINIC | Age: 60
End: 2020-10-09

## 2020-10-09 ENCOUNTER — PATIENT OUTREACH (OUTPATIENT)
Dept: CARE COORDINATION | Facility: CLINIC | Age: 60
End: 2020-10-09

## 2020-10-09 VITALS
HEIGHT: 65 IN | BODY MASS INDEX: 16.33 KG/M2 | RESPIRATION RATE: 14 BRPM | SYSTOLIC BLOOD PRESSURE: 122 MMHG | DIASTOLIC BLOOD PRESSURE: 64 MMHG | OXYGEN SATURATION: 98 % | WEIGHT: 98 LBS | HEART RATE: 125 BPM | TEMPERATURE: 98.6 F

## 2020-10-09 DIAGNOSIS — I73.9 CLAUDICATION OF RIGHT LOWER EXTREMITY (H): ICD-10-CM

## 2020-10-09 DIAGNOSIS — J44.1 COPD EXACERBATION (H): ICD-10-CM

## 2020-10-09 DIAGNOSIS — I73.9 PVD (PERIPHERAL VASCULAR DISEASE) (H): Primary | ICD-10-CM

## 2020-10-09 DIAGNOSIS — Z91.030 BEE STING ALLERGY: ICD-10-CM

## 2020-10-09 DIAGNOSIS — J18.9 PNEUMONIA OF LEFT LOWER LOBE DUE TO INFECTIOUS ORGANISM: ICD-10-CM

## 2020-10-09 DIAGNOSIS — R06.03 RESPIRATORY DISTRESS: ICD-10-CM

## 2020-10-09 DIAGNOSIS — I73.9 PAD (PERIPHERAL ARTERY DISEASE) (H): Primary | ICD-10-CM

## 2020-10-09 DIAGNOSIS — B37.0 ORAL THRUSH: ICD-10-CM

## 2020-10-09 LAB — INTERPRETATION ECG - MUSE: NORMAL

## 2020-10-09 PROCEDURE — 99214 OFFICE O/P EST MOD 30 MIN: CPT | Performed by: INTERNAL MEDICINE

## 2020-10-09 RX ORDER — EPINEPHRINE 0.3 MG/.3ML
0.3 INJECTION SUBCUTANEOUS
Qty: 1 EACH | Refills: 1 | Status: SHIPPED | OUTPATIENT
Start: 2020-10-09 | End: 2024-07-05

## 2020-10-09 RX ORDER — ALBUTEROL SULFATE 90 UG/1
AEROSOL, METERED RESPIRATORY (INHALATION)
Qty: 18 INHALER | Refills: 11 | Status: SHIPPED | OUTPATIENT
Start: 2020-10-09 | End: 2021-06-17

## 2020-10-09 RX ORDER — LORAZEPAM 0.5 MG/1
0.5 TABLET ORAL EVERY 8 HOURS PRN
Qty: 15 TABLET | Refills: 0 | Status: SHIPPED | OUTPATIENT
Start: 2020-10-09 | End: 2023-03-30

## 2020-10-09 RX ORDER — FLUCONAZOLE 100 MG/1
TABLET ORAL
Qty: 10 TABLET | Refills: 0 | Status: SHIPPED | OUTPATIENT
Start: 2020-10-09 | End: 2020-10-19

## 2020-10-09 ASSESSMENT — MIFFLIN-ST. JEOR: SCORE: 1020.41

## 2020-10-09 NOTE — TELEPHONE ENCOUNTER
October 9, 2020    Patient is scheduled for JOSE M US on 10/12/2020 and a new patient consult appointment with Dr. Mazariegos on 10/13/2020 at [facility].     Denisa Tucker    [facility]  Office: [phone]  Fax [phone]

## 2020-10-09 NOTE — TELEPHONE ENCOUNTER
October 9, 2020    Patient called [facility] requesting to schedule a consult appointment with Dr. Stein.     Patient was seen at [facility] ED on 10/08/2020 for Leg Pain and Leg Swelling. She was diagnosed with Iliac Arterial Occlusion with PAD.     10/08/2020 CTA ABD PELVIS BILAT LEG RUNOFF W; BLE ARTERIAL & BLE VENOUS US in Epic.     Routing to nursing staff for review and to determine/enter the necessary orders needed to be completed prior.     (11/12/2020 appt with CAF is being reserved for patient until scheduling is advised)    Denisa Tucker    [facility]  Office: [phone]  Fax [phone]

## 2020-10-09 NOTE — PROGRESS NOTES
Clinic Care Coordination Contact  Community Health Worker Initial Outreach       Patient accepts CC: No, my adult children are living with me. I also have all my appointments scheduled and am ready to have surgery. She has no barriers with coordination of care.  We discussed medical bills and patient stated she had already looked into donn care and she is over income. She thanked me for the call today, but said, I'm good and don't need anything. Declined any needs for after surgery. Patient was provided introduction letter on 9/29/2020.

## 2020-10-09 NOTE — PROGRESS NOTES
"Subjective     Bailee Dixon is a 60 year old female who presents to clinic today for the following health issues:    HPI         ED/UC Followup:    Facility:  [facility] ED  Date of visit: 10/08/2020  Reason for visit: iliac artery occlusion  Current Status: improved        Patient is seen for a follow up visit.  Seen in ED yesterday for right lower leg pain, prominent veins.   Found to have extensive PVD involving the infrarenal aorta, iliac arteries.   No evidence of acute limb ischemia.   Scheduled to see vascular surgery.   Symptoms improved.   Has h/o COPD, recent exacerbation/ pneumonia. Treated with antibiotics, steroids, improved. Now is at baseline, mild cough, no SOB, no CP.   No fevers.   Has h/o oral thrush related to antibiotics. Feels mouth burning , mucosa is red   Concern for increased anxiety, feeling stressed , lost her job, now with ongoing medical problems     Review of Systems   Constitutional, HEENT, cardiovascular, pulmonary, gi and gu systems are negative, except as otherwise noted.      Objective    /64   Pulse 125   Temp 98.6  F (37  C) (Oral)   Resp 14   Ht 1.651 m (5' 5\")   Wt 44.5 kg (98 lb)   SpO2 98%   Breastfeeding No   BMI 16.31 kg/m    Body mass index is 16.31 kg/m .  Physical Exam   GENERAL: frail, underweight, alert and no distress  EYES: Eyes grossly normal to inspection, PERRL and conjunctivae and sclerae normal  HENT: ear canals and TM's normal, nose and mouth without ulcers or lesions  NECK: no adenopathy, no asymmetry, masses, or scars and thyroid normal to palpation  RESP: lungs clear to auscultation - no rales, rhonchi or wheezes  CV: regular rate and rhythm, normal S1 S2, no S3 or S4, no murmur, click or rub, no peripheral edema and peripheral pulses strong  ABDOMEN: soft, nontender, no hepatosplenomegaly, no masses and bowel sounds normal  MS: no gross musculoskeletal defects noted, no edema            Assessment & Plan   Problem List Items " Addressed This Visit     COPD exacerbation (H)    Relevant Medications    ipratropium (ATROVENT HFA) 17 MCG/ACT inhaler    VENTOLIN  (90 Base) MCG/ACT inhaler      Other Visit Diagnoses     PVD (peripheral vascular disease) (H)    -  Primary    Bee sting allergy        Relevant Medications    EPINEPHrine (ANY BX GENERIC EQUIV) 0.3 MG/0.3ML injection 2-pack    ipratropium (ATROVENT HFA) 17 MCG/ACT inhaler    VENTOLIN  (90 Base) MCG/ACT inhaler    Oral thrush        Relevant Medications    fluconazole (DIFLUCAN) 100 MG tablet    ipratropium (ATROVENT HFA) 17 MCG/ACT inhaler    VENTOLIN  (90 Base) MCG/ACT inhaler    Pneumonia of left lower lobe due to infectious organism        Relevant Medications    fluconazole (DIFLUCAN) 100 MG tablet    ipratropium (ATROVENT HFA) 17 MCG/ACT inhaler    VENTOLIN  (90 Base) MCG/ACT inhaler    Respiratory distress        Relevant Medications    ipratropium (ATROVENT HFA) 17 MCG/ACT inhaler    VENTOLIN  (90 Base) MCG/ACT inhaler    LORazepam (ATIVAN) 0.5 MG tablet           Follow up with vascular surgery   Continue treatment  Short course of PRN Ativan for increased anxiety related to medical and social issues        See Patient Instructions  No follow-ups on file.    Robert Singh MD  [facility]

## 2020-10-09 NOTE — TELEPHONE ENCOUNTER
Pt admitted yesterday. She also has appt with provider today, if she comes in, Dr Singh can refill.

## 2020-10-09 NOTE — ED PROVIDER NOTES
I assumed care of this patient from Sadie Avendano PA-C pending results of her CTA.  Images show abdominal aortic stenosis, as well as extensive peripheral artery disease but no evidence of acute ischemic limb.  The findings were discussed in detail with vascular surgery Dr. Cruz.  He does not recommend anticoagulation, antiplatelet or emergent vascular intervention at this time as no evidence of acute occlusion/ischemic limb.  Therefore heparin drip was stopped.  I discussed these recommendations with Dr. Chappell.  I had a discussion with the patient regarding options including admission to the hospital for pain control and vascular medicine evaluation versus discharged home with outpatient follow-up within 1 week with vascular medicine per Dr. Cruz's recommendation.  The patient strongly prefers to go home at this time.  She understands the importance of returning to the emergency department immediately if she is having increased pain, or if she develops worsening coldness, numbness, fever, discoloration, or any other new or worsening symptoms.     Walter Gonzales PA-C  10/08/20 1952

## 2020-10-09 NOTE — DISCHARGE INSTRUCTIONS
You should return to ED immediately if increasing pain, cold limb, numbness, discoloration, fever, or other new or worsening symptoms.

## 2020-10-09 NOTE — TELEPHONE ENCOUNTER
"Briefly reviewed imaging and ED notes.    \"IMPRESSION:  1.  Severe focal infrarenal abdominal aortic stenosis secondary to large calcified plaque.  2.  Occlusion of the proximal left common iliac artery; left external iliac artery reconstituted, possibly via internal iliac artery branches.  3.  Severe right common iliac artery stenosis, possibly subtotal stenosis with small caliber but patent right external iliac artery.  4.  Single vessel runoff to the feet bilaterally via posterior tibial artery. Diminutive and poorly opacified peroneal and anterior tibial arteries bilaterally.\"    Per protocol, pt needs also needs JOSE M with exercise and in person consult with Vascular Surgery within 1 week.      Appt note:  New consult, ref by Walter Gonzales PA-C ([facility] ED) for severe focal infrarenal aortic stenosis, occlusion of proximal left common iliac, severe right common iliac artery stenosis, single vessel runoff bilaterally via PT.    Routing to scheduling to contact pt to coordinate.  Please add date JOSE M completed on in the appt note.    MARIANNA LewisN, RN-BC  [facility]          "

## 2020-10-12 ENCOUNTER — HOSPITAL ENCOUNTER (OUTPATIENT)
Dept: ULTRASOUND IMAGING | Facility: CLINIC | Age: 60
Discharge: HOME | End: 2020-10-12
Attending: SURGERY | Admitting: SURGERY
Payer: COMMERCIAL

## 2020-10-12 DIAGNOSIS — I73.9 PAD (PERIPHERAL ARTERY DISEASE) (H): ICD-10-CM

## 2020-10-12 DIAGNOSIS — I73.9 CLAUDICATION OF RIGHT LOWER EXTREMITY (H): ICD-10-CM

## 2020-10-12 PROCEDURE — 93924 LWR XTR VASC STDY BILAT: CPT

## 2020-10-13 ENCOUNTER — OFFICE VISIT (OUTPATIENT)
Dept: OTHER | Facility: CLINIC | Age: 60
End: 2020-10-13
Attending: SURGERY
Payer: COMMERCIAL

## 2020-10-13 VITALS
WEIGHT: 100 LBS | HEART RATE: 105 BPM | SYSTOLIC BLOOD PRESSURE: 132 MMHG | DIASTOLIC BLOOD PRESSURE: 86 MMHG | BODY MASS INDEX: 17.07 KG/M2 | HEIGHT: 64 IN | OXYGEN SATURATION: 99 % | RESPIRATION RATE: 18 BRPM

## 2020-10-13 DIAGNOSIS — I73.9 CLAUDICATION OF RIGHT LOWER EXTREMITY (H): Primary | ICD-10-CM

## 2020-10-13 PROCEDURE — G0463 HOSPITAL OUTPT CLINIC VISIT: HCPCS

## 2020-10-13 PROCEDURE — 99204 OFFICE O/P NEW MOD 45 MIN: CPT | Performed by: SURGERY

## 2020-10-13 RX ORDER — ATORVASTATIN CALCIUM 20 MG/1
20 TABLET, FILM COATED ORAL DAILY
Qty: 90 TABLET | Refills: 3 | Status: SHIPPED | OUTPATIENT
Start: 2020-10-13 | End: 2020-12-01 | Stop reason: ALTCHOICE

## 2020-10-13 RX ORDER — CLOPIDOGREL BISULFATE 75 MG/1
75 TABLET ORAL DAILY
Qty: 90 TABLET | Refills: 3 | Status: SHIPPED | OUTPATIENT
Start: 2020-10-13 | End: 2021-10-19

## 2020-10-13 ASSESSMENT — MIFFLIN-ST. JEOR: SCORE: 1013.6

## 2020-10-13 NOTE — PROGRESS NOTES
"Vascular Surgery Clinic     Bailee Dixon MRN# [id number]   YOB: 1960 Age: 60 year old        Reason for Clinic Visit: PAD              History of Present Illness:   Bailee Dixon is a 60 year old female who presents for evaluation of high-grade aortoiliac stenosis/occlsuion.     She was seen recently in the [facility] ED for right leg swelling and discomfort, and was found on CT imaging to have distal aortic stenosis and right common iliac artery high-grade stenosis, with left common iliac artery occlusion.     COPD, non-O2 dependent. She uses her ventolin 2-4x/day. She had NSTEMI vs Takotsubo cardiomyopathy in 2016/2017; she attributes this to levaquin that she was prescribed. She did not take the statin and aspirin because she had hypotension. It seems like she never followed up with Cardiology.    She has had leg cramps for years, she reports. She will get charley horses at night, with severe cramping in her calves; she feels better if she massages the calves or puts ice on the legs. She has never had leg ulcers or toe wounds. She feels like her toes are \"always cold\".     She sleeps sitting up on occasion, but does not need to dangle her legs over the edge of the bed. She does have a recliner that she prefers to sleep in, \"because it it comfortable\"--not due to orthopnea or leg pain.     No history of a stroke; no AYDEE; no history of DM.     She is able to walk with minimal limitation. She says that she noticed cramping and aching in her legs that forced her to stop walking at about 1 block while on vacation with friends; but she is able to get around her home, over the skyway ramp, around stores, and uses her exercise bicycle at home nearly daily.           Past Medical History:   I have personally reviewed the following:   Past Medical History:   Diagnosis Date     COPD exacerbation (H) 09/26/2020     Coronary artery disease      DDD (degenerative disc disease), lumbar      Hyperlipidemia  " "    Migraine without aura, without mention of intractable migraine without mention of status migrainosus     infreq     NSTEMI (non-ST elevated myocardial infarction) (H)      Other acne     occ infected skin cysts     Smoking 2011   Chronic lower back pain.          Past Surgical History:   I have personally reviewed the following:   Past Surgical History:   Procedure Laterality Date     APPENDECTOMY       HYSTERECTOMY TOTAL ABDOMINAL, BILATERAL SALPINGO-OOPHORECTOMY, COMBINED      ALEX/BSO for endometriosis, with incidental appendectomy   Tonsillectomy in childhood.          Social History:   I have personally reviewed the following:   Social History     Tobacco Use     Smoking status: Former Smoker     Packs/day: 1.00     Years: 21.00     Pack years: 21.00     Types: Cigarettes     Quit date: 10/17/2011     Years since quittin.9     Smokeless tobacco: Never Used     Tobacco comment: started  per pt, occasionally now   Substance Use Topics     Alcohol use: Yes     Alcohol/week: 0.0 standard drinks     Comment: 2-3 drinks weekly   Smoked 1 ppd for 30+ years; quit in 2020. She was able to quit intermittently in the past around her pregnancies. Drinks 6 pack/week of beer. Medical marijuana use; no other illicit drug use. Worked as a . She has been keeping busy with housework-staining her deck; painting rooms, cleaning carpets, etc.          Family History:     Family History   Problem Relation Age of Onset     Arthritis Mother         SLE     Hypertension Mother      Asthma Mother      Thyroid Disease Mother      Diabetes Maternal Grandmother    No history of aneurysms in the family; mother had lupus with hypercoagulability.           Allergies:     Allergies   Allergen Reactions     Levaquin [Levofloxacin] Shortness Of Breath     Bee Venom      Doxycycline      \"swelling of hands and feet\"     Losartan      Turns red              Medications:     Current Outpatient " "Medications Ordered in Epic   Medication     acetaminophen (TYLENOL) 325 MG tablet     EPINEPHrine (ANY BX GENERIC EQUIV) 0.3 MG/0.3ML injection 2-pack     fluconazole (DIFLUCAN) 100 MG tablet     ipratropium (ATROVENT HFA) 17 MCG/ACT inhaler     LORazepam (ATIVAN) 0.5 MG tablet     VENTOLIN  (90 Base) MCG/ACT inhaler             Review of Systems:   The 10 point Review of Systems is negative other than noted in the HPI          Physical Exam:   Vitals were reviewed      BP: 132/86 Pulse: 105   Resp: 18 SpO2: 99 %        General: sitting comfortably in chair, NAD.   Neuro/Psych: A&O x 4, pleasantly conversant   HENT: EOMI and conjugate. Moist mucous membranes. Smoker's cough.   Cardiac: Regular rate and rhythm, no m/g appreciated.   Pulm: Lungs CTAB, no w/r/r.  Abd: Soft, non-distended, no tenderness to palpation. Thin.   Extrem: Grossly normal and symmetric ROM in all four extremities. No edema.  Skin: Clean, dry, no rashes or wounds.  Vasc: No dependent rubor or pallor over either foot. Quiet mono-biphasic PT and AT signals found on right; quiet mono-biphasic PT and DP signals found on left.           Data:   Labs:       Lab Results   Component Value Date     10/08/2020    Lab Results   Component Value Date    CHLORIDE 100 10/08/2020    Lab Results   Component Value Date    BUN 7 10/08/2020      Lab Results   Component Value Date    POTASSIUM 4.2 10/08/2020    Lab Results   Component Value Date    CO2 26 10/08/2020    Lab Results   Component Value Date    CR 0.51 10/08/2020        Lab Results   Component Value Date    WBC 19.6 (H) 10/08/2020    HGB 14.6 10/08/2020    HCT 44.2 10/08/2020    MCV 93 10/08/2020     10/08/2020     Lipid Panel (1/20/20): chol 196   hdl 102   LDL 80   Trig 68    Echocardiogram (4/19/17): \"The visual ejection fraction is estimated at 50-55%. No regional wall motion abnormalities noted. The right ventricle is normal in structure, function and size. There is no " "pericardial effusion.\" Note: LVEF was 30-35% in 12/2016, attributed to either \"LAD infarction or stress cardiomyopathy\".     I have reviewed the following images:  ABIs (10/12/20): R JOSE M 0.32, L JOSE M 0.34 --exercise not done     CTA abd/pelvis (10/8/20): \"1.  Severe focal infrarenal abdominal aortic stenosis secondary to large calcified plaque. 2.  Occlusion of the proximal left common iliac artery; left external iliac artery reconstituted, possibly via internal iliac artery branches. 3.  Severe right common iliac artery stenosis, possibly subtotal stenosis with small caliber but patent right external iliac artery. 4.  Single vessel runoff to the feet bilaterally via posterior tibial artery. Diminutive and poorly opacified peroneal and anterior tibial arteries bilaterally.\"           Assessment and Plan:   Ms. Dixon is a 60 year old female with history of COPD, past smoking, CAD with prior NSTEMI and resolved cardiomyopathy, and severe PAD from aortic stenosis, left iliac occlusion, and right iliac stenosis.       Discussed that the etiology of her sudden right leg swelling and redness is a little unclear, as it is unlikely to be related to her severe but chronic PAD. Her PAD may be contributing to some of her nocturnal cramping, but she has no evidence of rest pain or tissue loss.     We discussed that with her current claudication, goals would be 1) complete smoking cessation; 2) medical optimization; 3) structured exercise therapy    I briefly reviewed with her that definitive operation would be aortobifemoral bypass grafting, but this would only be done in the setting of tissue loss (ulcers or nonhealing wounds), lifestyle-limiting claudication, or rest pain    Will begin her on aspirin 81 mg, plavix 75 mg, and a statin (atorvastatin 20 mg). These should continue daily indefinitely.     Reviewed with her that the statin is helpful for her PAD and CAD even without significant cholesterol elevations    Will " refer her to PAD rehab for structured exercise therapy. She is unsure if her current insurance will cover this but will talk with them.    Will see her back in clinic in 6 months to check in with her, with repeat exercise ABIs and carotid duplex US.     Marisa Mazariegos MD

## 2020-10-13 NOTE — PROGRESS NOTES
"Bailee Dixon is a 60 year old female who presents for:  Chief Complaint   Patient presents with     RECHECK     New consult, ref by Walter Gonzales PA-C ([facility] ED) for severe focal infrarenal aortic stenosis, occlusion of proximal left common iliac, severe right common iliac artery stenosis, single vessel runoff bilaterally via PT. NV        Vitals:    Vitals:    10/13/20 0859 10/13/20 0901   BP: 133/85 132/86   BP Location: Right arm Left arm   Patient Position: Chair    Cuff Size: Adult Regular    Pulse: 105    Resp: 18    SpO2: 99%    Weight: 100 lb (45.4 kg)    Height: 5' 4\" (1.626 m)        BMI:  Estimated body mass index is 17.16 kg/m  as calculated from the following:    Height as of this encounter: 5' 4\" (1.626 m).    Weight as of this encounter: 100 lb (45.4 kg).    Pain Score:  Data Unavailable        Rebecca Manzo CMA    "

## 2020-10-14 ENCOUNTER — MYC MEDICAL ADVICE (OUTPATIENT)
Dept: INTERNAL MEDICINE | Facility: CLINIC | Age: 60
End: 2020-10-14

## 2020-10-14 DIAGNOSIS — J06.9 UPPER RESPIRATORY TRACT INFECTION, UNSPECIFIED TYPE: Primary | ICD-10-CM

## 2020-10-14 DIAGNOSIS — B37.0 ORAL THRUSH: ICD-10-CM

## 2020-10-14 NOTE — TELEPHONE ENCOUNTER
Called patient and relayed below message and patient stated she that this antibiotic she turns very red.  Patient would like a less strong antibiotic. Stated she tolerated clindamycin in the past.  Patient also wondering if she can get some cough medicine to help her sleep.

## 2020-10-15 ENCOUNTER — TELEPHONE (OUTPATIENT)
Dept: INTERNAL MEDICINE | Facility: CLINIC | Age: 60
End: 2020-10-15

## 2020-10-15 DIAGNOSIS — J44.1 COPD EXACERBATION (H): Primary | ICD-10-CM

## 2020-10-15 RX ORDER — AZITHROMYCIN 250 MG/1
TABLET, FILM COATED ORAL
Qty: 6 TABLET | Refills: 0 | Status: SHIPPED | OUTPATIENT
Start: 2020-10-15 | End: 2020-12-01

## 2020-10-15 NOTE — TELEPHONE ENCOUNTER
Please also see Zyga message from 10/14/20. Primary care provider not in clinic, will route to covering provider.

## 2020-10-15 NOTE — TELEPHONE ENCOUNTER
Patient calling  She was given amoxicillin-clavulanate (AUGMENTIN) 875-125 MG tablet yesterday but patient states she is allergic and wants a Z Vikram  Ok to call and lm [phone]

## 2020-10-19 RX ORDER — FLUCONAZOLE 100 MG/1
TABLET ORAL
Qty: 10 TABLET | Refills: 0 | Status: SHIPPED | OUTPATIENT
Start: 2020-10-19 | End: 2020-12-15

## 2020-10-19 NOTE — TELEPHONE ENCOUNTER
Routing refill request to provider for review/approval because:  RN unable to fill per protocol

## 2020-10-29 ENCOUNTER — TELEPHONE (OUTPATIENT)
Dept: OTHER | Facility: CLINIC | Age: 60
End: 2020-10-29

## 2020-10-29 NOTE — TELEPHONE ENCOUNTER
Please offer pt an 10/30/20 for an evisit with Dr. Mazariegos to discuss her concerns.    Time is currently being held at 11:30am on 10/30/20 for patient.    MARIANNA LewisN, RN-[facility] [place]

## 2020-10-29 NOTE — TELEPHONE ENCOUNTER
"Patient had a Consult Appointment with Dr Mazariegos on 10/13/20 and was put on Lipitor & Plavix. Patient stated that her legs started hurting so bad and after reading the side effects from the Lipitor she quit taking it this past weekend. The pain got somewhat better but now her feet and toes hurt so bad that she feels like she wants to \"go in and get them cut off\". Bailee can be reached at [phone].  "

## 2020-10-30 ENCOUNTER — VIRTUAL VISIT (OUTPATIENT)
Dept: OTHER | Facility: CLINIC | Age: 60
End: 2020-10-30
Attending: SURGERY
Payer: COMMERCIAL

## 2020-10-30 DIAGNOSIS — I73.9 PAD (PERIPHERAL ARTERY DISEASE) (H): Primary | ICD-10-CM

## 2020-10-30 PROCEDURE — 99213 OFFICE O/P EST LOW 20 MIN: CPT | Mod: 95 | Performed by: SURGERY

## 2020-10-30 RX ORDER — ROSUVASTATIN CALCIUM 5 MG/1
5 TABLET, COATED ORAL DAILY
Qty: 30 TABLET | Refills: 3 | Status: SHIPPED | OUTPATIENT
Start: 2020-10-30 | End: 2021-03-01

## 2020-10-30 NOTE — PROGRESS NOTES
"Vascular Surgery TELEHEALTH VISIT     Bailee Dixon MRN# [id number]   YOB: 1960 Age: 60 year old        Reason for Clinic Call: Leg pains after starting atorvastatin.              History of Present Illness:   Bailee Dixon is a 60 year old female who calls for evaluation. She was started on atorvastatin 20 mg on 10/13/20 after evaluation revealed high-grade distal aortic and iliac stenoses/occlusion.    Her groin to toes were hurting bilaterally. The pain started about 1 week after starting atorvastatin. She additionally had \"stomach issues\", described as diarrhea everytime she ate. Her legs felt better 4 days after stopping the statin. The sensation was new, not similar to prior symptoms.     She has not yet started PAD rehab, as she is waiting for her insurance to decide what the deductible will be. She has continued with her smoking cessation, and is not currently smoking (!).                  Allergies:     Allergies   Allergen Reactions     Levaquin [Levofloxacin] Shortness Of Breath     Bee Venom      Doxycycline      \"swelling of hands and feet\"     Losartan      Turns red              Medications:     Current Outpatient Medications Ordered in Epic   Medication     acetaminophen (TYLENOL) 325 MG tablet     amoxicillin-clavulanate (AUGMENTIN) 875-125 MG tablet     aspirin (ASA) 81 MG EC tablet     atorvastatin (LIPITOR) 20 MG tablet     azithromycin (ZITHROMAX) 250 MG tablet     clopidogrel (PLAVIX) 75 MG tablet     EPINEPHrine (ANY BX GENERIC EQUIV) 0.3 MG/0.3ML injection 2-pack     fluconazole (DIFLUCAN) 100 MG tablet     ipratropium (ATROVENT HFA) 17 MCG/ACT inhaler     LORazepam (ATIVAN) 0.5 MG tablet     VENTOLIN  (90 Base) MCG/ACT inhaler          Physical Exam:     No physical exam done. This was a telehealth visit.          Data:   Labs:  Lipid Panel (1/20/20): chol 196   hdl 102   LDL 80   Trig 68           Assessment and Plan:   Ms. Dixon is a 60 year old female " with history of COPD, past smoking, CAD with prior NSTEMI and resolved cardiomyopathy, and severe PAD from aortic stenosis, left iliac occlusion, and right iliac stenosis.       Stopping atorvastatin, will start rosuvastin (Crestor) 5 mg daily    Will repeat CBC, lipid panel, and check CK level (low suspicion of myositis)    Will plan to have a video visit follow-up in 1 month to evaluate her response to the rosuvastatin    Marisa Mazariegos MD    Total time of video call: 7 minutes

## 2020-10-30 NOTE — PROGRESS NOTES
"Bailee Dixon is a 60 year old female who is being evaluated via a billable video visit.      The patient has been notified of following:     \"This video visit will be conducted via a call between you and your physician/provider. We have found that certain health care needs can be provided without the need for an in-person physical exam.  This service lets us provide the care you need with a video conversation.  If a prescription is necessary we can send it directly to your pharmacy.  If lab work is needed we can place an order for that and you can then stop by our lab to have the test done at a later time.    Video visits are billed at different rates depending on your insurance coverage.  Please reach out to your insurance provider with any questions.    If during the course of the call the physician/provider feels a video visit is not appropriate, you will not be charged for this service.\"    Patient has given verbal consent for Video visit? Yes  How would you like to obtain your AVS? MyChart  If you are dropped from the video visit, the video invite should be resent to: Text to cell phone: [phone]  Will anyone else be joining your video visit? No        Video-Visit Details    Type of service:  Video Visit    Originating Location (pt. Location): Home    Distant Location (provider location):  [facility] [place]     Platform used for Video Visit: Kathy        "

## 2020-11-05 ENCOUNTER — MYC MEDICAL ADVICE (OUTPATIENT)
Dept: OTHER | Facility: CLINIC | Age: 60
End: 2020-11-05

## 2020-11-05 ENCOUNTER — OFFICE VISIT (OUTPATIENT)
Dept: URGENT CARE | Facility: URGENT CARE | Age: 60
End: 2020-11-05
Payer: COMMERCIAL

## 2020-11-05 VITALS — TEMPERATURE: 97.2 F | OXYGEN SATURATION: 98 % | HEART RATE: 90 BPM

## 2020-11-05 DIAGNOSIS — J01.00 ACUTE NON-RECURRENT MAXILLARY SINUSITIS: ICD-10-CM

## 2020-11-05 DIAGNOSIS — J42 CHRONIC BRONCHITIS, UNSPECIFIED CHRONIC BRONCHITIS TYPE (H): Primary | ICD-10-CM

## 2020-11-05 PROCEDURE — 99214 OFFICE O/P EST MOD 30 MIN: CPT | Performed by: FAMILY MEDICINE

## 2020-11-05 RX ORDER — CLINDAMYCIN HCL 300 MG
300 CAPSULE ORAL 3 TIMES DAILY
Qty: 30 CAPSULE | Refills: 0 | Status: SHIPPED | OUTPATIENT
Start: 2020-11-05 | End: 2020-11-15

## 2020-11-05 NOTE — PROGRESS NOTES
SUBJECTIVE:  Chief Complaint   Patient presents with     Urgent Care     No fevers      Sinus Problem     Started Monday, taking musinex      Cough     Coughing green and yellow mucous      Bailee Dixon is a 60 year old female here with concerns about  cough  and shortness of breath. and sinus pressure and pain.  She has chronic bronchitis-  Gets COPD exacerbations with  Quick worsening   Patient denies central chest pain. and pleuritic chest pain   She states onset of symptoms were 5 day(s) ago.  Her cough is described as persistent, daytime, nightime, productive green and mucoid and spasmodic.    The patient's respiratory symptoms are moderate and worsening.      The patient's respiratory symptoms are exacerbated by no particular triggers    She has had maxillary, frontal pressure.   Course of the sinus symptoms  is worsening and the severity is moderate  Predisposing factors for developing sinus symptoms include recent illness.  Current and Associated symptoms:  facial pain/pressure and headache  Recent treatment has included: OTC meds      Past Medical History:   Diagnosis Date     COPD exacerbation (H) 09/26/2020     Coronary artery disease      DDD (degenerative disc disease), lumbar      Hyperlipidemia      Migraine without aura, without mention of intractable migraine without mention of status migrainosus     infreq     NSTEMI (non-ST elevated myocardial infarction) (H)      Other acne     occ infected skin cysts     Smoking 04/26/2011     Patient Active Problem List   Diagnosis     Migraine without aura     CARDIOVASCULAR SCREENING; LDL GOAL LESS THAN 160     Smoking     Chronic low back pain     Acute respiratory failure (H)     NSTEMI (non-ST elevated myocardial infarction) (H)     Anxiety     Chronic bronchitis, unspecified chronic bronchitis type (H)     COPD exacerbation (H)         ALLERGIES:  Levaquin [levofloxacin], Bee venom, Doxycycline, and Losartan    MEDs       amoxicillin-clavulanate  (AUGMENTIN) 875-125 MG tablet, Take 1 tablet by mouth 2 times daily       aspirin (ASA) 81 MG EC tablet, Take 1 tablet (81 mg) by mouth daily       azithromycin (ZITHROMAX) 250 MG tablet, Two tablets first day, then one tablet daily for four days.       clopidogrel (PLAVIX) 75 MG tablet, Take 1 tablet (75 mg) by mouth daily       EPINEPHrine (ANY BX GENERIC EQUIV) 0.3 MG/0.3ML injection 2-pack, Inject 0.3 mLs (0.3 mg) into the muscle once as needed for anaphylaxis       fluconazole (DIFLUCAN) 100 MG tablet, As needed for oral thrush       LORazepam (ATIVAN) 0.5 MG tablet, Take 1 tablet (0.5 mg) by mouth every 8 hours as needed for anxiety       rosuvastatin (CRESTOR) 5 MG tablet, Take 1 tablet (5 mg) by mouth daily       VENTOLIN  (90 Base) MCG/ACT inhaler, INHALE 2 PUFFS BY MOUTH EVERY 6 HOURS AS NEEDED FOR WHEEZE OR FOR SHORTNESS OF BREATH       acetaminophen (TYLENOL) 325 MG tablet, Take 325-650 mg by mouth every 4 hours as needed for mild pain       atorvastatin (LIPITOR) 20 MG tablet, Take 1 tablet (20 mg) by mouth daily (Patient not taking: Reported on 10/30/2020)       ipratropium (ATROVENT HFA) 17 MCG/ACT inhaler, Inhale 2 puffs into the lungs 4 times daily (Patient not taking: Reported on 2020)         fentaNYL Citrate (PF) (SUBLIMAZE) 100 MCG/2ML injection       heparin (porcine) 1000 UNIT/ML injection       midazolam (VERSED) 1 MG/ML injection       nitroglycerin 100 MCG/ML injection       verapamil (ISOPTIN) 2.5 MG/ML injection        Social History     Tobacco Use     Smoking status: Former Smoker     Packs/day: 1.00     Years: 21.00     Pack years: 21.00     Types: Cigarettes     Quit date: 10/17/2011     Years since quittin.0     Smokeless tobacco: Never Used     Tobacco comment: started  per pt, occasionally now   Substance Use Topics     Alcohol use: Yes     Alcohol/week: 0.0 standard drinks     Comment: 2-3 drinks weekly       Family History   Problem Relation Age of Onset      Arthritis Mother         SLE     Hypertension Mother      Asthma Mother      Thyroid Disease Mother      Diabetes Maternal Grandmother        ROS:  CONSTITUTIONAL:NEGATIVE for fever, chills, change in weight  INTEGUMENTARY/SKIN: NEGATIVE for worrisome rashes, moles or lesions  EYES: NEGATIVE for vision changes or irritation  GI: NEGATIVE for nausea, abdominal pain, heartburn, or change in bowel habits    OBJECTIVE:  Pulse 90   Temp 97.2  F (36.2  C)   SpO2 98%     GENERAL APPEARANCE: healthy, alert and moderate distress, occasional congested cough  EYES: EOMI,  PERRL, conjunctiva clear  HENT: ear canals and TM's normal.  Nose normal.  Pharynx erythematous with some exudate noted..  Frontal and maxillary tenderness to percussion  NECK: supple, non-tender to palpation, no adenopathy noted  RESP: lungs clear to auscultation - no rales, rhonchi or wheezes  CV: regular rates and rhythm, normal S1 S2, no murmur noted  ABDOMEN:  soft, nontender, no HSM or masses and bowel sounds normal  SKIN: no suspicious lesions or rashes         ASSESSMENT:  Chronic bronchitis, unspecified chronic bronchitis type (H)     - clindamycin (CLEOCIN) 300 MG capsule; Take 1 capsule (300 mg) by mouth 3 times daily for 10 days    She said she had good experience with clindamycin 18 months ago-  Requested to repeat the medication-      Symptomatic measures for cough and chest congestion are encouraged, humidified air, plenty of fluids.  Patient may consider OTC expectorant and/or cough suppressant to treat symptoms.         Acute non-recurrent maxillary sinusitis     - clindamycin (CLEOCIN) 300 MG capsule; Take 1 capsule (300 mg) by mouth 3 times daily for 10 days        We discussed the primary importance of home cares to promote drainage and ventilation of the sinuses to decrease symptoms of sinus pressure and to eliminate infectious drainage from the sinuses.  I encouraged the use of saline nasal spray as needed to promote cleaning of the  nasal passages and to promote drainage of the sinuses.  Allergy medications and steroid nasal spray help reduce swelling within the nasal tissue and may help open drainage/ ventilation passages to the sinuses.  Expectorants are recommended rather than decongestants to help promote sinus drainage.    She declined flonase RX        Follow up with primary clinic if not improving

## 2020-11-05 NOTE — PATIENT INSTRUCTIONS
Patient Education     What Is Chronic Bronchitis?  Chronic bronchitis is when damaged lungs make more mucus than they should. If you cough up mucus  for at least 3 months each year, 2 or more years in a row, without another diagnosis to explain the cough, you may have chronic bronchitis.  Healthy lungs  This is what happens when your lungs are healthy:    Inside the lungs are branching airways of stretchy tissue. Each airway is wrapped with bands of muscle that help keep it open. Air travels in and out of the lungs through these airways.    The cells in the lining of the airways produce a sticky fluid called mucus. This traps dust, smoke, and other particles in the air you breathe and helps protect the lungs.    Tiny hairs called cilia then sweep the mucus up the airways to the throat, where it is swallowed or coughed up, again to protect the lungs.  When you have chronic bronchitis  This is what happens when you have chronic bronchitis:    Cells in the airways make more mucus than normal. The mucus builds up, narrowing the airways. This means less air travels into and out of the lungs.    The lining of the airways may also become swollen (inflamed). And the muscle surrounding the airways may tighten (constrict). These problems cause the airways to narrow even more.    The cilia may also be damaged. This means they can t sweep mucus and particles away. This damage makes the problems described above even worse.                [employer] last reviewed this educational content on 8/1/2018 2000-2020 [employer]. [address] [place] [address], [place] [address]. All rights reserved. This information is not intended as a substitute for professional medical care. Always follow your healthcare professional's instructions.

## 2020-11-06 ENCOUNTER — TELEPHONE (OUTPATIENT)
Dept: OTHER | Facility: CLINIC | Age: 60
End: 2020-11-06

## 2020-11-06 NOTE — TELEPHONE ENCOUNTER
"LOV 10/30/20 with Dr. Mazariegos,   \"Will plan to have a video visit follow-up in 1 month to evaluate her response to the rosuvastatin\".     Routing to  to coordinate 1 month video visit follow up with Dr. Mazariegos.     Hx of PAD, check on response to Rosuvastatin.     RENE Maradiaga, RN    [facility]  Office:  [phone] Fax: [phone]    "

## 2020-11-06 NOTE — TELEPHONE ENCOUNTER
Bailee is scheduled for 12/1/2020 video visit with Dr. Mazariegos.     1 month video visit follow up with Dr. Yair KELLEY

## 2020-12-01 ENCOUNTER — VIRTUAL VISIT (OUTPATIENT)
Dept: OTHER | Facility: CLINIC | Age: 60
End: 2020-12-01
Attending: SURGERY
Payer: COMMERCIAL

## 2020-12-01 DIAGNOSIS — I73.9 PAD (PERIPHERAL ARTERY DISEASE) (H): Primary | ICD-10-CM

## 2020-12-01 PROCEDURE — 99213 OFFICE O/P EST LOW 20 MIN: CPT | Mod: 95 | Performed by: SURGERY

## 2020-12-01 NOTE — PROGRESS NOTES
"Vascular Surgery TELEHEALTH VISIT     Bailee Dixon MRN# [id number]   YOB: 1960 Age: 60 year old        Reason for Clinic Call: F/U after med change             History of Present Illness:   Bailee Dixon is a 60 year old female who presents for follow-up after starting atorvastatin --> converted to rosuvastatin.  She says some days her legs \"feel really bad\" with pins-and-needles sensations, exacerbated with cold temperatures. This is inconsistently exacerbated by exercise; she uses her exercise bike at home and sometimes is able to go for quite a while and other times will use it for 10 minutes and then need to rest before she can walk up the flight of stairs in her home.   Other days, her legs will feel completely normal and she has no limitation in her exercise or activities. She does feel better overall than while on the atorvastatin.               Past Medical History:   I have personally reviewed the following:   Past Medical History:   Diagnosis Date     COPD exacerbation (H) 09/26/2020     Coronary artery disease      DDD (degenerative disc disease), lumbar      Hyperlipidemia      Migraine without aura, without mention of intractable migraine without mention of status migrainosus     infreq     NSTEMI (non-ST elevated myocardial infarction) (H)      Other acne     occ infected skin cysts     Smoking 04/26/2011             Past Surgical History:   I have personally reviewed the following:   Past Surgical History:   Procedure Laterality Date     APPENDECTOMY  1989     HYSTERECTOMY TOTAL ABDOMINAL, BILATERAL SALPINGO-OOPHORECTOMY, COMBINED  1989    ALEX/BSO for endometriosis, with incidental appendectomy           Medications:     Current Outpatient Medications Ordered in Epic   Medication     acetaminophen (TYLENOL) 325 MG tablet     aspirin (ASA) 81 MG EC tablet     clopidogrel (PLAVIX) 75 MG tablet     EPINEPHrine (ANY BX GENERIC EQUIV) 0.3 MG/0.3ML injection 2-pack     fluconazole " (DIFLUCAN) 100 MG tablet     ipratropium (ATROVENT HFA) 17 MCG/ACT inhaler     LORazepam (ATIVAN) 0.5 MG tablet     rosuvastatin (CRESTOR) 5 MG tablet     VENTOLIN  (90 Base) MCG/ACT inhaler           Physical Exam:     No physical exam done. This was a telehealth visit.            Assessment and Plan:   Ms. Dixon is a 60 year old female with history of COPD, past smoking, CAD with prior NSTEMI and resolved cardiomyopathy, and severe PAD from aortic stenosis, left iliac occlusion, and right iliac stenosis.       She was meant to have labs drawn --> developed bronchitis and deferred her labs. Will reorder.     Encouraged her to follow up with her PCP in the next 1-2 months as we have changed several of her medications and she will need longitudinal care.     She is to schedule with PAD rehab, now that she has found that she has a $0 copay. Encouraged her to do so, as I believe this will be important for her.    Will plan to continue the rosuvastatin.     Discussed with her that she will need to remain on the aspirin, plavix, and rosuvastatin long-term (indefinitely she will need aspirin and a statin for her PAD and CAD).    Congratulated her on the fact that she has remained abstinent from cigarettes.     Marisa Mazariegos MD    Total time of telephone call: 6 minutes  Video visit attempted, aborted secondary to poor connection

## 2020-12-01 NOTE — PROGRESS NOTES
"Bailee Dixon is a 60 year old female who is being evaluated via a billable video visit.      The patient has been notified of following:     \"This video visit will be conducted via a call between you and your physician/provider. We have found that certain health care needs can be provided without the need for an in-person physical exam.  This service lets us provide the care you need with a video conversation.  If a prescription is necessary we can send it directly to your pharmacy.  If lab work is needed we can place an order for that and you can then stop by our lab to have the test done at a later time.    Video visits are billed at different rates depending on your insurance coverage.  Please reach out to your insurance provider with any questions.    If during the course of the call the physician/provider feels a video visit is not appropriate, you will not be charged for this service.\"    Patient has given verbal consent for Video visit? Yes, mobile phone number [phone]    How would you like to obtain your AVS? My chart     Will anyone else be joining your video visit? No     Patient has not obtained vitals on 11/21/20   "

## 2020-12-08 ENCOUNTER — TELEPHONE (OUTPATIENT)
Dept: OTHER | Facility: CLINIC | Age: 60
End: 2020-12-08

## 2020-12-08 NOTE — TELEPHONE ENCOUNTER
LOV 12/1/20 Yair   Per Dr. Mazariegos request,   I called pt, left vm explaining orders for LABS placed and pt should have drawn:  CK Total  Lipid reflex to direct LDL Panel  BMP  CBC with Platelets  May want to call nearest  clinic and schedule time for lab draws versus walk in.   And pt to follow up with PCP in near future.     If any questions to call us back. Contact phone number provided.    RENE Maradiaga, RN    [facility]  Office:  [phone] Fax: [phone]

## 2020-12-10 NOTE — TELEPHONE ENCOUNTER
Dr. Mazariegos to call pt with results of Labs upon receipt of results.     As of today, labs have not been completed per Epic.     RENE Maradiaga, RN    [facility]  Office:  [phone] Fax: [phone]

## 2020-12-11 ENCOUNTER — TELEPHONE (OUTPATIENT)
Dept: INTERNAL MEDICINE | Facility: CLINIC | Age: 60
End: 2020-12-11

## 2020-12-11 NOTE — TELEPHONE ENCOUNTER
Patient calling  Her son is sick with COVID   Patient and her daughter went to [employer] drive thru COVID testing. They gave her a packet which they had the patient swab her own nose. Her test is neg but her daughter is positive. She wants to know if she should get a different COVID test. Patient is feeling fine   Her son is sick but her daughter who is positive is feeling just fine Please advise. Ok to call and nichole [phone]

## 2020-12-11 NOTE — TELEPHONE ENCOUNTER
No need for another test. Should self quarantine because of contact with COVID.   If she develops symptoms, to call.

## 2020-12-11 NOTE — TELEPHONE ENCOUNTER
See the message below.   Children live with pt. They both have COVID.   Her test was negative. Should she only quarantine, or do another test?   She could order saliva home kit if she prefers, through the [facility].

## 2020-12-15 ENCOUNTER — OFFICE VISIT (OUTPATIENT)
Dept: URGENT CARE | Facility: URGENT CARE | Age: 60
End: 2020-12-15
Payer: COMMERCIAL

## 2020-12-15 ENCOUNTER — ANCILLARY PROCEDURE (OUTPATIENT)
Dept: GENERAL RADIOLOGY | Facility: CLINIC | Age: 60
End: 2020-12-15
Attending: PHYSICIAN ASSISTANT
Payer: COMMERCIAL

## 2020-12-15 VITALS
DIASTOLIC BLOOD PRESSURE: 84 MMHG | SYSTOLIC BLOOD PRESSURE: 132 MMHG | BODY MASS INDEX: 17.16 KG/M2 | WEIGHT: 100 LBS | TEMPERATURE: 98.4 F | RESPIRATION RATE: 20 BRPM | HEART RATE: 94 BPM | OXYGEN SATURATION: 96 %

## 2020-12-15 DIAGNOSIS — J20.9 ACUTE BRONCHITIS, UNSPECIFIED ORGANISM: Primary | ICD-10-CM

## 2020-12-15 DIAGNOSIS — R06.02 SOB (SHORTNESS OF BREATH): ICD-10-CM

## 2020-12-15 PROCEDURE — 99214 OFFICE O/P EST MOD 30 MIN: CPT | Performed by: PHYSICIAN ASSISTANT

## 2020-12-15 PROCEDURE — 71046 X-RAY EXAM CHEST 2 VIEWS: CPT | Performed by: RADIOLOGY

## 2020-12-15 RX ORDER — BENZONATATE 200 MG/1
200 CAPSULE ORAL 3 TIMES DAILY PRN
Qty: 30 CAPSULE | Refills: 0 | Status: SHIPPED | OUTPATIENT
Start: 2020-12-15 | End: 2021-05-14

## 2020-12-15 RX ORDER — AZITHROMYCIN 250 MG/1
TABLET, FILM COATED ORAL
Qty: 6 TABLET | Refills: 0 | Status: SHIPPED | OUTPATIENT
Start: 2020-12-15 | End: 2020-12-20

## 2020-12-15 RX ORDER — PREDNISONE 20 MG/1
20 TABLET ORAL DAILY
Qty: 5 TABLET | Refills: 0 | Status: SHIPPED | OUTPATIENT
Start: 2020-12-15 | End: 2020-12-20

## 2020-12-15 ASSESSMENT — ENCOUNTER SYMPTOMS
FEVER: 0
HEADACHES: 1
APPETITE CHANGE: 0
SORE THROAT: 1
GASTROINTESTINAL NEGATIVE: 1
DIZZINESS: 1
COUGH: 1

## 2020-12-15 NOTE — PATIENT INSTRUCTIONS
Patient Education     Bronchitis, Antibiotic Treatment (Adult)    Bronchitis is an infection of the air passages (bronchial tubes) in your lungs. It often occurs when you have a cold. This illness is contagious during the first few days and is spread through the air by coughing and sneezing, or by direct contact (touching the sick person and then touching your own eyes, nose, or mouth).  Symptoms of bronchitis include cough with mucus (phlegm) and low-grade fever. Bronchitis usually lasts 7 to 14 days. Mild cases can be treated with simple home remedies. More severe infection is treated with an antibiotic.  Home care  Follow these guidelines when caring for yourself at home:    If your symptoms are severe, rest at home for the first 2 to 3 days. When you go back to your usual activities, don't let yourself get too tired.    Don't smoke. Also stay away from secondhand smoke.    You may use over-the-counter medicines to control fever or pain, unless another medicine was prescribed. If you have chronic liver or kidney disease or have ever had a stomach ulcer or gastrointestinal bleeding, talk with your healthcare provider before using these medicines. Also talk to your provider if you are taking medicine to prevent blood clots. Aspirin should never be given to anyone younger than 18 who is ill with a viral infection or fever. It may cause severe liver or brain damage.    Your appetite may be low, so a light diet is fine. Stay well hydrated by drinking 6 to 8 glasses of fluids per day. This includes water, soft drinks, sports drinks, juices, tea, or soup. Extra fluids will help loosen mucus in your nose and lungs.    Over-the-counter cough, cold, and sore-throat medicines will not shorten the length of the illness, but they may be helpful to reduce your symptoms. Don't use decongestants if you have high blood pressure.    Finish all antibiotic medicine. Do this even if you are feeling better after only a few  days.  Follow-up care  Follow up with your healthcare provider, or as advised. If you had an X-ray or ECG (electrocardiogram), a specialist will review it. You will be told of any new test results that may affect your care.  If you are age 65 or older, if you smoke, or if you have a chronic lung disease or condition that affects your immune system, ask your healthcare provider about getting a pneumococcal vaccine and a yearly flu shot (influenza vaccine).  When to seek medical advice  Call your healthcare provider right away if any of these occur:    Fever of 100.4 F (38 C) or higher, or as directed by your healthcare provider    Coughing up more sputum    Weakness, drowsiness, headache, facial pain, ear pain, or a stiff neck  Call 911  Call 911 if any of these occur.    Coughing up blood    Weakness, drowsiness, headache, or stiff neck that get worse    Trouble breathing, wheezing, or pain with breathing  [employer] last reviewed this educational content on 6/1/2018 2000-2020 [employer]. [address] [place] [address]. All rights reserved. This information is not intended as a substitute for professional medical care. Always follow your healthcare professional's instructions.           Patient Education     COPD Flare    You have had a flare-up of your COPD.  COPD (chronic obstructive pulmonary disease) is a common lung disease. It causes your airways to get irritated and narrower. This makes it harder for you to breathe. Emphysema and chronic bronchitis are both types of COPD. This is a long-term (chronic) condition. This means you always have it. Sometimes it gets worse. When this happens, it is called a flare-up.  Symptoms of COPD  People with COPD may have symptoms most of the time. In a flare-up, your symptoms get worse. These symptoms may mean you are having a flare-up:    Shortness of breath, shallow or rapid breathing, or wheezing that gets worse    Lung infection    Cough that  gets worse    More mucus, thicker mucus or mucus of a different color    Tiredness, less energy, or trouble doing your normal activities    Fever    Chest tightness    Your symptoms don t get better even when you use your normal medicines, inhalers, and nebulizer    Trouble talking    You feel confused  Causes of flare-ups  Unfortunately, a flare-up can happen even if you did everything right. And even if you followed your healthcare provider s instructions. Some causes of flare-ups are:    Smoking or secondhand smoke    Colds, the flu, or respiratory infections    Air pollution    Sudden change in the weather    Dust, irritating chemicals, or strong fumes    Not taking your medicines as prescribed  Home care  Here are some things you can do at home to treat a flare-up:    Try not to panic. This makes it harder to breathe, and keeps you from doing the right things.    Don t smoke or be around others who are smoking.    Try to drink more fluids than normal during a flare-up, unless your healthcare provider has told you not to because of heart and kidney problems. More fluids can help loosen the mucus.    Use your inhalers and nebulizer, if you have one, as you have been told to. When using a metered dose inhaler or nebulizer, it's very important to use the proper techniques. If you have any questions about how to use your device, contact your healthcare provider or refer to the user manual.    If you were given antibiotics, take them until they are used up or your provider tells you to stop. It s important to finish the antibiotics, even though you feel better. This will make sure the infection has cleared.    If you were given a steroid, finish it even if you feel better.    Learn the names of your medicines, as well as how and when to use them. Talk with your provider about other conditions you have and their treatment and how it may affect your COPD.    Oxygen may be prescribed if tests show that your blood  contains too little oxygen. Ask your provider about long-term oxygen therapy.    Coping tips for shortness of breath include:  ? Exercise. Try to be as active as possible. This will improve energy levels and strengthen your muscles, so you can do more.  ? Breathing methods. Ask your healthcare provider or nurse to show you how to do pursed-lip breathing.  ? Balance rest and activity. Each day, try to balance rest periods with activity. For example, you might start the day with getting dressed and eating breakfast. Then you can relax and read the paper. After that, take a brief walk. And then sit with your feet up for a while.  ? Pulmonary rehab (rehabilitation). These programs help with managing your disease, breathing methods, exercise, support, and counseling. To find one, ask your provider or call your local hospital. Also, talk with your healthcare provider about a self-management program.  ? Healthy eating. Eating a healthy, balanced diet is important to staying as healthy as possible. So is trying to stay at your ideal weight. Make sure you have a lot of fruits and vegetables every day. And also eat balanced portions of whole grains, lean meats and fish, and low-fat dairy products.  Preventing a flare-up  Flare-ups happen. But the best way to treat one is to prevent it before it starts. Here are some pointers:    Don t smoke or be around others who are smoking. Avoid using e-cigarettes due to their harmful side effects.    Take your medicines as discussed with your healthcare provider.    Talk with your provider about getting a flu shot every year. Also find out if you need a pneumonia shot.    If there is a weather advisory warning to stay indoors, try to stay inside when possible.    Try to eat healthy, exercise, and get plenty of sleep.    Try to stay away from things that normally set you off. These include dust, chemical fumes, hairsprays, or strong perfumes.  Follow-up care  Follow up with your  healthcare provider, or as advised.  If a culture was done, you will be told if your treatment needs to be changed. You can call as directed for the results.  If X-rays were done, you will be told of any new findings that may affect your care.  During each appointment, talk with your healthcare provider about your ability to:    [place] in your normal environment    Correctly use inhaler (or your medicine delivery systems)    [place] with other conditions you have and their treatments and how they may affect your COPD  Call 911  Call 911 if any of these occur:    Wheezing or shortness or breath does not get better with treatment    Chest pain or chest tightness    Feeling lightheaded or dizzy    You have trouble breathing    You feel confused or it s hard to wake you up    You faint or lose consciousness    You have a rapid heart rate    You have new pain in your chest, arm, shoulder, neck, or upper back  When to seek medical advice  Call your healthcare provider right away if any of these occur:    Fever of 100.4 F (38 C) or higher, or as directed by your healthcare provider    Coughing up lots of dark-colored or bloody mucus (sputum)    You don't start to get better within 24 hours    Swelling of your ankles gets worse    Weakness  [employer] last reviewed this educational content on 4/1/2019 2000-2020 [employer]. [address] [place] [address], [place] [address]. All rights reserved. This information is not intended as a substitute for professional medical care. Always follow your healthcare professional's instructions.

## 2020-12-15 NOTE — PROGRESS NOTES
"SUBJECTIVE:   Bailee Dixon is a 60 year old female presenting with a chief complaint of   Chief Complaint   Patient presents with     Urgent Care     URI     10 days  with cough, headache. Negative Covid test this AM       She is an established patient of [facility].  Patient presents with a productive cough - yellow x 10 days.  HA x 5 days.  Negative covid test today and last one done on6th of December.  CP on left side with cough.  Intermittent right ear pain and ST from coughing.  Some dizziness that comes and goes.  Coughing keeps up at night.  Using albuterol using 10-12 x a day.   Patient using \"air\" (99% air in a bottle).  Patient last used ventolin at 10:30.  Last steroids used in September - 10 day dose.      Treatment:  Mucinex, tylenol decongestants, cough syrup.      URI Adult    Onset of symptoms was 10 day(s) ago.  Course of illness is same.    Severity moderate  Current and Associated symptoms: cough - productive, shortness of breath, ear pain right and sore throat  Treatment measures tried include Tylenol/Ibuprofen, Decongestants and OTC Cough med.  Predisposing factors include HX of COPD.    Review of Systems   Constitutional: Negative for appetite change and fever.   HENT: Positive for congestion, ear pain and sore throat.    Respiratory: Positive for cough.    Cardiovascular: Positive for chest pain.   Gastrointestinal: Negative.    Skin: Negative.    Neurological: Positive for dizziness and headaches.   All other systems reviewed and are negative.      Past Medical History:   Diagnosis Date     COPD exacerbation (H) 09/26/2020     Coronary artery disease      DDD (degenerative disc disease), lumbar      Hyperlipidemia      Migraine without aura, without mention of intractable migraine without mention of status migrainosus     infreq     NSTEMI (non-ST elevated myocardial infarction) (H)      Other acne     occ infected skin cysts     PAD (peripheral artery disease) (H)     severe; aortic " stenosis, left iliac occlusion, right iliac stenosis     Smoking     quit as of 2020     Family History   Problem Relation Age of Onset     Arthritis Mother         SLE     Hypertension Mother      Asthma Mother      Thyroid Disease Mother      Diabetes Maternal Grandmother      Current Outpatient Medications   Medication Sig Dispense Refill     acetaminophen (TYLENOL) 325 MG tablet Take 325-650 mg by mouth every 4 hours as needed for mild pain       aspirin (ASA) 81 MG EC tablet Take 1 tablet (81 mg) by mouth daily 360 tablet 1     azithromycin (ZITHROMAX) 250 MG tablet Take 2 tablets (500 mg) by mouth daily for 1 day, THEN 1 tablet (250 mg) daily for 4 days. 6 tablet 0     benzonatate (TESSALON) 200 MG capsule Take 1 capsule (200 mg) by mouth 3 times daily as needed for cough 30 capsule 0     clopidogrel (PLAVIX) 75 MG tablet Take 1 tablet (75 mg) by mouth daily 90 tablet 3     EPINEPHrine (ANY BX GENERIC EQUIV) 0.3 MG/0.3ML injection 2-pack Inject 0.3 mLs (0.3 mg) into the muscle once as needed for anaphylaxis 1 each 1     ipratropium (ATROVENT HFA) 17 MCG/ACT inhaler Inhale 2 puffs into the lungs 4 times daily 1 Inhaler 0     LORazepam (ATIVAN) 0.5 MG tablet Take 1 tablet (0.5 mg) by mouth every 8 hours as needed for anxiety 15 tablet 0     predniSONE (DELTASONE) 20 MG tablet Take 1 tablet (20 mg) by mouth daily for 5 days 5 tablet 0     rosuvastatin (CRESTOR) 5 MG tablet Take 1 tablet (5 mg) by mouth daily 30 tablet 3     VENTOLIN  (90 Base) MCG/ACT inhaler INHALE 2 PUFFS BY MOUTH EVERY 6 HOURS AS NEEDED FOR WHEEZE OR FOR SHORTNESS OF BREATH 18 Inhaler 11     Social History     Tobacco Use     Smoking status: Former Smoker     Packs/day: 1.00     Years: 21.00     Pack years: 21.00     Types: Cigarettes     Quit date: 10/17/2011     Years since quittin.1     Smokeless tobacco: Never Used     Tobacco comment: started  per pt, occasionally now   Substance Use Topics     Alcohol use: Yes      Alcohol/week: 0.0 standard drinks     Comment: 2-3 drinks weekly       OBJECTIVE  /84   Pulse 94   Temp 98.4  F (36.9  C) (Oral)   Resp 20   Wt 45.4 kg (100 lb)   SpO2 96%   BMI 17.16 kg/m      Physical Exam  Vitals signs and nursing note reviewed.   Constitutional:       Appearance: Normal appearance. She is normal weight.   HENT:      Head: Normocephalic and atraumatic.      Right Ear: Tympanic membrane, ear canal and external ear normal.      Left Ear: Tympanic membrane and ear canal normal.      Mouth/Throat:      Mouth: Mucous membranes are moist.      Pharynx: Oropharynx is clear.   Eyes:      Extraocular Movements: Extraocular movements intact.      Conjunctiva/sclera: Conjunctivae normal.   Neck:      Musculoskeletal: Normal range of motion and neck supple.   Cardiovascular:      Rate and Rhythm: Normal rate and regular rhythm.      Pulses: Normal pulses.      Heart sounds: Normal heart sounds.   Pulmonary:      Effort: Pulmonary effort is normal.      Breath sounds: Wheezing and rhonchi present.   Skin:     General: Skin is warm and dry.      Capillary Refill: Capillary refill takes less than 2 seconds.   Neurological:      General: No focal deficit present.      Mental Status: She is alert.   Psychiatric:         Mood and Affect: Mood normal.         Behavior: Behavior normal.         Labs:  Results for orders placed or performed in visit on 12/15/20 (from the past 24 hour(s))   XR Chest 2 Views    Narrative    CHEST TWO VIEWS December 15, 2020 2:01 PM     HISTORY: SOB (shortness of breath).    COMPARISON: Chest x-ray 9/26/2020.      Impression    IMPRESSION: PA and lateral views of the chest. Lungs are hyperinflated  but otherwise clear. Findings likely reflect COPD. Heart is normal in  size. Possible trace pleural effusions bilaterally versus pleural  diaphragmatic scarring. No pneumothorax.    REYNALDO FRY MD       X-Ray was done, my findings are: hyperinflation.  Xrays personally viewed by  myself.      ASSESSMENT:      ICD-10-CM    1. Acute bronchitis, unspecified organism  J20.9 predniSONE (DELTASONE) 20 MG tablet     azithromycin (ZITHROMAX) 250 MG tablet     benzonatate (TESSALON) 200 MG capsule   2. SOB (shortness of breath)  R06.02 XR Chest 2 Views        Medical Decision Making:    Differential Diagnosis:  URI Adult/Peds:  Bronchitis-viral, Pneumonia, Viral syndrome and URI, COPD exacerbation.    Serious Comorbid Conditions:  Adult:  as above    PLAN:    Rx for prednisone, tessalon perles and zpak.  Supportive care.  Discussed reasons to seek immediate medical attention.        Followup:    If not improving or if condition worsens, follow up with your Primary Care Provider, If not improving or if conditions worsens over the next 12-24 hours, go to the Emergency Department    Patient Instructions     Patient Education     Bronchitis, Antibiotic Treatment (Adult)    Bronchitis is an infection of the air passages (bronchial tubes) in your lungs. It often occurs when you have a cold. This illness is contagious during the first few days and is spread through the air by coughing and sneezing, or by direct contact (touching the sick person and then touching your own eyes, nose, or mouth).  Symptoms of bronchitis include cough with mucus (phlegm) and low-grade fever. Bronchitis usually lasts 7 to 14 days. Mild cases can be treated with simple home remedies. More severe infection is treated with an antibiotic.  Home care  Follow these guidelines when caring for yourself at home:    If your symptoms are severe, rest at home for the first 2 to 3 days. When you go back to your usual activities, don't let yourself get too tired.    Don't smoke. Also stay away from secondhand smoke.    You may use over-the-counter medicines to control fever or pain, unless another medicine was prescribed. If you have chronic liver or kidney disease or have ever had a stomach ulcer or gastrointestinal bleeding, talk with your  healthcare provider before using these medicines. Also talk to your provider if you are taking medicine to prevent blood clots. Aspirin should never be given to anyone younger than 18 who is ill with a viral infection or fever. It may cause severe liver or brain damage.    Your appetite may be low, so a light diet is fine. Stay well hydrated by drinking 6 to 8 glasses of fluids per day. This includes water, soft drinks, sports drinks, juices, tea, or soup. Extra fluids will help loosen mucus in your nose and lungs.    Over-the-counter cough, cold, and sore-throat medicines will not shorten the length of the illness, but they may be helpful to reduce your symptoms. Don't use decongestants if you have high blood pressure.    Finish all antibiotic medicine. Do this even if you are feeling better after only a few days.  Follow-up care  Follow up with your healthcare provider, or as advised. If you had an X-ray or ECG (electrocardiogram), a specialist will review it. You will be told of any new test results that may affect your care.  If you are age 65 or older, if you smoke, or if you have a chronic lung disease or condition that affects your immune system, ask your healthcare provider about getting a pneumococcal vaccine and a yearly flu shot (influenza vaccine).  When to seek medical advice  Call your healthcare provider right away if any of these occur:    Fever of 100.4 F (38 C) or higher, or as directed by your healthcare provider    Coughing up more sputum    Weakness, drowsiness, headache, facial pain, ear pain, or a stiff neck  Call 911  Call 911 if any of these occur.    Coughing up blood    Weakness, drowsiness, headache, or stiff neck that get worse    Trouble breathing, wheezing, or pain with breathing  [employer] last reviewed this educational content on 6/1/2018 2000-2020 [employer]. [address] [facility], [place] [address]. All rights reserved. This information is not intended as a  substitute for professional medical care. Always follow your healthcare professional's instructions.           Patient Education     COPD Flare    You have had a flare-up of your COPD.  COPD (chronic obstructive pulmonary disease) is a common lung disease. It causes your airways to get irritated and narrower. This makes it harder for you to breathe. Emphysema and chronic bronchitis are both types of COPD. This is a long-term (chronic) condition. This means you always have it. Sometimes it gets worse. When this happens, it is called a flare-up.  Symptoms of COPD  People with COPD may have symptoms most of the time. In a flare-up, your symptoms get worse. These symptoms may mean you are having a flare-up:    Shortness of breath, shallow or rapid breathing, or wheezing that gets worse    Lung infection    Cough that gets worse    More mucus, thicker mucus or mucus of a different color    Tiredness, less energy, or trouble doing your normal activities    Fever    Chest tightness    Your symptoms don t get better even when you use your normal medicines, inhalers, and nebulizer    Trouble talking    You feel confused  Causes of flare-ups  Unfortunately, a flare-up can happen even if you did everything right. And even if you followed your healthcare provider s instructions. Some causes of flare-ups are:    Smoking or secondhand smoke    Colds, the flu, or respiratory infections    Air pollution    Sudden change in the weather    Dust, irritating chemicals, or strong fumes    Not taking your medicines as prescribed  Home care  Here are some things you can do at home to treat a flare-up:    Try not to panic. This makes it harder to breathe, and keeps you from doing the right things.    Don t smoke or be around others who are smoking.    Try to drink more fluids than normal during a flare-up, unless your healthcare provider has told you not to because of heart and kidney problems. More fluids can help loosen the mucus.    Use  your inhalers and nebulizer, if you have one, as you have been told to. When using a metered dose inhaler or nebulizer, it's very important to use the proper techniques. If you have any questions about how to use your device, contact your healthcare provider or refer to the user manual.    If you were given antibiotics, take them until they are used up or your provider tells you to stop. It s important to finish the antibiotics, even though you feel better. This will make sure the infection has cleared.    If you were given a steroid, finish it even if you feel better.    Learn the names of your medicines, as well as how and when to use them. Talk with your provider about other conditions you have and their treatment and how it may affect your COPD.    Oxygen may be prescribed if tests show that your blood contains too little oxygen. Ask your provider about long-term oxygen therapy.    Coping tips for shortness of breath include:  ? Exercise. Try to be as active as possible. This will improve energy levels and strengthen your muscles, so you can do more.  ? Breathing methods. Ask your healthcare provider or nurse to show you how to do pursed-lip breathing.  ? Balance rest and activity. Each day, try to balance rest periods with activity. For example, you might start the day with getting dressed and eating breakfast. Then you can relax and read the paper. After that, take a brief walk. And then sit with your feet up for a while.  ? Pulmonary rehab (rehabilitation). These programs help with managing your disease, breathing methods, exercise, support, and counseling. To find one, ask your provider or call your local hospital. Also, talk with your healthcare provider about a self-management program.  ? Healthy eating. Eating a healthy, balanced diet is important to staying as healthy as possible. So is trying to stay at your ideal weight. Make sure you have a lot of fruits and vegetables every day. And also eat balanced  portions of whole grains, lean meats and fish, and low-fat dairy products.  Preventing a flare-up  Flare-ups happen. But the best way to treat one is to prevent it before it starts. Here are some pointers:    Don t smoke or be around others who are smoking. Avoid using e-cigarettes due to their harmful side effects.    Take your medicines as discussed with your healthcare provider.    Talk with your provider about getting a flu shot every year. Also find out if you need a pneumonia shot.    If there is a weather advisory warning to stay indoors, try to stay inside when possible.    Try to eat healthy, exercise, and get plenty of sleep.    Try to stay away from things that normally set you off. These include dust, chemical fumes, hairsprays, or strong perfumes.  Follow-up care  Follow up with your healthcare provider, or as advised.  If a culture was done, you will be told if your treatment needs to be changed. You can call as directed for the results.  If X-rays were done, you will be told of any new findings that may affect your care.  During each appointment, talk with your healthcare provider about your ability to:    [place] in your normal environment    Correctly use inhaler (or your medicine delivery systems)    [place] with other conditions you have and their treatments and how they may affect your COPD  Call 911  Call 911 if any of these occur:    Wheezing or shortness or breath does not get better with treatment    Chest pain or chest tightness    Feeling lightheaded or dizzy    You have trouble breathing    You feel confused or it s hard to wake you up    You faint or lose consciousness    You have a rapid heart rate    You have new pain in your chest, arm, shoulder, neck, or upper back  When to seek medical advice  Call your healthcare provider right away if any of these occur:    Fever of 100.4 F (38 C) or higher, or as directed by your healthcare provider    Coughing up lots of dark-colored or bloody mucus  (sputum)    You don't start to get better within 24 hours    Swelling of your ankles gets worse    Weakness  [employer] last reviewed this educational content on 4/1/2019 2000-2020 [employer]. [address] [place] [address], [place] [address]. All rights reserved. This information is not intended as a substitute for professional medical care. Always follow your healthcare professional's instructions.

## 2020-12-21 NOTE — TELEPHONE ENCOUNTER
Chart reviewed, labs still have not been obtained.     MARIANNA MaradiagaN, RN    [facility]  Office:  [phone] Fax: [phone]

## 2020-12-30 DIAGNOSIS — I73.9 PAD (PERIPHERAL ARTERY DISEASE) (H): ICD-10-CM

## 2020-12-30 LAB
ERYTHROCYTE [DISTWIDTH] IN BLOOD BY AUTOMATED COUNT: 12.4 % (ref 10–15)
HCT VFR BLD AUTO: 38.7 % (ref 35–47)
HGB BLD-MCNC: 13.2 G/DL (ref 11.7–15.7)
MCH RBC QN AUTO: 30.5 PG (ref 26.5–33)
MCHC RBC AUTO-ENTMCNC: 34.1 G/DL (ref 31.5–36.5)
MCV RBC AUTO: 89 FL (ref 78–100)
PLATELET # BLD AUTO: 361 10E9/L (ref 150–450)
RBC # BLD AUTO: 4.33 10E12/L (ref 3.8–5.2)
WBC # BLD AUTO: 11.5 10E9/L (ref 4–11)

## 2020-12-30 PROCEDURE — 85027 COMPLETE CBC AUTOMATED: CPT | Performed by: SURGERY

## 2020-12-30 PROCEDURE — 82550 ASSAY OF CK (CPK): CPT | Performed by: SURGERY

## 2020-12-30 PROCEDURE — 36415 COLL VENOUS BLD VENIPUNCTURE: CPT | Performed by: SURGERY

## 2020-12-30 PROCEDURE — 80048 BASIC METABOLIC PNL TOTAL CA: CPT | Performed by: SURGERY

## 2020-12-30 PROCEDURE — 80061 LIPID PANEL: CPT | Performed by: SURGERY

## 2020-12-31 LAB
ANION GAP SERPL CALCULATED.3IONS-SCNC: 7 MMOL/L (ref 3–14)
BUN SERPL-MCNC: 4 MG/DL (ref 7–30)
CALCIUM SERPL-MCNC: 8.6 MG/DL (ref 8.5–10.1)
CHLORIDE SERPL-SCNC: 101 MMOL/L (ref 94–109)
CHOLEST SERPL-MCNC: 189 MG/DL
CK SERPL-CCNC: 49 U/L (ref 30–225)
CO2 SERPL-SCNC: 23 MMOL/L (ref 20–32)
CREAT SERPL-MCNC: 0.57 MG/DL (ref 0.52–1.04)
GFR SERPL CREATININE-BSD FRML MDRD: >90 ML/MIN/{1.73_M2}
GLUCOSE SERPL-MCNC: 108 MG/DL (ref 70–99)
HDLC SERPL-MCNC: 105 MG/DL
LDLC SERPL CALC-MCNC: 62 MG/DL
NONHDLC SERPL-MCNC: 84 MG/DL
POTASSIUM SERPL-SCNC: 4.1 MMOL/L (ref 3.4–5.3)
SODIUM SERPL-SCNC: 131 MMOL/L (ref 133–144)
TRIGL SERPL-MCNC: 109 MG/DL

## 2021-01-01 DIAGNOSIS — J18.9 PNEUMONIA OF LEFT LOWER LOBE DUE TO INFECTIOUS ORGANISM: ICD-10-CM

## 2021-01-05 RX ORDER — ALBUTEROL SULFATE 90 UG/1
AEROSOL, METERED RESPIRATORY (INHALATION)
Qty: 18 INHALER | Refills: 11 | OUTPATIENT
Start: 2021-01-05

## 2021-01-07 NOTE — TELEPHONE ENCOUNTER
"Pt hx of \"severe PAD from aortic stenosis, left iliac occlusion and right iliac stenosis\".   Converted to Rosuvastatin (previously Atorvastatin).     Labs completed on 12/30/20.     Dr. Mazariegos to call with Lab results. Will ensure notification to call with results.     MARIANNA MaradiagaN, RN    [facility]  Office:  [phone] Fax: [phone]'  "

## 2021-01-08 ENCOUNTER — TELEPHONE (OUTPATIENT)
Dept: OTHER | Facility: CLINIC | Age: 61
End: 2021-01-08
Payer: COMMERCIAL

## 2021-01-08 PROCEDURE — 99213 OFFICE O/P EST LOW 20 MIN: CPT | Performed by: SURGERY

## 2021-01-08 NOTE — TELEPHONE ENCOUNTER
Telephone Visit    Ms. Dixon is a 60 year old female with history of COPD, smoking, CAD with prior NSTEMI and resolved cardiomyopathy, and severe PAD from aortic stenosis, left iliac occlusion, and right iliac stenosis.     She underwent labs on 12/30/20.       Lab Results   Component Value Date     12/30/2020    Lab Results   Component Value Date    CHLORIDE 101 12/30/2020    Lab Results   Component Value Date    BUN 4 12/30/2020      Lab Results   Component Value Date    POTASSIUM 4.1 12/30/2020    Lab Results   Component Value Date    CO2 23 12/30/2020    Lab Results   Component Value Date    CR 0.57 12/30/2020        Lab Results   Component Value Date    WBC 11.5 (H) 12/30/2020    HGB 13.2 12/30/2020    HCT 38.7 12/30/2020    MCV 89 12/30/2020     12/30/2020     CK 49    Total Cholesterol 189       LDL 62  Triglycerides 109      She is currently on rosuvastatin 5 mg daily. She confirms on the phone today that she has been doing well with this, without recurrent myalgias. I would plan to continue this for optimal medical management of her PAD (in addition to aspirin and plavix).     She has been intermittently smoking, about 10 cigarettes a week; she relates this to stress around the holidays and from her children living with her. I again encouraged complete smoking cessation, which she says she is working on.     Plan to see her back in clinic in about 3 months (repeat imaging ordered during prior visit: Exercise ABIs, Aortoiliac Duplex US, Carotid Duplex US).     Marisa Mzaariegos MD  Total time of telephone call: 6 minutes

## 2021-01-14 ENCOUNTER — HEALTH MAINTENANCE LETTER (OUTPATIENT)
Age: 61
End: 2021-01-14

## 2021-03-01 DIAGNOSIS — I73.9 PAD (PERIPHERAL ARTERY DISEASE) (H): ICD-10-CM

## 2021-03-01 RX ORDER — ROSUVASTATIN CALCIUM 5 MG/1
5 TABLET, COATED ORAL DAILY
Qty: 30 TABLET | Refills: 3 | Status: SHIPPED | OUTPATIENT
Start: 2021-03-01 | End: 2021-07-02

## 2021-03-01 NOTE — TELEPHONE ENCOUNTER
Will refill, any Future refills should go through PCP.    Requested Prescriptions   Pending Prescriptions Disp Refills     rosuvastatin (CRESTOR) 5 MG tablet 30 tablet 3     Sig: Take 1 tablet (5 mg) by mouth daily       There is no refill protocol information for this order        Last Written Prescription Date:  10/30/20  Last Fill Quantity: 30,  # refills: 3   Last office visit: 10/13/2020 with prescribing provider:  1/8/21   Future Office Visit: 4/2021.     RENE Maradiaga, RN  [facility]  Office:  [phone] Fax: [phone]

## 2021-03-01 NOTE — TELEPHONE ENCOUNTER
[facility]    Who is the name of the provider?:  Yair      What is the location you see this provider at?: [place]    Reason for call:  States requested refill generic for Crestor 5 mg 2/22, pharmacy has not received ok.  Has 1 tablet left.  Please send to [facility]    Can we leave a detailed message on this number?  YES

## 2021-03-11 ENCOUNTER — MYC MEDICAL ADVICE (OUTPATIENT)
Dept: OTHER | Facility: CLINIC | Age: 61
End: 2021-03-11

## 2021-03-13 ENCOUNTER — HEALTH MAINTENANCE LETTER (OUTPATIENT)
Age: 61
End: 2021-03-13

## 2021-03-31 ENCOUNTER — MYC MEDICAL ADVICE (OUTPATIENT)
Dept: INTERNAL MEDICINE | Facility: CLINIC | Age: 61
End: 2021-03-31

## 2021-05-03 ENCOUNTER — HOSPITAL ENCOUNTER (OUTPATIENT)
Dept: ULTRASOUND IMAGING | Facility: CLINIC | Age: 61
Discharge: HOME | End: 2021-05-03
Attending: SURGERY | Admitting: SURGERY
Payer: COMMERCIAL

## 2021-05-03 ENCOUNTER — HOSPITAL ENCOUNTER (OUTPATIENT)
Dept: ULTRASOUND IMAGING | Facility: CLINIC | Age: 61
End: 2021-05-03
Attending: SURGERY
Payer: COMMERCIAL

## 2021-05-03 DIAGNOSIS — I73.9 CLAUDICATION OF RIGHT LOWER EXTREMITY (H): ICD-10-CM

## 2021-05-03 PROCEDURE — 93978 VASCULAR STUDY: CPT

## 2021-05-03 PROCEDURE — 93880 EXTRACRANIAL BILAT STUDY: CPT

## 2021-05-03 PROCEDURE — 93924 LWR XTR VASC STDY BILAT: CPT

## 2021-05-07 ENCOUNTER — OFFICE VISIT (OUTPATIENT)
Dept: OTHER | Facility: CLINIC | Age: 61
End: 2021-05-07
Attending: SURGERY
Payer: COMMERCIAL

## 2021-05-07 VITALS — TEMPERATURE: 98.2 F | HEART RATE: 106 BPM | SYSTOLIC BLOOD PRESSURE: 135 MMHG | DIASTOLIC BLOOD PRESSURE: 82 MMHG

## 2021-05-07 DIAGNOSIS — I73.9 PAD (PERIPHERAL ARTERY DISEASE) (H): Primary | ICD-10-CM

## 2021-05-07 PROCEDURE — G0463 HOSPITAL OUTPT CLINIC VISIT: HCPCS

## 2021-05-07 PROCEDURE — 99214 OFFICE O/P EST MOD 30 MIN: CPT | Performed by: SURGERY

## 2021-05-07 NOTE — PROGRESS NOTES
Vascular Surgery Progress Note     Date: May 7, 2021     Reason for Visit:  Longitudinal PAD rehab    Subjective:  Ms. Dixon feels she is doing very well overall.   She was unable to get to PAD Rehab before the end of last year, and says that the copay is now too high as she has not met her deductible for the year. She is walking to her mailbox, about a block away, daily; she is also using her exercise bike twice daily, for 10 minutes each time. She is able to get around at home without trouble and can get through grocery stores without difficulty.   She is occasionally getting pain in her lower back (not much in her buttocks).     Current Outpatient Medications:      acetaminophen (TYLENOL) 325 MG tablet, Take 325-650 mg by mouth every 4 hours as needed for mild pain, Disp: , Rfl:      aspirin (ASA) 81 MG EC tablet, Take 1 tablet (81 mg) by mouth daily, Disp: 360 tablet, Rfl: 1     benzonatate (TESSALON) 200 MG capsule, Take 1 capsule (200 mg) by mouth 3 times daily as needed for cough, Disp: 30 capsule, Rfl: 0     clopidogrel (PLAVIX) 75 MG tablet, Take 1 tablet (75 mg) by mouth daily, Disp: 90 tablet, Rfl: 3     LORazepam (ATIVAN) 0.5 MG tablet, Take 1 tablet (0.5 mg) by mouth every 8 hours as needed for anxiety, Disp: 15 tablet, Rfl: 0     rosuvastatin (CRESTOR) 5 MG tablet, Take 1 tablet (5 mg) by mouth daily, Disp: 30 tablet, Rfl: 3     VENTOLIN  (90 Base) MCG/ACT inhaler, INHALE 2 PUFFS BY MOUTH EVERY 6 HOURS AS NEEDED FOR WHEEZE OR FOR SHORTNESS OF BREATH, Disp: 18 Inhaler, Rfl: 11     EPINEPHrine (ANY BX GENERIC EQUIV) 0.3 MG/0.3ML injection 2-pack, Inject 0.3 mLs (0.3 mg) into the muscle once as needed for anaphylaxis, Disp: 1 each, Rfl: 1     ipratropium (ATROVENT HFA) 17 MCG/ACT inhaler, Inhale 2 puffs into the lungs 4 times daily, Disp: 1 Inhaler, Rfl: 0      Physical Exam   Temp: 98.2  F (36.8  C) Temp src: Temporal BP: 135/82 Pulse: 106            Vital Signs with Ranges  Temp:  [98.2  F  "(36.8  C)] 98.2  F (36.8  C)  Pulse:  [106] 106  BP: (135)/(82) 135/82  0 lbs 0 oz    Constitutional: cooperative, no apparent distress, sitting comfortably in chair.  Vascular: DP and PT on left dull monophasic; right DP very faint and monphasic, PT mono-biphasic  Musculoskeletal: grossly normal and symmetric ROM and strength in BL extremities   Neurologic: Awake, alert, oriented to name, place, time, and situation    Imaging:  I have reviewed the following imaging studies:  US ABIs (5/3/21): R JOSE M 0.74, exercise 0.38. L JOSE M 0.65, exercise 0.41    US Carotids (5/3/21): \"1. Per sonographic criteria, there are less than 50% stenoses in the internal carotid arteries. 2. Right thyroid nodule, 3.3 x 2.1 cm. Further evaluation with a dedicated thyroid ultrasound could be performed.\"    US Aorta (5/3/21): \"Sonographic findings suggest significant aortoiliac steno-occlusive disease.  There is a significant stenosis in the distal abdominal aorta and probable occlusions of the common iliac arteries as was seen on the previous CT angiogram.\"       Assessment & Plan   Ms. Dixon is a 60 year old female with history of COPD, past smoking, CAD with prior NSTEMI and resolved cardiomyopathy, and severe PAD from aortic stenosis, left iliac occlusion, and right iliac stenosis.       Smoking: she is still smoking once a week or so, socially when she is drinking. I discussed the imperative need for her to quit and we discussed potential options for her. She says she will \"try harder\".     PAD Rehab: she does not want a repeat referral currently due to the cost.     Aspirin 81 mg + Plavix 75 mg + Rosuvastatin 5 mg (? Intolerance of atorvastatin). Continue all indefinitely for optimal medical management of PAD.     Encouraged her to continue on her at-home exercising, and to consider gradually increasing her time on the exercise bike.     Will plan to see her back in approximately 6 months for repeat ABIs, or sooner if any questions " or concerns arise.     Sent staff message to PCP regarding thyroid nodule.     Marisa Mazariegos    Total time spent on the date of this encounter doing: chart review, review of test results, patient visit, physical exam, education, counseling, developing plan of care, and documenting = 30 minutes

## 2021-05-07 NOTE — PROGRESS NOTES
"Bailee Dixon is a 61 year old female who presents for:  Chief Complaint   Patient presents with     RECHECK      Ultrasounds done 05/03/21 - PAD; longitudinal follow-up         Vitals:    Vitals:    05/07/21 1039   BP: 135/82   BP Location: Left arm   Patient Position: Chair   Cuff Size: Adult Regular   Pulse: 106   Temp: 98.2  F (36.8  C)   TempSrc: Temporal       BMI:  Estimated body mass index is 17.16 kg/m  as calculated from the following:    Height as of 10/13/20: 5' 4\" (1.626 m).    Weight as of 12/15/20: 100 lb (45.4 kg).    Pain Score:  Data Unavailable        Marcela Valentine MA     "

## 2021-05-12 NOTE — TELEPHONE ENCOUNTER
----- Message from Marisa Mazariegos MD sent at 5/7/2021  3:52 PM CDT -----  Dear Dr. Singh,   I have seen Ms. Dixon in [facility] for her rather severe PAD. Fortunately, she is doing fairly well from that standpoint.   She did have an incidental finding of a 3.3 x 2.1 cm right thyroid nodule on her carotid duplex. She should probably have a TSH/T4 checked and potentially a dedicated thyroid ultrasound--would you be able to help her coordinate that?   Thanks so much,  Marisa Mazariegos

## 2021-05-13 ENCOUNTER — TELEPHONE (OUTPATIENT)
Dept: INTERNAL MEDICINE | Facility: CLINIC | Age: 61
End: 2021-05-13

## 2021-05-13 NOTE — TELEPHONE ENCOUNTER
Note from Vascular doctor states there was Thyroid nodule noted on US results from 5/3/21.     Dr Wu would like pt to schedule an appointment to discuss.     Pts last appt was 10/9/20.    Call to pt and scheduled for tomorrow. She agrees.     IMPRESSION:   1. Per sonographic criteria, there are less than 50% stenoses in the  internal carotid arteries.  2. Right thyroid nodule. Further evaluation with a dedicated thyroid  ultrasound could be performed.    TSH   Date Value Ref Range Status   01/20/2020 2.57 0.40 - 4.00 mU/L Final

## 2021-05-14 ENCOUNTER — OFFICE VISIT (OUTPATIENT)
Dept: INTERNAL MEDICINE | Facility: CLINIC | Age: 61
End: 2021-05-14
Payer: COMMERCIAL

## 2021-05-14 VITALS
OXYGEN SATURATION: 98 % | DIASTOLIC BLOOD PRESSURE: 74 MMHG | HEART RATE: 116 BPM | SYSTOLIC BLOOD PRESSURE: 137 MMHG | TEMPERATURE: 98 F | WEIGHT: 102 LBS | HEIGHT: 64 IN | BODY MASS INDEX: 17.42 KG/M2

## 2021-05-14 DIAGNOSIS — E04.1 THYROID NODULE: Primary | ICD-10-CM

## 2021-05-14 LAB — TSH SERPL DL<=0.005 MIU/L-ACNC: 1.16 MU/L (ref 0.4–4)

## 2021-05-14 PROCEDURE — 99213 OFFICE O/P EST LOW 20 MIN: CPT | Performed by: INTERNAL MEDICINE

## 2021-05-14 PROCEDURE — 84443 ASSAY THYROID STIM HORMONE: CPT | Performed by: INTERNAL MEDICINE

## 2021-05-14 PROCEDURE — 36415 COLL VENOUS BLD VENIPUNCTURE: CPT | Performed by: INTERNAL MEDICINE

## 2021-05-14 ASSESSMENT — MIFFLIN-ST. JEOR: SCORE: 1017.67

## 2021-05-14 NOTE — PROGRESS NOTES
"    Assessment & Plan     Thyroid nodule  Assess lab and US  - TSH with free T4 reflex  - US Thyroid; Future             See Patient Instructions    No follow-ups on file.    Robert Singh MD  [facility] DYLAN Morocho is a 61 year old who presents for the following health issues     HPI   Chief Complaint   Patient presents with     RECHECK     F/U on US results     Nail Problem     Bilateral big toe         Patient is seen for a follow up visit.  Has had carotid US with finding of right thyroid nodule.   No symptoms of palpitations, weight loss or gain, constipation, eyesight changes.     Concern for great toenails yellow discoloration and thickening.     Review of Systems   Constitutional, HEENT, cardiovascular, pulmonary, gi and gu systems are negative, except as otherwise noted.      Objective    /74 (BP Location: Right arm, Patient Position: Sitting, Cuff Size: Adult Small)   Pulse 116   Temp 98  F (36.7  C) (Oral)   Ht 1.626 m (5' 4\")   Wt 46.3 kg (102 lb)   SpO2 98%   BMI 17.51 kg/m    Body mass index is 17.51 kg/m .  Physical Exam   GENERAL: healthy, alert and no distress  EYES: Eyes grossly normal to inspection, PERRL and conjunctivae and sclerae normal  NECK: no adenopathy, no asymmetry, masses, or scars and thyroid normal to palpation  RESP: lungs clear to auscultation - no rales, +rhonchi , no wheezes  CV: regular rate and rhythm, normal S1 S2, no S3 or S4, no murmur, click or rub, no peripheral edema and peripheral pulses strong  MS: no gross musculoskeletal defects noted, no edema  SKIN: no suspicious lesions or rashes, bilateral 1st toenails distal yellow discoloration, mild thickening of the toenails.     Orders Only on 12/30/2020   Component Date Value Ref Range Status     CK Total 12/30/2020 49  30 - 225 U/L Final     Cholesterol 12/30/2020 189  <200 mg/dL Final     Triglycerides 12/30/2020 109  <150 mg/dL Final    Fasting specimen     HDL " Cholesterol 12/30/2020 105  >49 mg/dL Final     LDL Cholesterol Calculated 12/30/2020 62  <100 mg/dL Final    Desirable:       <100 mg/dl     Non HDL Cholesterol 12/30/2020 84  <130 mg/dL Final     Sodium 12/30/2020 131* 133 - 144 mmol/L Final     Potassium 12/30/2020 4.1  3.4 - 5.3 mmol/L Final     Chloride 12/30/2020 101  94 - 109 mmol/L Final     Carbon Dioxide 12/30/2020 23  20 - 32 mmol/L Final     Anion Gap 12/30/2020 7  3 - 14 mmol/L Final     Glucose 12/30/2020 108* 70 - 99 mg/dL Final    Fasting specimen     Urea Nitrogen 12/30/2020 4* 7 - 30 mg/dL Final     Creatinine 12/30/2020 0.57  0.52 - 1.04 mg/dL Final     GFR Estimate 12/30/2020 >90  >60 mL/min/[1.73_m2] Final    Comment: Non  GFR Calc  Starting 12/18/2018, serum creatinine based estimated GFR (eGFR) will be   calculated using the Chronic Kidney Disease Epidemiology Collaboration   (CKD-EPI) equation.       GFR Estimate If Black 12/30/2020 >90  >60 mL/min/[1.73_m2] Final    Comment:  GFR Calc  Starting 12/18/2018, serum creatinine based estimated GFR (eGFR) will be   calculated using the Chronic Kidney Disease Epidemiology Collaboration   (CKD-EPI) equation.       Calcium 12/30/2020 8.6  8.5 - 10.1 mg/dL Final     WBC 12/30/2020 11.5* 4.0 - 11.0 10e9/L Final     RBC Count 12/30/2020 4.33  3.8 - 5.2 10e12/L Final     Hemoglobin 12/30/2020 13.2  11.7 - 15.7 g/dL Final     Hematocrit 12/30/2020 38.7  35.0 - 47.0 % Final     MCV 12/30/2020 89  78 - 100 fl Final     MCH 12/30/2020 30.5  26.5 - 33.0 pg Final     MCHC 12/30/2020 34.1  31.5 - 36.5 g/dL Final     RDW 12/30/2020 12.4  10.0 - 15.0 % Final     Platelet Count 12/30/2020 361  150 - 450 10e9/L Final

## 2021-05-21 ENCOUNTER — HOSPITAL ENCOUNTER (OUTPATIENT)
Dept: ULTRASOUND IMAGING | Facility: CLINIC | Age: 61
Discharge: HOME | End: 2021-05-21
Attending: INTERNAL MEDICINE | Admitting: INTERNAL MEDICINE
Payer: COMMERCIAL

## 2021-05-21 DIAGNOSIS — E04.1 THYROID NODULE: ICD-10-CM

## 2021-05-21 PROCEDURE — 76536 US EXAM OF HEAD AND NECK: CPT

## 2021-06-15 DIAGNOSIS — J18.9 PNEUMONIA OF LEFT LOWER LOBE DUE TO INFECTIOUS ORGANISM: ICD-10-CM

## 2021-06-17 RX ORDER — ALBUTEROL SULFATE 90 UG/1
AEROSOL, METERED RESPIRATORY (INHALATION)
Qty: 18 G | Refills: 9 | Status: SHIPPED | OUTPATIENT
Start: 2021-06-17 | End: 2021-10-10

## 2021-06-17 NOTE — TELEPHONE ENCOUNTER
Pending Prescriptions:                       Disp   Refills    VENTOLIN  (90 Base) MCG/ACT inhale*18 g   9            Sig: INHALE 2 PUFFS BY MOUTH EVERY 6 HOURS AS NEEDED           FOR WHEEZE OR FOR SHORTNESS OF BREATH    Prescription approved per [facility] Refill Protocol.

## 2021-07-01 DIAGNOSIS — I73.9 PAD (PERIPHERAL ARTERY DISEASE) (H): ICD-10-CM

## 2021-07-02 RX ORDER — ROSUVASTATIN CALCIUM 5 MG/1
5 TABLET, COATED ORAL DAILY
Qty: 30 TABLET | Refills: 3 | Status: SHIPPED | OUTPATIENT
Start: 2021-07-02 | End: 2021-11-01

## 2021-07-02 NOTE — TELEPHONE ENCOUNTER
Pending Prescriptions:                       Disp   Refills    rosuvastatin (CRESTOR) 5 MG tablet         30 tab*3        Sig: Take 1 tablet (5 mg) by mouth daily    See routing comment below    Routing refill request to provider for review/approval because:  Last ordered by an outside provider

## 2021-07-13 ENCOUNTER — TRANSFERRED RECORDS (OUTPATIENT)
Dept: HEALTH INFORMATION MANAGEMENT | Facility: CLINIC | Age: 61
End: 2021-07-13

## 2021-10-10 ENCOUNTER — OFFICE VISIT (OUTPATIENT)
Dept: URGENT CARE | Facility: URGENT CARE | Age: 61
End: 2021-10-10
Payer: COMMERCIAL

## 2021-10-10 VITALS
RESPIRATION RATE: 16 BRPM | SYSTOLIC BLOOD PRESSURE: 124 MMHG | DIASTOLIC BLOOD PRESSURE: 66 MMHG | TEMPERATURE: 98.4 F | OXYGEN SATURATION: 98 % | BODY MASS INDEX: 17.75 KG/M2 | HEART RATE: 101 BPM | HEIGHT: 64 IN | WEIGHT: 104 LBS

## 2021-10-10 DIAGNOSIS — R05.9 COUGH: ICD-10-CM

## 2021-10-10 DIAGNOSIS — J44.1 COPD EXACERBATION (H): Primary | ICD-10-CM

## 2021-10-10 LAB — SARS-COV-2 RNA RESP QL NAA+PROBE: NEGATIVE

## 2021-10-10 PROCEDURE — U0003 INFECTIOUS AGENT DETECTION BY NUCLEIC ACID (DNA OR RNA); SEVERE ACUTE RESPIRATORY SYNDROME CORONAVIRUS 2 (SARS-COV-2) (CORONAVIRUS DISEASE [COVID-19]), AMPLIFIED PROBE TECHNIQUE, MAKING USE OF HIGH THROUGHPUT TECHNOLOGIES AS DESCRIBED BY CMS-2020-01-R: HCPCS | Performed by: PHYSICIAN ASSISTANT

## 2021-10-10 PROCEDURE — 99214 OFFICE O/P EST MOD 30 MIN: CPT | Performed by: PHYSICIAN ASSISTANT

## 2021-10-10 PROCEDURE — U0005 INFEC AGEN DETEC AMPLI PROBE: HCPCS | Performed by: PHYSICIAN ASSISTANT

## 2021-10-10 RX ORDER — CODEINE PHOSPHATE AND GUAIFENESIN 10; 100 MG/5ML; MG/5ML
1 SOLUTION ORAL EVERY 6 HOURS PRN
Qty: 50 ML | Refills: 0 | Status: SHIPPED | OUTPATIENT
Start: 2021-10-10 | End: 2022-05-25

## 2021-10-10 RX ORDER — AZITHROMYCIN 250 MG/1
TABLET, FILM COATED ORAL
Qty: 6 TABLET | Refills: 0 | Status: SHIPPED | OUTPATIENT
Start: 2021-10-10 | End: 2021-10-15

## 2021-10-10 RX ORDER — ALBUTEROL SULFATE 90 UG/1
2 AEROSOL, METERED RESPIRATORY (INHALATION) EVERY 6 HOURS PRN
Qty: 18 G | Refills: 0 | Status: SHIPPED | OUTPATIENT
Start: 2021-10-10 | End: 2021-11-23

## 2021-10-10 RX ORDER — PREDNISONE 20 MG/1
40 TABLET ORAL DAILY
Qty: 10 TABLET | Refills: 0 | Status: SHIPPED | OUTPATIENT
Start: 2021-10-10 | End: 2021-10-15

## 2021-10-10 ASSESSMENT — MIFFLIN-ST. JEOR: SCORE: 1026.74

## 2021-10-10 NOTE — PROGRESS NOTES
"Assessment/Plan:    No acute distress or toxicity noted, afebrile, O2 sat 98%. Mild wheezing noted. Suspect COPD exacerbation. Rx Atrovent and refilled Ventolin. Also Rx azithromycin & prednisone, Robitussin AC for cough at night.     Discussed that symptoms do overlap with possible COVID-19, and patient was tested for this today. Isolate while awaiting test results.     See patient instructions below.  At the end of the encounter, I discussed results, diagnosis, medications. Discussed red flags for immediate return to clinic/ER, as well as indications for follow up if no improvement. Patient understood and agreed to plan. Patient was stable for discharge.      ICD-10-CM    1. COPD exacerbation (H)  J44.1 predniSONE (DELTASONE) 20 MG tablet     azithromycin (ZITHROMAX) 250 MG tablet     guaiFENesin-codeine (ROBITUSSIN AC) 100-10 MG/5ML solution     ipratropium (ATROVENT HFA) 17 MCG/ACT inhaler     VENTOLIN  (90 Base) MCG/ACT inhaler   2. Cough  R05.9 Symptomatic COVID-19 Virus (Coronavirus) by PCR Oropharynx         No follow-ups on file.    Roz Santamaria, ANISHA, [facility]    ------------------------------------------------------------------------------------------------------------------------------------------------------------------------  HPI:  Bailee Dixon is a 61 year old female with history of COPD who presents for evaluation of nasal congestion, sinus pressure, productive cough, and wheezing onset 10 days ago. Pt normally has a cough but it is more frequent and producing more sputum than usual. She is supposed to be on Atrovent inhaler daily for her COPD but hasn't had this for awhile; has tried her Ventolin inhaler which does provide temporary relief of wheezing. She states she gets similar symptoms \"every fall\" and her PCP usually prescribes prednisone & azithromycin which helps. Patient reports no fever/chills, myalgias, sore throat, loss of sense of taste " "or smell, headache, chest pain, shortness of breath, abdominal pain, nausea, vomiting, diarrhea, rash, or any other symptoms. No known sick contacts/COVID exposure.       Past Medical History:   Diagnosis Date     COPD exacerbation (H) 09/26/2020     Coronary artery disease      DDD (degenerative disc disease), lumbar      Hyperlipidemia      Migraine without aura, without mention of intractable migraine without mention of status migrainosus     infreq     NSTEMI (non-ST elevated myocardial infarction) (H)      Other acne     occ infected skin cysts     PAD (peripheral artery disease) (H)     severe; aortic stenosis, left iliac occlusion, right iliac stenosis     Smoking     quit as of 06/2020       Vitals:    10/10/21 1117   BP: 124/66   BP Location: Right arm   Patient Position: Chair   Cuff Size: Adult Regular   Pulse: 101   Resp: 16   Temp: 98.4  F (36.9  C)   TempSrc: Oral   SpO2: 98%   Weight: 47.2 kg (104 lb)   Height: 1.626 m (5' 4\")       Physical Exam  Vitals and nursing note reviewed.   HENT:      Right Ear: Tympanic membrane normal.      Left Ear: Tympanic membrane normal.      Mouth/Throat:      Mouth: Mucous membranes are moist.      Pharynx: Oropharynx is clear.   Cardiovascular:      Rate and Rhythm: Normal rate and regular rhythm.      Heart sounds: Normal heart sounds.   Pulmonary:      Effort: Pulmonary effort is normal. No accessory muscle usage or respiratory distress.      Breath sounds: Decreased air movement present. Wheezing (faint expiratory & inspiratory) present. No rales.   Neurological:      Mental Status: She is alert.         Labs/Imaging:  No results found for this or any previous visit (from the past 24 hour(s)).      There are no Patient Instructions on file for this visit.  "

## 2021-10-15 DIAGNOSIS — I73.9 CLAUDICATION OF RIGHT LOWER EXTREMITY (H): ICD-10-CM

## 2021-10-15 RX ORDER — CLOPIDOGREL BISULFATE 75 MG/1
75 TABLET ORAL DAILY
Qty: 90 TABLET | Refills: 3 | Status: CANCELLED | OUTPATIENT
Start: 2021-10-15

## 2021-10-15 NOTE — TELEPHONE ENCOUNTER
[facility]     Who is the name of the provider?  Dr Mazariegos    What is the location you see this provider at?   [place]    Reason for call:  Pt LM on our voicemail that she is out of her blood thinners (75mg)  and needs a refill     :  Bailee    Phone number to call:  [phone]    Additional Notes: did not say which medication(s) they are

## 2021-10-15 NOTE — TELEPHONE ENCOUNTER
LVM requesting patient contact her pharmacy for prescription refill and they will contact us with the request.  Pt did not confirm on the voicemail her medication, nor the pharmacy she would like the prescription to be sent to.  Also advised patient that refills take 2-3 business days to be sent.    Gladys Gil, MARIANNAN, RN-[facility]

## 2021-10-19 RX ORDER — CLOPIDOGREL BISULFATE 75 MG/1
75 TABLET ORAL DAILY
Qty: 90 TABLET | Refills: 0 | Status: SHIPPED | OUTPATIENT
Start: 2021-10-19 | End: 2022-01-25

## 2021-10-19 NOTE — TELEPHONE ENCOUNTER
"clopidogrel (PLAVIX) 75 MG tablet  Last Written Prescription Date:  10/13/20  Last Fill Quantity: 90,  # refills: 3     Last office visit: 5/7/2021 (Aspirin 81 mg + Plavix 75 mg + Rosuvastatin 5 mg (? Intolerance of atorvastatin). Continue all indefinitely for optimal medical management of PAD    Follow up due:  November/December 2021    Requested Prescriptions   Pending Prescriptions Disp Refills     clopidogrel (PLAVIX) 75 MG tablet 90 tablet 0     Sig: Take 1 tablet (75 mg) by mouth daily       Plavix Passed - 10/19/2021 12:57 PM        Passed - No active PPI on record unless is Protonix        Passed - Normal HGB on file in past 12 months     Recent Labs   Lab Test 12/30/20  0953   HGB 13.2               Passed - Normal Platelets on file in past 12 months     Recent Labs   Lab Test 12/30/20  0953                  Passed - Recent (12 mo) or future (30 days) visit within the authorizing provider's specialty     Patient has had an office visit with the authorizing provider or a provider within the authorizing providers department within the previous 12 mos or has a future within next 30 days. See \"Patient Info\" tab in inbasket, or \"Choose Columns\" in Meds & Orders section of the refill encounter.              Passed - Medication is active on med list        Passed - Patient is age 18 or older        Passed - No active pregnancy on record        Passed - No positive pregnancy test in past 12 months           Prescription approved per [facility] Refill Protocol.  Jessi CABRALN  [facility]  [phone]        "

## 2021-10-23 ENCOUNTER — HEALTH MAINTENANCE LETTER (OUTPATIENT)
Age: 61
End: 2021-10-23

## 2021-10-29 DIAGNOSIS — I73.9 PAD (PERIPHERAL ARTERY DISEASE) (H): ICD-10-CM

## 2021-11-01 RX ORDER — ROSUVASTATIN CALCIUM 5 MG/1
5 TABLET, COATED ORAL DAILY
Qty: 30 TABLET | Refills: 3 | Status: SHIPPED | OUTPATIENT
Start: 2021-11-01 | End: 2022-02-25

## 2021-11-01 NOTE — TELEPHONE ENCOUNTER
Prescription approved per [facility] Refill Protocol.    Cathryn MCCARTHY RN

## 2021-11-22 ENCOUNTER — MYC MEDICAL ADVICE (OUTPATIENT)
Dept: INTERNAL MEDICINE | Facility: CLINIC | Age: 61
End: 2021-11-22
Payer: COMMERCIAL

## 2021-11-22 DIAGNOSIS — J44.1 COPD EXACERBATION (H): ICD-10-CM

## 2021-11-22 DIAGNOSIS — J98.01 ACUTE BRONCHOSPASM: ICD-10-CM

## 2021-11-23 RX ORDER — MOMETASONE FUROATE AND FORMOTEROL FUMARATE DIHYDRATE 100; 5 UG/1; UG/1
AEROSOL RESPIRATORY (INHALATION)
Qty: 13 G | Refills: 5 | Status: SHIPPED | OUTPATIENT
Start: 2021-11-23 | End: 2022-05-25

## 2021-11-23 RX ORDER — ALBUTEROL SULFATE 90 UG/1
2 AEROSOL, METERED RESPIRATORY (INHALATION) EVERY 6 HOURS PRN
Qty: 18 G | Refills: 5 | Status: SHIPPED | OUTPATIENT
Start: 2021-11-23 | End: 2022-03-15

## 2021-11-23 NOTE — TELEPHONE ENCOUNTER
See [employer] message.     Last OV on 5/14/21    Provider approval needed.

## 2022-01-18 DIAGNOSIS — I73.9 CLAUDICATION OF RIGHT LOWER EXTREMITY (H): ICD-10-CM

## 2022-01-19 RX ORDER — CLOPIDOGREL BISULFATE 75 MG/1
75 TABLET ORAL DAILY
Qty: 90 TABLET | Refills: 0 | OUTPATIENT
Start: 2022-01-19

## 2022-01-19 NOTE — TELEPHONE ENCOUNTER
This provider is not here. Pharmacy routed to [facility], but this is for a specialty clinic, not for primary care.  Unsure where to route so refusing to pharmacy.  Viktoria Powers RN  [facility] RN Triage Team

## 2022-01-24 DIAGNOSIS — I73.9 CLAUDICATION OF RIGHT LOWER EXTREMITY (H): ICD-10-CM

## 2022-01-24 NOTE — TELEPHONE ENCOUNTER
Med and pharm loaded.    Cristel CABRALN, RN    [facility]  Vascular [facility]  Office: [phone]  Fax: [phone]

## 2022-01-24 NOTE — TELEPHONE ENCOUNTER
[facility]    Who is the name of the provider?:  Dr. Mazariegos      What is the location you see this provider at?: [place]    Can we leave a detailed message on this number? Yes    Reason for call:  Refill needed for Rx Clopidogrel (Plavix).     Pharmacy confirmation: [facility] in [place].      Catrina Walls    [facility]  Vascular [facility]   [phone]

## 2022-01-25 RX ORDER — CLOPIDOGREL BISULFATE 75 MG/1
75 TABLET ORAL DAILY
Qty: 90 TABLET | Refills: 0 | Status: SHIPPED | OUTPATIENT
Start: 2022-01-25 | End: 2022-04-26

## 2022-02-19 ENCOUNTER — OFFICE VISIT (OUTPATIENT)
Dept: URGENT CARE | Facility: URGENT CARE | Age: 62
End: 2022-02-19
Payer: COMMERCIAL

## 2022-02-19 VITALS
WEIGHT: 107 LBS | DIASTOLIC BLOOD PRESSURE: 64 MMHG | SYSTOLIC BLOOD PRESSURE: 124 MMHG | TEMPERATURE: 98 F | BODY MASS INDEX: 18.37 KG/M2 | RESPIRATION RATE: 14 BRPM | HEART RATE: 100 BPM | OXYGEN SATURATION: 98 %

## 2022-02-19 DIAGNOSIS — R05.8 PRODUCTIVE COUGH: Primary | ICD-10-CM

## 2022-02-19 PROCEDURE — 99213 OFFICE O/P EST LOW 20 MIN: CPT | Performed by: PHYSICIAN ASSISTANT

## 2022-02-19 RX ORDER — AZITHROMYCIN 250 MG/1
TABLET, FILM COATED ORAL
Qty: 6 TABLET | Refills: 0 | Status: SHIPPED | OUTPATIENT
Start: 2022-02-19 | End: 2022-02-24

## 2022-02-19 RX ORDER — PREDNISONE 20 MG/1
40 TABLET ORAL DAILY
Qty: 10 TABLET | Refills: 0 | Status: SHIPPED | OUTPATIENT
Start: 2022-02-19 | End: 2022-02-24

## 2022-02-19 NOTE — PATIENT INSTRUCTIONS
Patient Education     Viral or Bacterial Bronchitis with Wheezing (Adult)    Bronchitis is an infection of the air passages. It often occurs during a cold and is usually caused by a virus. Symptoms include cough with mucus (phlegm) and low-grade fever. This illness is contagious during the first few days and is spread through the air by coughing and sneezing, or by direct contact (touching the sick person and then touching your own eyes, nose, or mouth).  If there is a lot of inflammation, air flow is restricted. The air passages may also go into spasm, especially if you have asthma. This causes wheezing and difficulty breathing even in people who do not have asthma.  Bronchitis usually lasts 7 to 14 days. The wheezing should improve with treatment during the first week. An inhaler is often prescribed to relax the air passages and stop wheezing. Antibiotics will be prescribed if your doctor thinks there is also a secondary bacterial infection.  Home care    If symptoms are severe, rest at home for the first 2 to 3 days. When you go back to your usual activities, don't let yourself get too tired.    Dont s'moke. Also avoid being exposed to secondhand smoke.    You may use over-the-counter medicine to control fever or pain, unless another medicine was prescribed. Note: If you have chronic liver or kidney disease or have ever had a stomach ulcer or gastrointestinal bleeding, talk with your healthcare provider before using these medicines. Also talk to your provider if you are taking medicine to prevent blood clots.) Aspirin should never be given to anyone younger than 18 years of age who is ill with a viral infection or fever. It may cause severe liver or brain damage.    Your appetite may be poor, so a light diet is fine. Stay well hydrated by drinking 6 to 8 glasses of fluids per day (such as water, soft drinks, sports drinks, juices, tea, or soup). Extra fluids will help loosen secretions in the nose and  lungs.    Over-the-counter cough, cold, and sore-throat medicines will not shorten the length of the illness, but they may be helpful to reduce symptoms. (Note: Don't use decongestants if you have high blood pressure.)    If you were given an inhaler, use it exactly as directed. If you need to use it more often than prescribed, your condition may be worsening. If this happens, contact your healthcare provider.    If prescribed, finish all antibiotic medicine, even if you are feeling better after only a few days.  Follow-up care  Follow up with your healthcare provider, or as advised. If you had an X-ray or ECG (electrocardiogram), a specialist will review it. You will be notified of any new findings that may affect your care.  If you are age 65 or older, or if you have a chronic lung disease or condition that affects your immune system, or you smoke, ask your healthcare provider about getting a pneumococcal vaccine and a yearly flu shot (influenza vaccine).  When to seek medical advice  Call your healthcare provider right away if any of these occur:    Fever of 100.4 F (38 C) or higher, or as directed by your healthcare provider    Coughing up increasing amounts of colored sputum    Weakness, drowsiness, headache, facial pain, ear pain, or a stiff neck  Call 911  Call 911 if any of these occur.    Coughing up blood    Worsening weakness, drowsiness, headache, or stiff neck    Increased wheezing not helped with medication, shortness of breath, or pain with breathing  [employer] last reviewed this educational content on 6/1/2018 2000-2021 [employer]. All rights reserved. This information is not intended as a substitute for professional medical care. Always follow your healthcare professional's instructions.

## 2022-02-19 NOTE — PROGRESS NOTES
Assessment & Plan     Productive cough  Ongoing symptoms for the past 3 weeks.  Patient has COPD at baseline.  On exam no respiratory distress.  She is afebrile.  Her oxygen saturation is 98% on room air.  Zithromax is prescribed today.  Prednisone is also prescribed.  May use Ventolin inhaler as needed for shortness of breath.  Patient is in the process of regaining her insurance back and hopefully will be able to fill her Dulera in the next couple weeks.  We discussed red flag symptoms and when to seek emergent care.  Patient agrees with the plan.  - azithromycin (ZITHROMAX) 250 MG tablet  Dispense: 6 tablet; Refill: 0  - predniSONE (DELTASONE) 20 MG tablet  Dispense: 10 tablet; Refill: 0         Return in about 1 week (around 2/26/2022) for Symptoms failing to improve.    Edwina Murray PA-C  [facility] URG[facility] C[facility] [place] is a 61 year old female who presents to clinic today for the following health issues:  Chief Complaint   Patient presents with     Urgent Care     Cough     chest cough 3 weeks, productive- no fever      HPI      URI Adult    Onset of symptoms was 3 week(s) ago.  Course of illness is worsening.    Severity moderate  Current and Associated symptoms: cough - productive, wheezing, sob  She has sob at baseline. No CP. No fever/chills. No v/d.  Treatment measures tried include ventolin inhaler--helps some.  Predisposing factors include HX of COPD.  Patient is supposed to be on Dulera inhaler but has not been on it due to lack of insurance.  Patient reports a negative home COVID test 2 days ago.        Review of Systems  Constitutional, HEENT, cardiovascular, pulmonary, GI, , musculoskeletal, neuro, skin, endocrine and psych systems are negative, except as otherwise noted.      Objective    /64 (BP Location: Right arm)   Pulse 100   Temp 98  F (36.7  C) (Tympanic)   Resp 14   Wt 48.5 kg (107 lb)   SpO2 98%   BMI 18.37 kg/m    Physical Exam   GENERAL:  healthy, alert and no distress  HENT: ear canals and TM's normal,  mouth without ulcers or lesions  RESP: lungs with decreased air movement, mild expiratory and inspiratory wheezing.  No rales  CV: regular rate and rhythm, normal S1 S2  MS: no gross musculoskeletal defects noted, no edema  SKIN: no suspicious lesions or rashes

## 2022-02-25 DIAGNOSIS — I73.9 PAD (PERIPHERAL ARTERY DISEASE) (H): ICD-10-CM

## 2022-02-25 RX ORDER — ROSUVASTATIN CALCIUM 5 MG/1
5 TABLET, COATED ORAL DAILY
Qty: 30 TABLET | Refills: 3 | Status: SHIPPED | OUTPATIENT
Start: 2022-02-25 | End: 2023-03-30

## 2022-02-25 NOTE — TELEPHONE ENCOUNTER
Pending Prescriptions:                       Disp   Refills    rosuvastatin (CRESTOR) 5 MG tablet [Pharma*30 tab*3        Sig: Take 1 tablet (5 mg) by mouth daily  Routing refill request to provider for review/approval because:  Fails protocol

## 2022-03-16 ENCOUNTER — TELEPHONE (OUTPATIENT)
Dept: INTERNAL MEDICINE | Facility: CLINIC | Age: 62
End: 2022-03-16
Payer: COMMERCIAL

## 2022-03-16 NOTE — TELEPHONE ENCOUNTER
Ventolin brand name requires Prior Authorization.  Order has ROBERTA to dispense brand, however, new insurance requires PA, please advise, thanks!    Marnie Johnson, PharmD    [facility]  Phone:  [phone]  Fax:  [phone]

## 2022-03-16 NOTE — TELEPHONE ENCOUNTER
Prior Authorization Retail Medication Request    Medication/Dose: Ventolin brand name requires Prior Authorization  ICD code (if different than what is on RX):  J44.1  Previously Tried and Failed:  Yes  Rationale:  Tried Albuterol which caused coughing and does not work as well as Ventolin    Insurance Name:  RAJAT  Insurance ID:  [id number]      Pharmacy Information (if different than what is on RX)  [place]  [facility] [place] [address]    Phone:  [phone]      Marine ESTRADA RN   [facility]

## 2022-03-16 NOTE — TELEPHONE ENCOUNTER
"Call to patient. Patient informed of Dr. Singh's below response.   Patient states: \"I have tried that before and it didn't work. It makes me cough. My chest just doesn't lighten up like it does with the Ventolin.\"     Please advise.     Marine ESTRADA RN   [facility]    "

## 2022-03-22 NOTE — TELEPHONE ENCOUNTER
Central Prior Authorization Team   Phone: [phone]    PA Initiation    Medication: Ventolin brand name requires Prior Authorization  Insurance Company: [employer] - Phone [phone] Fax [phone]  Pharmacy Filling the Rx: [facility] [place], [place] - [address] [address]  Filling Pharmacy Phone: [phone]  Filling Pharmacy Fax:    Start Date: 3/22/2022

## 2022-03-24 NOTE — TELEPHONE ENCOUNTER
Prior Authorization Not Needed per Insurance    Medication: Ventolin brand name requires Prior Authorization  Insurance Company: [employer] - Phone [phone] Fax [phone]  Expected CoPay:      Pharmacy Filling the Rx: [facility] [address] [facility]  Pharmacy Notified: Yes  Patient Notified: Yes  **Instructed pharmacy to notify patient when script is ready to /ship.**

## 2022-04-02 DIAGNOSIS — J44.1 COPD EXACERBATION (H): ICD-10-CM

## 2022-04-05 RX ORDER — ALBUTEROL SULFATE 90 UG/1
AEROSOL, METERED RESPIRATORY (INHALATION)
Qty: 18 G | Refills: 0 | Status: SHIPPED | OUTPATIENT
Start: 2022-04-05 | End: 2022-04-22

## 2022-04-05 NOTE — TELEPHONE ENCOUNTER
Medication is being filled for 1 time refill only due to:  Patient needs to be seen because pt is due for annual visit in May.  Ramila FLOREZ RN

## 2022-04-09 ENCOUNTER — HEALTH MAINTENANCE LETTER (OUTPATIENT)
Age: 62
End: 2022-04-09

## 2022-04-16 DIAGNOSIS — J44.1 COPD EXACERBATION (H): ICD-10-CM

## 2022-04-18 RX ORDER — ALBUTEROL SULFATE 90 UG/1
AEROSOL, METERED RESPIRATORY (INHALATION)
Qty: 18 G | Refills: 0
Start: 2022-04-18

## 2022-04-26 ENCOUNTER — TELEPHONE (OUTPATIENT)
Dept: INTERNAL MEDICINE | Facility: CLINIC | Age: 62
End: 2022-04-26
Payer: COMMERCIAL

## 2022-04-26 DIAGNOSIS — I73.9 CLAUDICATION OF RIGHT LOWER EXTREMITY (H): ICD-10-CM

## 2022-04-26 RX ORDER — CLOPIDOGREL BISULFATE 75 MG/1
75 TABLET ORAL DAILY
Qty: 90 TABLET | Refills: 0 | Status: SHIPPED | OUTPATIENT
Start: 2022-04-26 | End: 2023-03-30

## 2022-04-26 NOTE — TELEPHONE ENCOUNTER
Prior Authorization Approval    Authorization Effective Date: 4/26/2022  Authorization Expiration Date: 4/26/2023  Medication: VENTOLIN  (90 Base) MCG/ACT inhaler  Approved Dose/Quantity: 1  Reference #: [id number]   Insurance Company: [employer] - Phone [phone] Fax [phone]   Which Pharmacy is filling the prescription (Not needed for infusion/clinic administered): [facility] [place], [place] - [address] [facility]  Pharmacy Notified: Yes **Instructed pharmacy to notify patient when script is ready to /ship.**  Patient Notified: Yes

## 2022-04-26 NOTE — TELEPHONE ENCOUNTER
PA Initiation    Medication: VENTOLIN  (90 Base) MCG/ACT inhaler  Insurance Company: [email or web address] - Phone [phone] Fax [phone]  [place] [facility] [place] [address] [place] [address]  Filling Pharmacy Phone: [phone]  Filling Pharmacy Fax:    Start Date: 4/26/2022    Central Prior Authorization Team   Phone: [phone]

## 2022-04-26 NOTE — TELEPHONE ENCOUNTER
clopidogrel (PLAVIX) 75 MG tablet  Last Written Prescription Date:  1/25/22  Last Fill Quantity: 90,  # refills: 0    Last office visit:  5/7/21  Follow up recommended:  November 2021 for repeat ABIs and office visit    90 day refill loaded for review/signature.  Patient notified via [employer] that appointment is required for further refill.  Follow up orders updated in Epic.    Jessi Baca RN BSN  [facility]  [phone]

## 2022-04-26 NOTE — TELEPHONE ENCOUNTER
Sheryl from [facility] called with pa outcome.  PA approved starting 4/26/22-4/26/2023.

## 2022-05-23 ASSESSMENT — ENCOUNTER SYMPTOMS
DIZZINESS: 0
NAUSEA: 0
BREAST MASS: 0
WEAKNESS: 1
MYALGIAS: 1
NERVOUS/ANXIOUS: 0
SHORTNESS OF BREATH: 0
JOINT SWELLING: 0
FREQUENCY: 0
SORE THROAT: 0
ABDOMINAL PAIN: 0
PALPITATIONS: 0
HEADACHES: 0
EYE PAIN: 0
HEMATURIA: 0
HEMATOCHEZIA: 0
ARTHRALGIAS: 1
PARESTHESIAS: 0
HEARTBURN: 0
CONSTIPATION: 0
DYSURIA: 0
COUGH: 1
FEVER: 0
DIARRHEA: 0
CHILLS: 0

## 2022-05-25 ENCOUNTER — OFFICE VISIT (OUTPATIENT)
Dept: INTERNAL MEDICINE | Facility: CLINIC | Age: 62
End: 2022-05-25
Payer: COMMERCIAL

## 2022-05-25 VITALS
DIASTOLIC BLOOD PRESSURE: 70 MMHG | HEIGHT: 64 IN | SYSTOLIC BLOOD PRESSURE: 124 MMHG | TEMPERATURE: 98.8 F | WEIGHT: 99.38 LBS | BODY MASS INDEX: 16.97 KG/M2 | OXYGEN SATURATION: 97 % | HEART RATE: 100 BPM

## 2022-05-25 DIAGNOSIS — Z78.0 MENOPAUSE: ICD-10-CM

## 2022-05-25 DIAGNOSIS — J98.01 ACUTE BRONCHOSPASM: ICD-10-CM

## 2022-05-25 DIAGNOSIS — Z12.31 ENCOUNTER FOR SCREENING MAMMOGRAM FOR BREAST CANCER: ICD-10-CM

## 2022-05-25 DIAGNOSIS — Z00.00 ENCOUNTER FOR PREVENTATIVE ADULT HEALTH CARE EXAMINATION: Primary | ICD-10-CM

## 2022-05-25 DIAGNOSIS — Z79.899 ENCOUNTER FOR LONG-TERM (CURRENT) USE OF MEDICATIONS: ICD-10-CM

## 2022-05-25 DIAGNOSIS — Z12.11 COLON CANCER SCREENING: ICD-10-CM

## 2022-05-25 DIAGNOSIS — J44.1 COPD EXACERBATION (H): ICD-10-CM

## 2022-05-25 DIAGNOSIS — Z12.31 VISIT FOR SCREENING MAMMOGRAM: ICD-10-CM

## 2022-05-25 DIAGNOSIS — Z11.59 NEED FOR HEPATITIS C SCREENING TEST: ICD-10-CM

## 2022-05-25 DIAGNOSIS — Z11.4 SCREENING FOR HIV (HUMAN IMMUNODEFICIENCY VIRUS): ICD-10-CM

## 2022-05-25 PROCEDURE — 99396 PREV VISIT EST AGE 40-64: CPT | Performed by: INTERNAL MEDICINE

## 2022-05-25 PROCEDURE — 80053 COMPREHEN METABOLIC PANEL: CPT | Performed by: INTERNAL MEDICINE

## 2022-05-25 PROCEDURE — 36415 COLL VENOUS BLD VENIPUNCTURE: CPT | Performed by: INTERNAL MEDICINE

## 2022-05-25 PROCEDURE — 80061 LIPID PANEL: CPT | Performed by: INTERNAL MEDICINE

## 2022-05-25 PROCEDURE — 84443 ASSAY THYROID STIM HORMONE: CPT | Performed by: INTERNAL MEDICINE

## 2022-05-25 RX ORDER — ALBUTEROL SULFATE 90 UG/1
2 AEROSOL, METERED RESPIRATORY (INHALATION) EVERY 6 HOURS PRN
Qty: 18 G | Refills: 11 | Status: SHIPPED | OUTPATIENT
Start: 2022-05-25 | End: 2023-01-27

## 2022-05-25 RX ORDER — MOMETASONE FUROATE AND FORMOTEROL FUMARATE DIHYDRATE 100; 5 UG/1; UG/1
AEROSOL RESPIRATORY (INHALATION)
Qty: 13 G | Refills: 5 | Status: SHIPPED | OUTPATIENT
Start: 2022-05-25 | End: 2022-12-19

## 2022-05-25 ASSESSMENT — ENCOUNTER SYMPTOMS
DIZZINESS: 0
NERVOUS/ANXIOUS: 0
HEMATURIA: 0
FEVER: 0
ABDOMINAL PAIN: 0
ARTHRALGIAS: 1
WEAKNESS: 1
BREAST MASS: 0
MYALGIAS: 1
DYSURIA: 0
DIARRHEA: 0
CHILLS: 0
PALPITATIONS: 0
CONSTIPATION: 0
SORE THROAT: 0
COUGH: 1
HEADACHES: 0
FREQUENCY: 0
SHORTNESS OF BREATH: 0
JOINT SWELLING: 0
EYE PAIN: 0
NAUSEA: 0
HEARTBURN: 0
PARESTHESIAS: 0
HEMATOCHEZIA: 0

## 2022-05-25 NOTE — PROGRESS NOTES
SUBJECTIVE:   CC: Bailee Dixon is an 62 year old woman who presents for preventive health visit.       Patient has been advised of split billing requirements and indicates understanding: Yes  Healthy Habits:     Getting at least 3 servings of Calcium per day:  Yes    Bi-annual eye exam:  Yes    Dental care twice a year:  NO    Sleep apnea or symptoms of sleep apnea:  Sleep apnea    Diet:  Regular (no restrictions)    Frequency of exercise:  4-5 days/week    Duration of exercise:  15-30 minutes    Taking medications regularly:  Yes    Medication side effects:  Muscle aches    PHQ-2 Total Score: 0    Additional concerns today:  No          PROBLEMS TO ADD ON...  Has h/o COPD. On Dulera and Albuterol. No increased cough, SOB, wheezing.   Has quit smoking.   Has h/o MI. Follows with cardiology. On Plavix, Aspirin and Statin. No chest pain.     Today's PHQ-2 Score:   PHQ-2 ( 1999 Pfizer) 5/25/2022   Q1: Little interest or pleasure in doing things 0   Q2: Feeling down, depressed or hopeless 0   PHQ-2 Score 0   PHQ-2 Total Score (12-17 Years)- Positive if 3 or more points; Administer PHQ-A if positive -   Q1: Little interest or pleasure in doing things Not at all   Q2: Feeling down, depressed or hopeless Not at all   PHQ-2 Score 0       Abuse: Current or Past (Physical, Sexual or Emotional) - No  Do you feel safe in your environment? Yes    Have you ever done Advance Care Planning? (For example, a Health Directive, POLST, or a discussion with a medical provider or your loved ones about your wishes): Yes, patient states has an Advance Care Planning document and will bring a copy to the clinic.    Social History     Tobacco Use     Smoking status: Former Smoker     Packs/day: 1.00     Years: 21.00     Pack years: 21.00     Types: Cigarettes     Quit date: 10/17/2011     Years since quitting: 10.6     Smokeless tobacco: Never Used     Tobacco comment: started 1990 per pt, occasionally now   Substance Use Topics      Alcohol use: Yes     Alcohol/week: 0.0 standard drinks     Comment: 2-3 drinks weekly     If you drink alcohol do you typically have >3 drinks per day or >7 drinks per week? No    Alcohol Use 5/25/2022   Prescreen: >3 drinks/day or >7 drinks/week? -   Prescreen: >3 drinks/day or >7 drinks/week? No       Reviewed orders with patient.  Reviewed health maintenance and updated orders accordingly - Yes  Lab work is in process  Labs reviewed in EPIC    Breast Cancer Screening:    Breast CA Risk Assessment (FHS-7) 5/23/2022   Do you have a family history of breast, colon, or ovarian cancer? No / Unknown       click delete button to remove this line now  Mammogram Screening: Recommended mammography every 1-2 years with patient discussion and risk factor consideration  Pertinent mammograms are reviewed under the imaging tab.    History of abnormal Pap smear: Status post benign hysterectomy. Health Maintenance and Surgical History updated.     Reviewed and updated as needed this visit by clinical staff   Tobacco  Allergies  Meds  Problems  Med Hx  Surg Hx  Fam Hx  Soc   Hx          Reviewed and updated as needed this visit by Provider   Tobacco  Allergies  Meds  Problems  Med Hx  Surg Hx  Fam Hx               Review of Systems   Constitutional: Negative for chills and fever.   HENT: Positive for congestion. Negative for ear pain, hearing loss and sore throat.    Eyes: Negative for pain and visual disturbance.   Respiratory: Positive for cough. Negative for shortness of breath.    Cardiovascular: Negative for chest pain, palpitations and peripheral edema.   Gastrointestinal: Negative for abdominal pain, constipation, diarrhea, heartburn, hematochezia and nausea.   Breasts:  Negative for tenderness, breast mass and discharge.   Genitourinary: Negative for dysuria, frequency, genital sores, hematuria, pelvic pain, urgency, vaginal bleeding and vaginal discharge.   Musculoskeletal: Positive for arthralgias and  "myalgias. Negative for joint swelling.   Skin: Negative for rash.   Neurological: Positive for weakness. Negative for dizziness, headaches and paresthesias.   Psychiatric/Behavioral: Negative for mood changes. The patient is not nervous/anxious.       OBJECTIVE:   /70 (BP Location: Right arm, Patient Position: Sitting, Cuff Size: Adult Regular)   Pulse 100   Temp 98.8  F (37.1  C) (Oral)   Ht 1.626 m (5' 4\")   Wt 45.1 kg (99 lb 6 oz)   SpO2 97%   BMI 17.06 kg/m    Physical Exam  GENERAL: healthy, alert and no distress  EYES: Eyes grossly normal to inspection, PERRL and conjunctivae and sclerae normal  HENT: ear canals and TM's normal, nose and mouth without ulcers or lesions  NECK: no adenopathy, no asymmetry, masses, or scars and thyroid normal to palpation  RESP: lungs clear to auscultation - no rales, rhonchi or wheezes  CV: regular rate and rhythm, normal S1 S2, no S3 or S4, no murmur, click or rub, no peripheral edema and peripheral pulses strong  ABDOMEN: soft, nontender, no hepatosplenomegaly, no masses and bowel sounds normal  MS: no gross musculoskeletal defects noted, no edema  SKIN: no suspicious lesions or rashes  NEURO: Normal strength and tone, mentation intact and speech normal  PSYCH: mentation appears normal, affect normal/bright    Diagnostic Test Results:  Labs reviewed in Epic    ASSESSMENT/PLAN:       ICD-10-CM    1. Encounter for preventative adult health care examination  Z00.00 Lipid panel reflex to direct LDL Non-fasting     Comprehensive metabolic panel (BMP + Alb, Alk Phos, ALT, AST, Total. Bili, TP)     TSH with free T4 reflex   2. Encounter for long-term (current) use of medications  Z79.899    3. Screening for HIV (human immunodeficiency virus)  Z11.4    4. Need for hepatitis C screening test  Z11.59    5. Visit for screening mammogram  Z12.31    6. Acute bronchospasm  J98.01 mometasone-formoterol (DULERA) 100-5 MCG/ACT inhaler   7. COPD exacerbation (H)  J44.1 VENTOLIN HFA " "108 (90 Base) MCG/ACT inhaler   8. Encounter for screening mammogram for breast cancer  Z12.31 *MA Screening Digital Bilateral   9. Colon cancer screening  Z12.11 COLOGUARD(EXACT SCIENCES)   10. Menopause  Z78.0 DX Hip/Pelvis/Spine       Patient has been advised of split billing requirements and indicates understanding: Yes    COUNSELING:  Reviewed preventive health counseling, as reflected in patient instructions       Regular exercise       Healthy diet/nutrition       Vision screening       Hearing screening    Estimated body mass index is 17.06 kg/m  as calculated from the following:    Height as of this encounter: 1.626 m (5' 4\").    Weight as of this encounter: 45.1 kg (99 lb 6 oz).    Weight management plan noted, stable and monitoring    She reports that she quit smoking about 10 years ago. Her smoking use included cigarettes. She has a 21.00 pack-year smoking history. She has never used smokeless tobacco.      Counseling Resources:  ATP IV Guidelines  Pooled Cohorts Equation Calculator  Breast Cancer Risk Calculator  BRCA-Related Cancer Risk Assessment: FHS-7 Tool  FRAX Risk Assessment  ICSI Preventive Guidelines  Dietary Guidelines for Americans, 2010  USDA's MyPlate  ASA Prophylaxis  Lung CA Screening    Robert Singh MD  [facility]  "

## 2022-05-26 LAB
ALBUMIN SERPL-MCNC: 4.4 G/DL (ref 3.4–5)
ALP SERPL-CCNC: 189 U/L (ref 40–150)
ALT SERPL W P-5'-P-CCNC: 24 U/L (ref 0–50)
ANION GAP SERPL CALCULATED.3IONS-SCNC: 5 MMOL/L (ref 3–14)
AST SERPL W P-5'-P-CCNC: 14 U/L (ref 0–45)
BILIRUB SERPL-MCNC: 0.2 MG/DL (ref 0.2–1.3)
BUN SERPL-MCNC: 7 MG/DL (ref 7–30)
CALCIUM SERPL-MCNC: 9.2 MG/DL (ref 8.5–10.1)
CHLORIDE BLD-SCNC: 101 MMOL/L (ref 94–109)
CHOLEST SERPL-MCNC: 189 MG/DL
CO2 SERPL-SCNC: 27 MMOL/L (ref 20–32)
CREAT SERPL-MCNC: 0.5 MG/DL (ref 0.52–1.04)
FASTING STATUS PATIENT QL REPORTED: NO
GFR SERPL CREATININE-BSD FRML MDRD: >90 ML/MIN/1.73M2
GLUCOSE BLD-MCNC: 99 MG/DL (ref 70–99)
HDLC SERPL-MCNC: 118 MG/DL
LDLC SERPL CALC-MCNC: 60 MG/DL
NONHDLC SERPL-MCNC: 71 MG/DL
POTASSIUM BLD-SCNC: 4.5 MMOL/L (ref 3.4–5.3)
PROT SERPL-MCNC: 7.6 G/DL (ref 6.8–8.8)
SODIUM SERPL-SCNC: 133 MMOL/L (ref 133–144)
TRIGL SERPL-MCNC: 56 MG/DL
TSH SERPL DL<=0.005 MIU/L-ACNC: 2.03 MU/L (ref 0.4–4)

## 2022-06-14 ENCOUNTER — TRANSFERRED RECORDS (OUTPATIENT)
Dept: HEALTH INFORMATION MANAGEMENT | Facility: CLINIC | Age: 62
End: 2022-06-14

## 2022-06-17 ENCOUNTER — HOSPITAL ENCOUNTER (OUTPATIENT)
Dept: MAMMOGRAPHY | Facility: CLINIC | Age: 62
Discharge: HOME | End: 2022-06-17
Attending: INTERNAL MEDICINE | Admitting: INTERNAL MEDICINE
Payer: COMMERCIAL

## 2022-06-17 DIAGNOSIS — Z12.31 ENCOUNTER FOR SCREENING MAMMOGRAM FOR BREAST CANCER: ICD-10-CM

## 2022-06-17 PROCEDURE — 77067 SCR MAMMO BI INCL CAD: CPT

## 2022-06-22 ENCOUNTER — HOSPITAL ENCOUNTER (OUTPATIENT)
Dept: MAMMOGRAPHY | Facility: CLINIC | Age: 62
Discharge: HOME | End: 2022-06-22
Attending: INTERNAL MEDICINE
Payer: COMMERCIAL

## 2022-06-22 DIAGNOSIS — R92.8 ABNORMAL MAMMOGRAM: ICD-10-CM

## 2022-06-22 PROCEDURE — 77061 BREAST TOMOSYNTHESIS UNI: CPT | Mod: RT

## 2022-08-02 ENCOUNTER — MYC MEDICAL ADVICE (OUTPATIENT)
Dept: INTERNAL MEDICINE | Facility: CLINIC | Age: 62
End: 2022-08-02

## 2022-08-02 DIAGNOSIS — B37.0 ORAL THRUSH: ICD-10-CM

## 2022-08-02 RX ORDER — FLUCONAZOLE 100 MG/1
TABLET ORAL
Qty: 10 TABLET | Refills: 0 | Status: SHIPPED | OUTPATIENT
Start: 2022-08-02 | End: 2023-02-01

## 2022-10-05 ENCOUNTER — OFFICE VISIT (OUTPATIENT)
Dept: INTERNAL MEDICINE | Facility: CLINIC | Age: 62
End: 2022-10-05
Payer: COMMERCIAL

## 2022-10-05 VITALS
HEART RATE: 108 BPM | BODY MASS INDEX: 16.7 KG/M2 | DIASTOLIC BLOOD PRESSURE: 70 MMHG | SYSTOLIC BLOOD PRESSURE: 154 MMHG | TEMPERATURE: 99.2 F | RESPIRATION RATE: 16 BRPM | HEIGHT: 64 IN | WEIGHT: 97.8 LBS | OXYGEN SATURATION: 99 %

## 2022-10-05 DIAGNOSIS — J44.9 CHRONIC OBSTRUCTIVE PULMONARY DISEASE, UNSPECIFIED COPD TYPE (H): Primary | ICD-10-CM

## 2022-10-05 PROCEDURE — 99213 OFFICE O/P EST LOW 20 MIN: CPT | Performed by: NURSE PRACTITIONER

## 2022-10-05 RX ORDER — AZITHROMYCIN 250 MG/1
TABLET, FILM COATED ORAL
Qty: 6 TABLET | Refills: 0 | Status: SHIPPED | OUTPATIENT
Start: 2022-10-05 | End: 2023-03-30

## 2022-10-05 RX ORDER — METHYLPREDNISOLONE 4 MG
TABLET, DOSE PACK ORAL
Qty: 21 TABLET | Refills: 0 | Status: SHIPPED | OUTPATIENT
Start: 2022-10-05 | End: 2023-03-30

## 2022-10-05 RX ORDER — BENZONATATE 200 MG/1
200 CAPSULE ORAL 3 TIMES DAILY PRN
Qty: 30 CAPSULE | Refills: 0 | Status: SHIPPED | OUTPATIENT
Start: 2022-10-05 | End: 2023-03-30

## 2022-10-05 NOTE — PROGRESS NOTES
Assessment & Plan     Chronic obstructive pulmonary disease, unspecified COPD type (H)    - azithromycin (ZITHROMAX) 250 MG tablet; Two tablets first day, then one tablet daily for four days.  - methylPREDNISolone (MEDROL DOSEPAK) 4 MG tablet therapy pack; Follow Package Directions  - benzonatate (TESSALON) 200 MG capsule; Take 1 capsule (200 mg) by mouth 3 times daily as needed for cough  - Adult Pulmonary Medicine Referral; Future  - Adult Pulmonary Medicine Referral; Future    Push fluids, rest prn  F/u pulmonary for further evaluation and management             Urvashi Couch NP  [facility] DYLAN Morocho is a 62 year old, presenting for the following health issues:  URI (Cough, congestion ,runny nose headaches ,SOB , tested negative yesterday for covid.)      URI    History of Present Illness       Reason for visit:  Cold  Symptom onset:  3-7 days ago  Symptoms include:  Shortness of breath, coughing, sleep loss  Symptom intensity:  Severe  Symptom progression:  Worsening  Had these symptoms before:  Yes  Has tried/received treatment for these symptoms:  Yes  Previous treatment was successful:  Yes  Prior treatment description:  Z pack, predizone, cough meds  What makes it worse:  N/A  What makes it better:  Meds    She eats 2-3 servings of fruits and vegetables daily.She consumes 3 sweetened beverage(s) daily.She exercises with enough effort to increase her heart rate 10 to 19 minutes per day.  She exercises with enough effort to increase her heart rate 4 days per week.   She is taking medications regularly.       COPD Follow-Up    Overall, how are your COPD symptoms since your last clinic visit?  Slightly worse    How much fatigue or shortness of breath do you have when you are walking?  Same as usual    How much shortness of breath do you have when you are resting?  Same as usual    How often do you cough? Often    Have you noticed any change in your sputum/phlegm?   "No    Have you experienced a recent fever? No    Please describe how far you can walk without stopping to rest:  The length of 3-5 rooms    How many flights of stairs are you able to walk up without stopping?  2    Have you had any Emergency Room Visits, Urgent Care Visits, or Hospital Admissions because of your COPD since your last office visit?  No    History   Smoking Status     Former Smoker     Packs/day: 1.00     Years: 21.00     Types: Cigarettes     Quit date: 10/17/2011   Smokeless Tobacco     Never Used     Comment: started 1990 per pt, occasionally now     No results found for: FEV1, OTW1GUK        Review of Systems   CONSTITUTIONAL: NEGATIVE for fever, chills, change in weight  RESP:POSITIVE for SOB/dyspnea and wheezing  CV: NEGATIVE for chest pain, palpitations or peripheral edema      Objective    BP (!) 154/70   Pulse 108   Temp 99.2  F (37.3  C) (Tympanic)   Resp 16   Ht 1.626 m (5' 4\")   Wt 44.4 kg (97 lb 12.8 oz)   LMP  (LMP Unknown)   SpO2 99%   BMI 16.79 kg/m    Body mass index is 16.79 kg/m .  Physical Exam   GENERAL: alert, no distress and underweight  EYES: Eyes grossly normal to inspection, PERRL and conjunctivae and sclerae normal  NECK: no adenopathy, no asymmetry, masses, or scars and thyroid normal to palpation  RESP: no wheezes and rhonchi bilateral  CV: regular rate and rhythm, normal S1 S2, no S3 or S4, no murmur, click or rub, no peripheral edema and peripheral pulses strong                    "

## 2022-10-10 ENCOUNTER — HEALTH MAINTENANCE LETTER (OUTPATIENT)
Age: 62
End: 2022-10-10

## 2022-12-16 DIAGNOSIS — J98.01 ACUTE BRONCHOSPASM: ICD-10-CM

## 2022-12-19 RX ORDER — MOMETASONE FUROATE AND FORMOTEROL FUMARATE DIHYDRATE 100; 5 UG/1; UG/1
AEROSOL RESPIRATORY (INHALATION)
Qty: 13 G | Refills: 5 | Status: SHIPPED | OUTPATIENT
Start: 2022-12-19 | End: 2023-04-21

## 2022-12-19 NOTE — TELEPHONE ENCOUNTER
Pending Prescriptions:                       Disp   Refills    DULERA 100-5 MCG/ACT inhaler [Pharmacy Med*13 g   5        Sig: TAKE 2 PUFFS BY MOUTH TWICE A DAY    Routing refill request to provider for review/approval because:  Protocol failed.    Please advise, thanks.

## 2023-01-25 DIAGNOSIS — J44.1 COPD EXACERBATION (H): ICD-10-CM

## 2023-01-27 RX ORDER — ALBUTEROL SULFATE 90 UG/1
2 AEROSOL, METERED RESPIRATORY (INHALATION) EVERY 6 HOURS PRN
Qty: 18 G | Refills: 11 | Status: SHIPPED | OUTPATIENT
Start: 2023-01-27 | End: 2023-11-06

## 2023-02-01 ENCOUNTER — MYC REFILL (OUTPATIENT)
Dept: INTERNAL MEDICINE | Facility: CLINIC | Age: 63
End: 2023-02-01
Payer: COMMERCIAL

## 2023-02-01 DIAGNOSIS — B37.0 ORAL THRUSH: ICD-10-CM

## 2023-02-02 RX ORDER — FLUCONAZOLE 100 MG/1
TABLET ORAL
Qty: 10 TABLET | Refills: 0 | Status: SHIPPED | OUTPATIENT
Start: 2023-02-02 | End: 2023-03-30

## 2023-02-02 NOTE — TELEPHONE ENCOUNTER
Diflucan  Routing refill request to provider for review/approval because:  Needs provider review

## 2023-03-30 ENCOUNTER — NURSE TRIAGE (OUTPATIENT)
Dept: NURSING | Facility: CLINIC | Age: 63
End: 2023-03-30

## 2023-03-30 ENCOUNTER — OFFICE VISIT (OUTPATIENT)
Dept: INTERNAL MEDICINE | Facility: CLINIC | Age: 63
End: 2023-03-30
Payer: COMMERCIAL

## 2023-03-30 VITALS
BODY MASS INDEX: 16.39 KG/M2 | RESPIRATION RATE: 18 BRPM | HEART RATE: 90 BPM | HEIGHT: 64 IN | SYSTOLIC BLOOD PRESSURE: 150 MMHG | TEMPERATURE: 98.1 F | WEIGHT: 96 LBS | DIASTOLIC BLOOD PRESSURE: 70 MMHG | OXYGEN SATURATION: 97 %

## 2023-03-30 DIAGNOSIS — J01.90 ACUTE SINUSITIS WITH SYMPTOMS > 10 DAYS: Primary | ICD-10-CM

## 2023-03-30 PROBLEM — J44.1 COPD EXACERBATION (H): Status: RESOLVED | Noted: 2020-09-26 | Resolved: 2023-03-30

## 2023-03-30 PROCEDURE — 99214 OFFICE O/P EST MOD 30 MIN: CPT | Performed by: INTERNAL MEDICINE

## 2023-03-30 RX ORDER — AZITHROMYCIN 250 MG/1
TABLET, FILM COATED ORAL
Qty: 6 TABLET | Refills: 0 | Status: SHIPPED | OUTPATIENT
Start: 2023-03-30 | End: 2023-04-04

## 2023-03-30 RX ORDER — PREDNISONE 20 MG/1
20 TABLET ORAL 2 TIMES DAILY
Qty: 10 TABLET | Refills: 0 | Status: SHIPPED | OUTPATIENT
Start: 2023-03-30 | End: 2024-07-05

## 2023-03-30 ASSESSMENT — ENCOUNTER SYMPTOMS
WHEEZING: 1
MUSCULOSKELETAL NEGATIVE: 1
NEUROLOGICAL NEGATIVE: 1
SINUS PRESSURE: 1
CARDIOVASCULAR NEGATIVE: 1
COUGH: 1
RHINORRHEA: 1
FATIGUE: 1
SINUS PAIN: 1
GASTROINTESTINAL NEGATIVE: 1
SORE THROAT: 1
FEVER: 1

## 2023-03-30 NOTE — PROGRESS NOTES
Assessment & Plan     Acute sinusitis with symptoms > 10 days  At this time, patient appears to have a persistent sinus infection.  She does have exquisite tenderness palpation over maxillary sinuses bilaterally as well as a significant mount of nasal drainage noted in her posterior oropharynx.  I do suspect that this is the likely cause of her wheezing.  Given the duration of her symptoms, patient will be placed on antibiotics.  She was given a prescription for azithromycin 250 mg tablets with strict take 2 tablets on day 1 followed by 1 tab by mouth once per day for 4 additional days.  She will also be provided with a short burst of prednisone 20 mg by mouth twice per day for 5 days.  Side effects of each medication were reviewed.  Patient can continue her use of her albuterol inhaler on an as-needed basis for cough, shortness of breath, or wheeze.  We will monitor her response to treatment.  - azithromycin (ZITHROMAX) 250 MG tablet; Take 2 tablets (500 mg) by mouth daily for 1 day, THEN 1 tablet (250 mg) daily for 4 days.  - predniSONE (DELTASONE) 20 MG tablet; Take 1 tablet (20 mg) by mouth 2 times daily      Prescription drug management  30 minutes spent by me on the date of the encounter doing chart review, history and exam, documentation and further activities per the note       See Patient Instructions    Say Marc MD  [facility]    Thierry Morocho is a 63 year old, presenting for the following health issues:  Sinus Problem (triaged)  No flowsheet data found.  Patient is a 63-year-old  female who presents to the clinic with a chief complaint of cold symptoms.  Patient reports that for the last 2 months she has had persistent episodes of progressively worsening sinus pressure and congestion.  Patient does report subjective fever and chills.  She has started to experience cough with small amounts of sputum production noted.  Patient will note intermittent  "episodes of wheezing.  She has tried several over-the-counter cold and cough medications without improvement.  She has been using her albuterol inhaler, but it only provides her with a short relief from the wheezing.  Patient does report a substantial headache with her symptoms as well.  She goes on to state that she has had issues with recurrent sinus infections, and she typically gets an infection at the time of transition of seasons.       TRIAGE NOTE:    Situation: sinus pain, wheezing     Background: Patient calling about a cold that she has had on and off since Christmas. Patient now has lung symptoms and facial pain. Fever 101.2 this morning. Patient reports some wheezing and is using her ventolin every 4 hours which is more than prescribed at every 6 hours. Sinus pain and headache. Face is tender to the touch. Denies redness, difficulty breathing.      Acute Illness  Acute illness concerns: sinus symptoms  Onset/Duration: about the first of the year  Symptoms:  Fever: YES  Chills/Sweats: No  Headache (location?): YES  Sinus Pressure: YES  Conjunctivitis:  No  Ear Pain: YES: left  Rhinorrhea: YES  Congestion: YES  Sore Throat: No  Cough: YES-productive of yellow sputum  Wheeze: YES  Decreased Appetite: No  Nausea: No  Vomiting: No  Diarrhea: No  Dysuria/Freq.: No  Dysuria or Hematuria: No  Fatigue/Achiness: No  Sick/Strep Exposure: No  Therapies tried and outcome: Tylenol, OTC cold medicine      Review of Systems   Constitutional: Positive for fatigue and fever.   HENT: Positive for congestion, rhinorrhea, sinus pressure, sinus pain and sore throat.    Respiratory: Positive for cough and wheezing.    Cardiovascular: Negative.    Gastrointestinal: Negative.    Genitourinary: Negative.    Musculoskeletal: Negative.    Neurological: Negative.             Objective    BP (!) 150/70   Pulse 90   Temp 98.1  F (36.7  C)   Resp 18   Ht 1.626 m (5' 4\")   Wt 43.5 kg (96 lb)   LMP  (LMP Unknown)   SpO2 97%   " Breastfeeding No   BMI 16.48 kg/m    Body mass index is 16.48 kg/m .  Physical Exam  Vitals reviewed.   HENT:      Head: Normocephalic and atraumatic.      Right Ear: Tympanic membrane, ear canal and external ear normal.      Left Ear: Tympanic membrane, ear canal and external ear normal.      Nose: Congestion and rhinorrhea present.      Right Sinus: Maxillary sinus tenderness present.      Left Sinus: Maxillary sinus tenderness present.   Eyes:      Extraocular Movements: Extraocular movements intact.      Conjunctiva/sclera: Conjunctivae normal.      Pupils: Pupils are equal, round, and reactive to light.   Cardiovascular:      Rate and Rhythm: Normal rate and regular rhythm.      Pulses: Normal pulses.      Heart sounds: Normal heart sounds.   Pulmonary:      Effort: Pulmonary effort is normal.      Breath sounds: Wheezing present.   Skin:     General: Skin is dry.      Capillary Refill: Capillary refill takes less than 2 seconds.   Neurological:      General: No focal deficit present.      Mental Status: She is alert and oriented to person, place, and time.

## 2023-03-30 NOTE — TELEPHONE ENCOUNTER
I could work her into the afternoon clinic at any time, but she should be aware that may be a wait.

## 2023-03-30 NOTE — TELEPHONE ENCOUNTER
Spoke with patient and informed of provider's message below.    Appointment scheduled for this afternoon - alisha 2:00p.  Patient is aware that she is being worked into schedule today and knows she will have a wait.

## 2023-03-30 NOTE — TELEPHONE ENCOUNTER
Nurse Triage SBAR    Is this a 2nd Level Triage? YES, LICENSED PRACTITIONER REVIEW IS REQUIRED    Situation: sinus pain, wheezing    Background: Patient calling about a cold that she has had on and off since Christmas. Patient now has lung symptoms and facial pain. Fever 101.2 this morning. Patient reports some wheezing and is using her ventolin every 4 hours which is more than prescribed at every 6 hours. Sinus pain and headache. Face is tender to the touch. Denies redness, difficulty breathing.    Patient asking if PCP has room to see her this morning versus walk in clinic      Assessment: UC, patient asking for work in appointment.     Protocol Recommended Disposition:   Go To Office Now    Recommendation: disposition     Routed to provider    Does the patient meet one of the following criteria for ADS visit consideration? 16+ years old, with an [facility] PCP     TIP  Providers, please consider if this condition is appropriate for management at one of our Acute and Diagnostic Services sites.     If patient is a good candidate, please use dotphrase <dot>triageresponse and select Refer to ADS to document.  Call back to patient at [phone]  Helen Heredia RN   03/30/23 8:59 AM  [facility] Nurse Advisor          Reason for Disposition    Fever > 101 F (38.3 C) and over 60 years of age    Additional Information    Negative: Sounds like a life-threatening emergency to the triager    Negative: Difficulty breathing, and not from stuffy nose (e.g., not relieved by cleaning out the nose)    Negative: SEVERE headache and has fever    Negative: Patient sounds very sick or weak to the triager    Negative: SEVERE sinus pain    Negative: Severe headache    Negative: Redness or swelling on the cheek, forehead, or around the eye    Negative: Fever > 103 F (39.4 C)    Protocols used: SINUS PAIN OR CONGESTION-A-OH

## 2023-04-18 DIAGNOSIS — J98.01 ACUTE BRONCHOSPASM: ICD-10-CM

## 2023-04-20 NOTE — TELEPHONE ENCOUNTER
Pending Prescriptions:                       Disp   Refills    DULERA 100-5 MCG/ACT inhaler [Pharmacy Med*13 g   5        Sig: INHALE 2 PUFFS BY MOUTH TWICE A DAY    Routing refill request to provider for review/approval because:  Protocol failed.    Please advise, thanks.

## 2023-04-21 RX ORDER — MOMETASONE FUROATE AND FORMOTEROL FUMARATE DIHYDRATE 100; 5 UG/1; UG/1
AEROSOL RESPIRATORY (INHALATION)
Qty: 13 G | Refills: 5 | Status: SHIPPED | OUTPATIENT
Start: 2023-04-21 | End: 2023-05-24

## 2023-05-02 ENCOUNTER — TELEPHONE (OUTPATIENT)
Dept: INTERNAL MEDICINE | Facility: CLINIC | Age: 63
End: 2023-05-02
Payer: COMMERCIAL

## 2023-05-02 NOTE — TELEPHONE ENCOUNTER
Prior Authorization Retail Medication Request    Medication/Dose: Brand Ventolin HFA  Covered alternative(s):  Please renew previous prior auth    Insurance Information  Insurance Name:  [facility]  Insurance ID:  [id number]  Insurance phone number:    Pharmacy Information  Name:  [facility]   Phone:  [phone] -sJudson Pak, Pharmacy Technician/Liaison, Discharge Pharmacy [phone]

## 2023-05-06 NOTE — TELEPHONE ENCOUNTER
Central Prior Authorization Team   Phone: [phone]    PA Initiation    Medication: Brand Ventolin HFA  Insurance Company: [employer] - Phone [phone] Fax [phone]  Pharmacy Filling the Rx: [place], [place] - [address] [address] [facility]  Filling Pharmacy Phone: [phone]  Filling Pharmacy Fax:    Start Date: 5/6/2023

## 2023-05-08 NOTE — TELEPHONE ENCOUNTER
Prior Authorization Approval    Authorization Effective Date: 5/6/2023  Authorization Expiration Date: 5/7/2024  Medication: Brand Ventolin HFA  Approved Dose/Quantity:    Reference #:     Insurance Company: [employer] - Phone [phone] Fax [phone]  Expected CoPay:       CoPay Card Available:      Foundation Assistance Needed:    Which Pharmacy is filling the prescription (Not needed for infusion/clinic administered): [facility] [place], [place] - [address] [address] [facility]  Pharmacy Notified: Yes  Patient Notified: Yes  **Instructed pharmacy to notify patient when script is ready to /ship.**

## 2023-05-22 DIAGNOSIS — J01.90 ACUTE SINUSITIS WITH SYMPTOMS > 10 DAYS: ICD-10-CM

## 2023-05-22 DIAGNOSIS — J98.01 ACUTE BRONCHOSPASM: ICD-10-CM

## 2023-05-23 RX ORDER — IPRATROPIUM BROMIDE 17 UG/1
AEROSOL, METERED RESPIRATORY (INHALATION)
Qty: 12.9 G | Refills: 1 | Status: SHIPPED | OUTPATIENT
Start: 2023-05-23 | End: 2023-07-07

## 2023-05-24 RX ORDER — MOMETASONE FUROATE AND FORMOTEROL FUMARATE DIHYDRATE 100; 5 UG/1; UG/1
AEROSOL RESPIRATORY (INHALATION)
Qty: 13 G | Refills: 5 | Status: SHIPPED | OUTPATIENT
Start: 2023-05-24 | End: 2023-11-06

## 2023-05-24 NOTE — TELEPHONE ENCOUNTER
Pending Prescriptions:                       Disp   Refills    DULERA 100-5 MCG/ACT inhaler [Pharmacy Med*13 g   5        Sig: INHALE 2 PUFFS BY MOUTH TWICE A DAY    Routing refill request to provider for review/approval because:  Needs provider review

## 2023-05-24 NOTE — TELEPHONE ENCOUNTER
Pending Prescriptions:                       Disp   Refills    ATROVENT HFA 17 MCG/ACT inhaler [Pharmacy*12.9 g 1            Sig: INHALE 2 PUFFS INTO THE LUNGS EVERY 6 HOURS    Prescription approved per [facility] Refill Protocol.

## 2023-07-03 ENCOUNTER — OFFICE VISIT (OUTPATIENT)
Dept: URGENT CARE | Facility: URGENT CARE | Age: 63
End: 2023-07-03
Payer: COMMERCIAL

## 2023-07-03 VITALS
SYSTOLIC BLOOD PRESSURE: 143 MMHG | RESPIRATION RATE: 16 BRPM | TEMPERATURE: 97.7 F | OXYGEN SATURATION: 97 % | BODY MASS INDEX: 16.19 KG/M2 | WEIGHT: 94.3 LBS | HEART RATE: 89 BPM | DIASTOLIC BLOOD PRESSURE: 81 MMHG

## 2023-07-03 DIAGNOSIS — J44.1 COPD EXACERBATION (H): Primary | ICD-10-CM

## 2023-07-03 PROCEDURE — 99214 OFFICE O/P EST MOD 30 MIN: CPT | Performed by: PHYSICIAN ASSISTANT

## 2023-07-03 RX ORDER — PREDNISONE 20 MG/1
20 TABLET ORAL 2 TIMES DAILY
Qty: 10 TABLET | Refills: 0 | Status: SHIPPED | OUTPATIENT
Start: 2023-07-03 | End: 2023-07-08

## 2023-07-03 RX ORDER — FLUCONAZOLE 100 MG/1
100 TABLET ORAL DAILY
Qty: 10 TABLET | Refills: 0 | Status: SHIPPED | OUTPATIENT
Start: 2023-07-03 | End: 2024-04-05

## 2023-07-03 RX ORDER — AZITHROMYCIN 250 MG/1
TABLET, FILM COATED ORAL
Qty: 6 TABLET | Refills: 0 | Status: SHIPPED | OUTPATIENT
Start: 2023-07-03 | End: 2023-07-08

## 2023-07-03 NOTE — PROGRESS NOTES
SUBJECTIVE:   Bailee Dixon is a 63 year old female presenting with a chief complaint of cough - productive, wheezing and body aches.  Onset of symptoms was 1 week(s) ago.  Course of illness is same.    Severity moderate  Current and Associated symptoms: cough - non-productive and wheezing  Treatment measures tried include Inhaler   Predisposing factors include HX of COPD.    Past Medical History:   Diagnosis Date     COPD exacerbation (H) 09/26/2020     Coronary artery disease      DDD (degenerative disc disease), lumbar      Hyperlipidemia      Migraine without aura, without mention of intractable migraine without mention of status migrainosus     infreq     NSTEMI (non-ST elevated myocardial infarction) (H)      Other acne     occ infected skin cysts     PAD (peripheral artery disease) (H)     severe; aortic stenosis, left iliac occlusion, right iliac stenosis     Smoking     quit as of 06/2020     Current Outpatient Medications   Medication Sig Dispense Refill     acetaminophen (TYLENOL) 325 MG tablet Take 325-650 mg by mouth every 4 hours as needed for mild pain       ATROVENT HFA 17 MCG/ACT inhaler INHALE 2 PUFFS INTO THE LUNGS EVERY 6 HOURS 12.9 g 1     azithromycin (ZITHROMAX) 250 MG tablet Take 2 tablets (500 mg) by mouth daily for 1 day, THEN 1 tablet (250 mg) daily for 4 days. 6 tablet 0     DULERA 100-5 MCG/ACT inhaler INHALE 2 PUFFS BY MOUTH TWICE A DAY 13 g 5     EPINEPHrine (ANY BX GENERIC EQUIV) 0.3 MG/0.3ML injection 2-pack Inject 0.3 mLs (0.3 mg) into the muscle once as needed for anaphylaxis 1 each 1     fluconazole (DIFLUCAN) 100 MG tablet Take 1 tablet (100 mg) by mouth daily 10 tablet 0     predniSONE (DELTASONE) 20 MG tablet Take 1 tablet (20 mg) by mouth 2 times daily for 5 days 10 tablet 0     predniSONE (DELTASONE) 20 MG tablet Take 1 tablet (20 mg) by mouth 2 times daily 10 tablet 0     VENTOLIN  (90 Base) MCG/ACT inhaler INHALE 2 PUFFS INTO THE LUNGS EVERY 6 HOURS AS NEEDED  FOR SHORTNESS OF BREATH / DYSPNEA OR WHEEZING 18 g 11     Social History     Tobacco Use     Smoking status: Former     Packs/day: 1.00     Years: 21.00     Pack years: 21.00     Types: Cigarettes     Quit date: 10/17/2011     Years since quittin.7     Smokeless tobacco: Never     Tobacco comments:     started  per pt, occasionally now   Substance Use Topics     Alcohol use: Yes     Alcohol/week: 0.0 standard drinks of alcohol     Comment: 2-3 drinks weekly       ROS:  Review of systems negative except as stated above.    OBJECTIVE:  BP (!) 143/81   Pulse 89   Temp 97.7  F (36.5  C)   Resp 16   Wt 42.8 kg (94 lb 4.8 oz)   LMP  (LMP Unknown)   SpO2 97%   BMI 16.19 kg/m    GENERAL APPEARANCE: healthy, alert and no distress  HENT: ear canals and TM's normal.  Nose and mouth without ulcers, erythema or lesions  NECK: supple, nontender, no lymphadenopathy  RESP: lungs clear to auscultation - no rales, rhonchi or wheezes  CV: regular rates and rhythm, normal S1 S2, no murmur noted  NEURO: Normal strength and tone, sensory exam grossly normal,  normal speech and mentation  SKIN: no suspicious lesions or rashes        ASSESSMENT:  (J44.1) COPD exacerbation (H)  (primary encounter diagnosis)  Plan: fluconazole (DIFLUCAN) 100 MG tablet,         azithromycin (ZITHROMAX) 250 MG tablet,         predniSONE (DELTASONE) 20 MG tablet      Red flags and emergent follow up discussed, and understood by patient  Follow up with PCP if symptoms worsen or fail to improve

## 2023-07-05 ENCOUNTER — TRANSFERRED RECORDS (OUTPATIENT)
Dept: HEALTH INFORMATION MANAGEMENT | Facility: CLINIC | Age: 63
End: 2023-07-05
Payer: COMMERCIAL

## 2023-07-06 DIAGNOSIS — J01.90 ACUTE SINUSITIS WITH SYMPTOMS > 10 DAYS: ICD-10-CM

## 2023-07-07 RX ORDER — IPRATROPIUM BROMIDE 17 UG/1
AEROSOL, METERED RESPIRATORY (INHALATION)
Qty: 12.9 G | Refills: 1 | Status: SHIPPED | OUTPATIENT
Start: 2023-07-07 | End: 2023-08-28

## 2023-07-07 NOTE — TELEPHONE ENCOUNTER
Prescription approved per [facility] Refill Protocol.    Richie Crain, Triage RN [facility]  11:32 AM 7/7/2023

## 2023-07-11 ENCOUNTER — ANCILLARY PROCEDURE (OUTPATIENT)
Dept: GENERAL RADIOLOGY | Facility: CLINIC | Age: 63
End: 2023-07-11
Attending: INTERNAL MEDICINE
Payer: COMMERCIAL

## 2023-07-11 DIAGNOSIS — R05.1 ACUTE COUGH: ICD-10-CM

## 2023-07-11 PROCEDURE — 71046 X-RAY EXAM CHEST 2 VIEWS: CPT | Mod: TC | Performed by: RADIOLOGY

## 2023-08-19 ENCOUNTER — HEALTH MAINTENANCE LETTER (OUTPATIENT)
Age: 63
End: 2023-08-19

## 2023-08-27 DIAGNOSIS — J01.90 ACUTE SINUSITIS WITH SYMPTOMS > 10 DAYS: ICD-10-CM

## 2023-08-28 RX ORDER — IPRATROPIUM BROMIDE 17 UG/1
AEROSOL, METERED RESPIRATORY (INHALATION)
Qty: 12.9 G | Refills: 1 | Status: SHIPPED | OUTPATIENT
Start: 2023-08-28 | End: 2023-10-23

## 2023-08-28 NOTE — TELEPHONE ENCOUNTER
Routing refill request to provider for review/approval because:  Diagnosis is Acute sinusitis with symptoms > 10 days - should patient continue using inhaler?

## 2023-10-22 DIAGNOSIS — J01.90 ACUTE SINUSITIS WITH SYMPTOMS > 10 DAYS: ICD-10-CM

## 2023-10-23 RX ORDER — IPRATROPIUM BROMIDE 17 UG/1
AEROSOL, METERED RESPIRATORY (INHALATION)
Qty: 12.9 G | Refills: 3 | Status: SHIPPED | OUTPATIENT
Start: 2023-10-23 | End: 2024-01-29

## 2023-10-25 ENCOUNTER — TRANSFERRED RECORDS (OUTPATIENT)
Dept: HEALTH INFORMATION MANAGEMENT | Facility: CLINIC | Age: 63
End: 2023-10-25
Payer: COMMERCIAL

## 2023-11-03 DIAGNOSIS — J98.01 ACUTE BRONCHOSPASM: ICD-10-CM

## 2023-11-05 DIAGNOSIS — J44.1 COPD EXACERBATION (H): ICD-10-CM

## 2023-11-06 RX ORDER — MOMETASONE FUROATE AND FORMOTEROL FUMARATE DIHYDRATE 100; 5 UG/1; UG/1
AEROSOL RESPIRATORY (INHALATION)
Qty: 13 G | Refills: 5 | Status: SHIPPED | OUTPATIENT
Start: 2023-11-06 | End: 2024-04-22

## 2023-11-06 RX ORDER — ALBUTEROL SULFATE 90 UG/1
2 AEROSOL, METERED RESPIRATORY (INHALATION) EVERY 6 HOURS PRN
Qty: 18 G | Refills: 5 | Status: SHIPPED | OUTPATIENT
Start: 2023-11-06 | End: 2024-03-27

## 2024-01-29 DIAGNOSIS — J01.90 ACUTE SINUSITIS WITH SYMPTOMS > 10 DAYS: ICD-10-CM

## 2024-01-29 RX ORDER — IPRATROPIUM BROMIDE 17 UG/1
2 AEROSOL, METERED RESPIRATORY (INHALATION) EVERY 6 HOURS
Qty: 12.9 G | Refills: 3 | Status: SHIPPED | OUTPATIENT
Start: 2024-01-29 | End: 2024-07-19

## 2024-01-29 NOTE — TELEPHONE ENCOUNTER
Acute sinusitis with symptoms > 10 days [J01.90]  - please advise on diagnosis for refill     Thank you     Joslyn Hernandez RN, BSN  [facility] [place]

## 2024-02-08 NOTE — TELEPHONE ENCOUNTER
----- Message from Gladis Valladares sent at 2/8/2024  8:20 AM CST -----  Contact: Francis  Type:  Patient Returning Call    Who Called:Francis   Who Left Message for Patient:Tim   Does the patient know what this is regarding?: not sure   Would the patient rather a call back or a response via [facility]? Call back   Best Call Back Number:[phone]  Additional Information:     Thanks   Am      Pt calls back. She states the Nystatin solution makes her gag and she cannot tolerate it. . She is asking if Dr Singh will give her the pill for thrush instead.   Dr Blanchard used to give her this in the past.     Pended last Rx from 2008.

## 2024-03-26 DIAGNOSIS — J44.1 COPD EXACERBATION (H): ICD-10-CM

## 2024-03-27 RX ORDER — ALBUTEROL SULFATE 90 UG/1
2 AEROSOL, METERED RESPIRATORY (INHALATION) EVERY 6 HOURS PRN
Qty: 18 G | Refills: 0 | Status: SHIPPED | OUTPATIENT
Start: 2024-03-27 | End: 2024-04-29

## 2024-04-20 DIAGNOSIS — J98.01 ACUTE BRONCHOSPASM: ICD-10-CM

## 2024-04-22 RX ORDER — MOMETASONE FUROATE AND FORMOTEROL FUMARATE DIHYDRATE 100; 5 UG/1; UG/1
AEROSOL RESPIRATORY (INHALATION)
Qty: 13 G | Refills: 5 | Status: SHIPPED | OUTPATIENT
Start: 2024-04-22 | End: 2024-08-23

## 2024-04-27 DIAGNOSIS — J44.1 COPD EXACERBATION (H): ICD-10-CM

## 2024-04-29 RX ORDER — ALBUTEROL SULFATE 90 UG/1
2 AEROSOL, METERED RESPIRATORY (INHALATION) EVERY 6 HOURS PRN
Qty: 18 G | Refills: 0 | Status: SHIPPED | OUTPATIENT
Start: 2024-04-29 | End: 2024-06-03

## 2024-05-15 ENCOUNTER — TELEPHONE (OUTPATIENT)
Dept: INTERNAL MEDICINE | Facility: CLINIC | Age: 64
End: 2024-05-15
Payer: COMMERCIAL

## 2024-05-15 DIAGNOSIS — J44.1 COPD EXACERBATION (H): ICD-10-CM

## 2024-05-16 RX ORDER — ALBUTEROL SULFATE 90 UG/1
AEROSOL, METERED RESPIRATORY (INHALATION)
Qty: 18 G | Refills: 0 | OUTPATIENT
Start: 2024-05-16

## 2024-05-16 NOTE — TELEPHONE ENCOUNTER
Called and spoke with patient who states she wasn't aware she had refill at pharmacy. Patient says she doesn't need refill and this one can be denied.    Denied refill.    Thank you,  Richie Crain, Triage RN [facility]  10:33 AM 5/16/2024

## 2024-05-16 NOTE — TELEPHONE ENCOUNTER
Pending Prescriptions:                       Disp   Refills    VENTOLIN  (90 Base) MCG/ACT inhale*18 g   0            Sig: INHALE 2 PUFFS INTO THE LUNGS EVERY 6 HOURS AS           NEEDED FOR SHORTNESS OF BREATH, DYSPNEA OR           WHEEZING    Last fill 4/29/24.  Please call patient to see if she needs refill since last refill 2+ wks ago.

## 2024-06-03 ENCOUNTER — TELEPHONE (OUTPATIENT)
Dept: INTERNAL MEDICINE | Facility: CLINIC | Age: 64
End: 2024-06-03
Payer: COMMERCIAL

## 2024-06-03 DIAGNOSIS — J44.1 COPD EXACERBATION (H): ICD-10-CM

## 2024-06-03 RX ORDER — ALBUTEROL SULFATE 90 UG/1
AEROSOL, METERED RESPIRATORY (INHALATION)
Qty: 18 G | Refills: 0 | Status: SHIPPED | OUTPATIENT
Start: 2024-06-03 | End: 2024-07-05

## 2024-06-03 RX ORDER — ALBUTEROL SULFATE 90 UG/1
2 AEROSOL, METERED RESPIRATORY (INHALATION) EVERY 6 HOURS PRN
Qty: 18 G | Refills: 0 | Status: CANCELLED | OUTPATIENT
Start: 2024-06-03

## 2024-06-03 NOTE — TELEPHONE ENCOUNTER
Her insurance plan even with cris 1 doesn't  cover this medication, looks like a previous prior auth was in place and exp. 05/07/24.. Thanks

## 2024-06-03 NOTE — TELEPHONE ENCOUNTER
Prior Authorization Retail Medication Request    Medication/Dose: VENTOLIN  (90 Base) MCG/ACT inhaler  Diagnosis and ICD code (if different than what is on RX):    New/renewal/insurance change PA/secondary ins. PA:  Previously Tried and Failed:    Rationale:  COPD exacerbation (H)     Insurance   Primary: [employer]  Insurance ID:  [id number]      Secondary (if applicable):  Insurance ID:      Pharmacy Information (if different than what is on RX)  Name:    Phone:    Fax:

## 2024-06-05 ENCOUNTER — MYC REFILL (OUTPATIENT)
Dept: INTERNAL MEDICINE | Facility: CLINIC | Age: 64
End: 2024-06-05
Payer: COMMERCIAL

## 2024-06-05 DIAGNOSIS — J44.1 COPD EXACERBATION (H): ICD-10-CM

## 2024-06-05 RX ORDER — ALBUTEROL SULFATE 90 UG/1
AEROSOL, METERED RESPIRATORY (INHALATION)
Qty: 18 G | Refills: 0 | Status: CANCELLED | OUTPATIENT
Start: 2024-06-05

## 2024-06-06 NOTE — TELEPHONE ENCOUNTER
Call to pharmacy. They received the Rx. However, Pt wanted the Brand Ventolin and insurance doesn't cover brand.   She needs to use Generic or we need to try for PA.   Will check with pt, sent Linea message back.

## 2024-06-12 NOTE — TELEPHONE ENCOUNTER
Central Prior Authorization Team   Phone: [phone]    PA Initiation    Medication: VENTOLIN  (90 Base) MCG/ACT inhaler  Insurance Company: [employer] - Phone [phone] Fax [phone]  Pharmacy Filling the Rx: [facility] [address] [facility] [place]  Filling Pharmacy Phone: [phone]  Filling Pharmacy Fax:    Start Date: 6/12/2024

## 2024-06-12 NOTE — TELEPHONE ENCOUNTER
PRIOR AUTHORIZATION DENIED    Medication: VENTOLIN  (90 Base) MCG/ACT inhaler    Denial Date: 6/12/2024    Denial Rational:                Appeal Information:    If you would like to appeal, please supply P/A team with a letter of medical necessity with clinical reason.

## 2024-06-13 RX ORDER — ALBUTEROL SULFATE 90 UG/1
2 AEROSOL, METERED RESPIRATORY (INHALATION) EVERY 6 HOURS PRN
Qty: 18 G | Refills: 5 | Status: SHIPPED | OUTPATIENT
Start: 2024-06-13 | End: 2024-07-05

## 2024-07-05 ENCOUNTER — OFFICE VISIT (OUTPATIENT)
Dept: INTERNAL MEDICINE | Facility: CLINIC | Age: 64
End: 2024-07-05
Payer: COMMERCIAL

## 2024-07-05 ENCOUNTER — TELEPHONE (OUTPATIENT)
Dept: INTERNAL MEDICINE | Facility: CLINIC | Age: 64
End: 2024-07-05

## 2024-07-05 VITALS
TEMPERATURE: 97.9 F | SYSTOLIC BLOOD PRESSURE: 138 MMHG | DIASTOLIC BLOOD PRESSURE: 74 MMHG | RESPIRATION RATE: 19 BRPM | HEIGHT: 64 IN | HEART RATE: 94 BPM | BODY MASS INDEX: 16.37 KG/M2 | OXYGEN SATURATION: 100 % | WEIGHT: 95.9 LBS

## 2024-07-05 DIAGNOSIS — Z87.891 PERSONAL HISTORY OF TOBACCO USE, PRESENTING HAZARDS TO HEALTH: ICD-10-CM

## 2024-07-05 DIAGNOSIS — Z00.00 ENCOUNTER FOR PREVENTATIVE ADULT HEALTH CARE EXAMINATION: Primary | ICD-10-CM

## 2024-07-05 DIAGNOSIS — Z91.030 BEE STING ALLERGY: ICD-10-CM

## 2024-07-05 DIAGNOSIS — L73.9 FOLLICULITIS: ICD-10-CM

## 2024-07-05 DIAGNOSIS — J42 CHRONIC BRONCHITIS, UNSPECIFIED CHRONIC BRONCHITIS TYPE (H): ICD-10-CM

## 2024-07-05 DIAGNOSIS — R03.0 ELEVATED BP WITHOUT DIAGNOSIS OF HYPERTENSION: ICD-10-CM

## 2024-07-05 LAB
ALBUMIN SERPL BCG-MCNC: 4.7 G/DL (ref 3.5–5.2)
ALBUMIN UR-MCNC: NEGATIVE MG/DL
ALP SERPL-CCNC: 137 U/L (ref 40–150)
ALT SERPL W P-5'-P-CCNC: 14 U/L (ref 0–50)
ANION GAP SERPL CALCULATED.3IONS-SCNC: 12 MMOL/L (ref 7–15)
APPEARANCE UR: CLEAR
AST SERPL W P-5'-P-CCNC: 23 U/L (ref 0–45)
BACTERIA #/AREA URNS HPF: ABNORMAL /HPF
BILIRUB SERPL-MCNC: 0.3 MG/DL
BILIRUB UR QL STRIP: NEGATIVE
BUN SERPL-MCNC: 6.4 MG/DL (ref 8–23)
CALCIUM SERPL-MCNC: 9.4 MG/DL (ref 8.8–10.2)
CHLORIDE SERPL-SCNC: 95 MMOL/L (ref 98–107)
CHOLEST SERPL-MCNC: 244 MG/DL
COLOR UR AUTO: YELLOW
CREAT SERPL-MCNC: 0.48 MG/DL (ref 0.51–0.95)
DEPRECATED HCO3 PLAS-SCNC: 24 MMOL/L (ref 22–29)
EGFRCR SERPLBLD CKD-EPI 2021: >90 ML/MIN/1.73M2
ERYTHROCYTE [DISTWIDTH] IN BLOOD BY AUTOMATED COUNT: 12.4 % (ref 10–15)
FASTING STATUS PATIENT QL REPORTED: NO
FASTING STATUS PATIENT QL REPORTED: NO
GLUCOSE SERPL-MCNC: 109 MG/DL (ref 70–99)
GLUCOSE UR STRIP-MCNC: NEGATIVE MG/DL
HCT VFR BLD AUTO: 40.3 % (ref 35–47)
HDLC SERPL-MCNC: 121 MG/DL
HGB BLD-MCNC: 14 G/DL (ref 11.7–15.7)
HGB UR QL STRIP: NEGATIVE
KETONES UR STRIP-MCNC: NEGATIVE MG/DL
LDLC SERPL CALC-MCNC: 110 MG/DL
LEUKOCYTE ESTERASE UR QL STRIP: NEGATIVE
MCH RBC QN AUTO: 30.3 PG (ref 26.5–33)
MCHC RBC AUTO-ENTMCNC: 34.7 G/DL (ref 31.5–36.5)
MCV RBC AUTO: 87 FL (ref 78–100)
MUCOUS THREADS #/AREA URNS LPF: PRESENT /LPF
NITRATE UR QL: NEGATIVE
NONHDLC SERPL-MCNC: 123 MG/DL
PH UR STRIP: 6 [PH] (ref 5–7)
PLATELET # BLD AUTO: 368 10E3/UL (ref 150–450)
POTASSIUM SERPL-SCNC: 4.8 MMOL/L (ref 3.4–5.3)
PROT SERPL-MCNC: 7.6 G/DL (ref 6.4–8.3)
RBC # BLD AUTO: 4.62 10E6/UL (ref 3.8–5.2)
RBC #/AREA URNS AUTO: ABNORMAL /HPF
SODIUM SERPL-SCNC: 131 MMOL/L (ref 135–145)
SP GR UR STRIP: 1.02 (ref 1–1.03)
SQUAMOUS #/AREA URNS AUTO: ABNORMAL /LPF
TRIGL SERPL-MCNC: 64 MG/DL
TSH SERPL DL<=0.005 MIU/L-ACNC: 1.24 UIU/ML (ref 0.3–4.2)
UROBILINOGEN UR STRIP-ACNC: 0.2 E.U./DL
WBC # BLD AUTO: 10.9 10E3/UL (ref 4–11)
WBC #/AREA URNS AUTO: ABNORMAL /HPF
WBC CLUMPS #/AREA URNS HPF: PRESENT /HPF

## 2024-07-05 PROCEDURE — 84443 ASSAY THYROID STIM HORMONE: CPT | Performed by: INTERNAL MEDICINE

## 2024-07-05 PROCEDURE — 85027 COMPLETE CBC AUTOMATED: CPT | Performed by: INTERNAL MEDICINE

## 2024-07-05 PROCEDURE — 80061 LIPID PANEL: CPT | Performed by: INTERNAL MEDICINE

## 2024-07-05 PROCEDURE — 99396 PREV VISIT EST AGE 40-64: CPT | Performed by: INTERNAL MEDICINE

## 2024-07-05 PROCEDURE — 80053 COMPREHEN METABOLIC PANEL: CPT | Performed by: INTERNAL MEDICINE

## 2024-07-05 PROCEDURE — 81001 URINALYSIS AUTO W/SCOPE: CPT | Performed by: INTERNAL MEDICINE

## 2024-07-05 PROCEDURE — 36415 COLL VENOUS BLD VENIPUNCTURE: CPT | Performed by: INTERNAL MEDICINE

## 2024-07-05 RX ORDER — CEPHALEXIN 500 MG/1
500 CAPSULE ORAL 2 TIMES DAILY
Qty: 10 CAPSULE | Refills: 3 | Status: SHIPPED | OUTPATIENT
Start: 2024-07-05 | End: 2024-08-23

## 2024-07-05 RX ORDER — EPINEPHRINE 0.3 MG/.3ML
0.3 INJECTION SUBCUTANEOUS
Qty: 1 EACH | Refills: 1 | Status: SHIPPED | OUTPATIENT
Start: 2024-07-05

## 2024-07-05 RX ORDER — ALBUTEROL SULFATE 90 UG/1
2 AEROSOL, METERED RESPIRATORY (INHALATION) EVERY 6 HOURS PRN
Qty: 36 G | Refills: 11 | Status: SHIPPED | OUTPATIENT
Start: 2024-07-05

## 2024-07-05 SDOH — HEALTH STABILITY: PHYSICAL HEALTH: ON AVERAGE, HOW MANY DAYS PER WEEK DO YOU ENGAGE IN MODERATE TO STRENUOUS EXERCISE (LIKE A BRISK WALK)?: 3 DAYS

## 2024-07-05 SDOH — HEALTH STABILITY: PHYSICAL HEALTH: ON AVERAGE, HOW MANY MINUTES DO YOU ENGAGE IN EXERCISE AT THIS LEVEL?: 20 MIN

## 2024-07-05 ASSESSMENT — SOCIAL DETERMINANTS OF HEALTH (SDOH): HOW OFTEN DO YOU GET TOGETHER WITH FRIENDS OR RELATIVES?: TWICE A WEEK

## 2024-07-05 ASSESSMENT — PAIN SCALES - GENERAL: PAINLEVEL: NO PAIN (0)

## 2024-07-05 NOTE — PROGRESS NOTES
Preventive Care Visit  [facility]  Robert Singh MD, Internal Medicine  Jul 5, 2024      Assessment & Plan     Personal history of tobacco use, presenting hazards to health    - CT Chest Lung Cancer Screen Low Dose Without; Future    Encounter for preventative adult health care examination  advised regular aerobic activity, low cholesterol, low salt diet, wearing seat belt,  self examinations, sunscreen protection.Obtain screening cholesterol, immunizations reviewed.    - cephALEXin (KEFLEX) 500 MG capsule; Take 1 capsule (500 mg) by mouth 2 times daily  - CT Chest Lung Cancer Screen Low Dose Without; Future  - Lipid panel reflex to direct LDL Fasting  - CBC with platelets  - Comprehensive metabolic panel (BMP + Alb, Alk Phos, ALT, AST, Total. Bili, TP)  - TSH with free T4 reflex  - UA with Microscopic reflex to Culture - lab collect    Folliculitis  Use PRN Keflex for flare ups   - cephALEXin (KEFLEX) 500 MG capsule; Take 1 capsule (500 mg) by mouth 2 times daily  - OFFICE/OUTPT VISIT,EST,LEVL III    Bee sting allergy    - EPINEPHrine (ANY BX GENERIC EQUIV) 0.3 MG/0.3ML injection 2-pack; Inject 0.3 mLs (0.3 mg) into the muscle once as needed for anaphylaxis    Elevated BP without diagnosis of hypertension  Keep low salt diet, monitor BP   - OFFICE/OUTPT VISIT,EST,LEVL III    Chronic bronchitis, unspecified chronic bronchitis type (H)  Use Albuterol PRN  Has quit smoking   - OFFICE/OUTPT VISIT,EST,LEVL III    Patient has been advised of split billing requirements and indicates understanding: Yes        Counseling  Appropriate preventive services were discussed with this patient, including applicable screening as appropriate for fall prevention, nutrition, physical activity, Tobacco-use cessation, weight loss and cognition.  Checklist reviewing preventive services available has been given to the patient.  Reviewed patient's diet, addressing concerns and/or questions.   She is at risk  for lack of exercise and has been provided with information to increase physical activity for the benefit of her well-being.   She is at risk for psychosocial distress and has been provided with information to reduce risk.   The patient reports drinking more than 3 alcoholic drinks per day and/or more than 7 drhnks per week. The patient was counseled and given information about possible harmful effects of excessive alcohol intake.    See Patient Instructions    Thierry Morocho is a 64 year old, presenting for the following:  Wellness Visit        7/5/2024     8:11 AM   Additional Questions   Roomed by Dora See MA   Accompanied by self         7/5/2024     8:11 AM   Patient Reported Additional Medications   Patient reports taking the following new medications None            HPI    Has h/o COPD. Uses PRN albuterol. No flare ups.   Has h/o folliculitis of the thighs, uses Keflex PRN.   Has bee sting allergies. Needs refill of Epi pen             7/5/2024   General Health   How would you rate your overall physical health? Good   Feel stress (tense, anxious, or unable to sleep) Only a little      (!) STRESS CONCERN      7/5/2024   Nutrition   Three or more servings of calcium each day? Yes   Diet: Regular (no restrictions)   How many servings of fruit and vegetables per day? (!) 2-3   How many sweetened beverages each day? (!) 4+            7/5/2024   Exercise   Days per week of moderate/strenous exercise 3 days   Average minutes spent exercising at this level 20 min            7/5/2024   Social Factors   Frequency of gathering with friends or relatives Twice a week   Worry food won't last until get money to buy more No   Food not last or not have enough money for food? No   Do you have housing? (Housing is defined as stable permanent housing and does not include staying ouside in a car, in a tent, in an abandoned building, in an overnight shelter, or couch-surfing.) Yes   Are you worried about losing your  housing? No   Lack of transportation? No   Unable to get utilities (heat,electricity)? No            2024   Fall Risk   Fallen 2 or more times in the past year? No   Trouble with walking or balance? No             2024   Dental   Dentist two times every year? Yes            2024   TB Screening   Were you born outside of the [place]? No            Today's PHQ-2 Score:       2024     8:07 AM   PHQ-2 (  [employer])   Q1: Little interest or pleasure in doing things 0   Q2: Feeling down, depressed or hopeless 0   PHQ-2 Score 0   Q1: Little interest or pleasure in doing things Not at all   Q2: Feeling down, depressed or hopeless Not at all   PHQ-2 Score 0           2024   Substance Use   Alcohol more than 3/day or more than 7/wk Yes   How often do you have a drink containing alcohol 2 to 3 times a week   How many alcohol drinks on typical day 1 or 2   How often do you have 5+ drinks at one occasion Never   Audit 2/3 Score 0   How often not able to stop drinking once started Never   How often failed to do what normally expected Never   How often needed first drink in am after a heavy drinking session Never   How often feeling of guilt or remorse after drinking Never   How often unable to remember what happened the night before Never   Have you or someone else been injured because of your drinking No   Has anyone been concerned or suggested you cut down on drinking No   TOTAL SCORE - AUDIT 3   Do you use any other substances recreationally? (!) CANNABIS PRODUCTS        Social History     Tobacco Use    Smoking status: Former     Current packs/day: 0.00     Average packs/day: 1 pack/day for 21.0 years (21.0 ttl pk-yrs)     Types: Cigarettes     Start date: 10/17/1990     Quit date: 10/17/2011     Years since quittin.7    Smokeless tobacco: Never    Tobacco comments:     started  per pt, occasionally now   Vaping Use    Vaping status: Never Used   Substance Use Topics    Alcohol use: Yes      "Alcohol/week: 0.0 standard drinks of alcohol     Comment: 2-3 drinks weekly    Drug use: No          Mammogram Screening - Mammogram every 1-2 years updated in Health Maintenance based on mutual decision making          7/5/2024   One time HIV Screening   Previous HIV test? Yes          7/5/2024   STI Screening   New sexual partner(s) since last STI/HIV test? No        History of abnormal Pap smear: Status post hysterectomy with removal of cervix and no history of CIN2 or greater or cervical cancer. Health Maintenance and Surgical History updated.       ASCVD Risk   The ASCVD Risk score (Nola MCINTYRE, et al., 2019) failed to calculate for the following reasons:    The patient has a prior MI or stroke diagnosis           Reviewed and updated as needed this visit by Provider                    Lab work is in process  Labs reviewed in EPIC      Review of Systems  Constitutional, HEENT, cardiovascular, pulmonary, GI, , musculoskeletal, neuro, skin, endocrine and psych systems are negative, except as otherwise noted.     Objective    Exam  BP (!) 152/83 (BP Location: Left arm, Patient Position: Sitting, Cuff Size: Adult Small)   Pulse 94   Temp 97.9  F (36.6  C) (Oral)   Resp 19   Ht 1.626 m (5' 4.02\")   Wt 43.5 kg (95 lb 14.4 oz)   LMP  (LMP Unknown)   SpO2 100%   Breastfeeding No   BMI 16.45 kg/m     Estimated body mass index is 16.45 kg/m  as calculated from the following:    Height as of this encounter: 1.626 m (5' 4.02\").    Weight as of this encounter: 43.5 kg (95 lb 14.4 oz).    Physical Exam  GENERAL: alert and no distress  EYES: Eyes grossly normal to inspection, PERRL and conjunctivae and sclerae normal  HENT: ear canals and TM's normal, nose and mouth without ulcers or lesions  NECK: no adenopathy, no asymmetry, masses, or scars  RESP: lungs clear to auscultation - no rales, rhonchi or wheezes  CV: regular rate and rhythm, normal S1 S2, no S3 or S4, no murmur, click or rub, no peripheral " edema  ABDOMEN: soft, nontender, no hepatosplenomegaly, no masses and bowel sounds normal  MS: no gross musculoskeletal defects noted, no edema  SKIN: no suspicious lesions or rashes  NEURO: Normal strength and tone, mentation intact and speech normal  PSYCH: mentation appears normal, affect normal/bright        Signed Electronically by: Robert Singh MD

## 2024-07-05 NOTE — TELEPHONE ENCOUNTER
PA request for Medication:  Ventolin    Insurance name:  [facility]  Insurance phone number:  [phone]  Insurance ID:  [id number]    Patient needs brand name Ventolin. Prescription is written for ROBERTA lauraolin.     Pharmacy requesting the PA:  [facility]    Thank you!     Chyna Costa, Pharm D

## 2024-07-08 ENCOUNTER — HOSPITAL ENCOUNTER (OUTPATIENT)
Dept: MAMMOGRAPHY | Facility: CLINIC | Age: 64
Discharge: HOME | End: 2024-07-08
Attending: INTERNAL MEDICINE | Admitting: INTERNAL MEDICINE
Payer: COMMERCIAL

## 2024-07-08 DIAGNOSIS — Z12.31 VISIT FOR SCREENING MAMMOGRAM: ICD-10-CM

## 2024-07-08 PROCEDURE — 77063 BREAST TOMOSYNTHESIS BI: CPT

## 2024-07-10 NOTE — TELEPHONE ENCOUNTER
PA Initiation    Medication: VENTOLIN  (90 BASE) MCG/ACT IN AERS  Insurance Company: [employer] ([facility]) - Phone [phone] Fax [phone]  Pharmacy Filling the Rx: [facility] [id number] [place] [address]  Filling Pharmacy Phone: [phone]  Filling Pharmacy Fax:    Start Date: 7/10/2024  Retail Pharmacy Prior Authorization Team   Phone: [phone]

## 2024-07-15 NOTE — TELEPHONE ENCOUNTER
PRIOR AUTHORIZATION DENIED    Medication: VENTOLIN  (90 BASE) MCG/ACT IN AERS  Insurance Company: [employer] ([facility]) - Phone [phone] Fax [phone]  Denial Date: 7/11/2024  Denial Reason(s):     Appeal Information:     Patient Notified: The clinic should notify the patient of the denial.

## 2024-07-15 NOTE — TELEPHONE ENCOUNTER
Medication prior authorization denied, see rationale below and advise.    Thank you.     Richie Crain, Triage RN [facility]  11:00 AM 7/15/2024

## 2024-07-17 NOTE — TELEPHONE ENCOUNTER
Spoke with patient.  Reports only the Ventolin brand works for her     Patient states Proair and Albuterol inh made her worse.    [facility] pharmacy gave her a coupon and she was able to get the Ventolin inh.

## 2024-07-18 DIAGNOSIS — J01.90 ACUTE SINUSITIS WITH SYMPTOMS > 10 DAYS: ICD-10-CM

## 2024-07-19 ENCOUNTER — TELEPHONE (OUTPATIENT)
Dept: INTERNAL MEDICINE | Facility: CLINIC | Age: 64
End: 2024-07-19
Payer: COMMERCIAL

## 2024-07-19 ENCOUNTER — HOSPITAL ENCOUNTER (OUTPATIENT)
Dept: CT IMAGING | Facility: CLINIC | Age: 64
Discharge: HOME | End: 2024-07-19
Attending: INTERNAL MEDICINE | Admitting: INTERNAL MEDICINE
Payer: COMMERCIAL

## 2024-07-19 DIAGNOSIS — Z00.00 ENCOUNTER FOR PREVENTATIVE ADULT HEALTH CARE EXAMINATION: ICD-10-CM

## 2024-07-19 DIAGNOSIS — Z87.891 PERSONAL HISTORY OF TOBACCO USE, PRESENTING HAZARDS TO HEALTH: ICD-10-CM

## 2024-07-19 PROCEDURE — 71271 CT THORAX LUNG CANCER SCR C-: CPT

## 2024-07-19 RX ORDER — IPRATROPIUM BROMIDE 17 UG/1
2 AEROSOL, METERED RESPIRATORY (INHALATION) EVERY 6 HOURS
Qty: 12.9 G | Refills: 0 | Status: SHIPPED | OUTPATIENT
Start: 2024-07-19 | End: 2024-08-12

## 2024-07-19 NOTE — TELEPHONE ENCOUNTER
Kerry from [facility] calls, [phone]. Is reporting about a CT chest scan the patient had done today.    Notably the 2nd impression: Significant Incidental Finding(s):  Category S: Yes. Moderate coronary artery calcifications. Borderline prominent mediastinal lymph node.      Will forward to PCP

## 2024-07-25 ENCOUNTER — MYC MEDICAL ADVICE (OUTPATIENT)
Dept: INTERNAL MEDICINE | Facility: CLINIC | Age: 64
End: 2024-07-25
Payer: COMMERCIAL

## 2024-07-25 DIAGNOSIS — J44.9 CHRONIC OBSTRUCTIVE PULMONARY DISEASE, UNSPECIFIED COPD TYPE (H): Primary | ICD-10-CM

## 2024-07-25 NOTE — TELEPHONE ENCOUNTER
My chart message sent by patient.    My chart message sent to patient.     Please see 07/16/24 telephone encounter.

## 2024-07-26 NOTE — TELEPHONE ENCOUNTER
Per most recent YEVVOt message below, patient states she has not tried any of the covered alternatives and is asking for provider to choose one that will work best or similar to Dulera.    Please advise, thanks.

## 2024-07-29 RX ORDER — FLUTICASONE FUROATE AND VILANTEROL 100; 25 UG/1; UG/1
1 POWDER RESPIRATORY (INHALATION) DAILY
Qty: 60 EACH | Refills: 0 | Status: SHIPPED | OUTPATIENT
Start: 2024-07-29 | End: 2024-08-23

## 2024-08-11 DIAGNOSIS — J01.90 ACUTE SINUSITIS WITH SYMPTOMS > 10 DAYS: ICD-10-CM

## 2024-08-12 ENCOUNTER — MYC MEDICAL ADVICE (OUTPATIENT)
Dept: INTERNAL MEDICINE | Facility: CLINIC | Age: 64
End: 2024-08-12
Payer: COMMERCIAL

## 2024-08-12 DIAGNOSIS — J42 CHRONIC BRONCHITIS, UNSPECIFIED CHRONIC BRONCHITIS TYPE (H): Primary | ICD-10-CM

## 2024-08-12 RX ORDER — IPRATROPIUM BROMIDE 17 UG/1
AEROSOL, METERED RESPIRATORY (INHALATION)
Qty: 12.9 G | Refills: 0 | Status: SHIPPED | OUTPATIENT
Start: 2024-08-12 | End: 2024-09-03

## 2024-08-12 RX ORDER — BUDESONIDE AND FORMOTEROL FUMARATE DIHYDRATE 80; 4.5 UG/1; UG/1
2 AEROSOL RESPIRATORY (INHALATION) 2 TIMES DAILY
Qty: 30.6 G | Refills: 3 | Status: SHIPPED | OUTPATIENT
Start: 2024-08-12

## 2024-08-12 NOTE — TELEPHONE ENCOUNTER
Last OV - 07/05/2024 for annual physical.     Please see patient's mychart message.     Please advise, thanks.    Richie Crain, Triage RN [facility]  10:55 AM 8/12/2024

## 2024-08-13 NOTE — TELEPHONE ENCOUNTER
[email or web address] message sent to patient with provider recommendation.     Summer RN 7:18 AM August 13, 2024   [facility]

## 2024-08-23 ENCOUNTER — OFFICE VISIT (OUTPATIENT)
Dept: INTERNAL MEDICINE | Facility: CLINIC | Age: 64
End: 2024-08-23
Payer: COMMERCIAL

## 2024-08-23 VITALS
OXYGEN SATURATION: 97 % | WEIGHT: 90.9 LBS | HEIGHT: 64 IN | RESPIRATION RATE: 16 BRPM | SYSTOLIC BLOOD PRESSURE: 142 MMHG | TEMPERATURE: 98.3 F | DIASTOLIC BLOOD PRESSURE: 74 MMHG | BODY MASS INDEX: 15.52 KG/M2 | HEART RATE: 84 BPM

## 2024-08-23 DIAGNOSIS — J06.9 UPPER RESPIRATORY TRACT INFECTION, UNSPECIFIED TYPE: Primary | ICD-10-CM

## 2024-08-23 PROCEDURE — 99213 OFFICE O/P EST LOW 20 MIN: CPT

## 2024-08-23 RX ORDER — AZITHROMYCIN 250 MG/1
TABLET, FILM COATED ORAL
Qty: 6 TABLET | Refills: 0 | Status: SHIPPED | OUTPATIENT
Start: 2024-08-23 | End: 2024-08-28

## 2024-08-23 RX ORDER — BENZONATATE 200 MG/1
200 CAPSULE ORAL 3 TIMES DAILY PRN
Qty: 42 CAPSULE | Refills: 0 | Status: SHIPPED | OUTPATIENT
Start: 2024-08-23 | End: 2024-09-06

## 2024-08-23 RX ORDER — PREDNISONE 20 MG/1
40 TABLET ORAL DAILY
Qty: 10 TABLET | Refills: 0 | Status: SHIPPED | OUTPATIENT
Start: 2024-08-23 | End: 2024-08-28

## 2024-08-23 ASSESSMENT — ENCOUNTER SYMPTOMS: COUGH: 1

## 2024-08-23 NOTE — PROGRESS NOTES
Assessment & Plan     (J06.9) Upper respiratory tract infection, unspecified type  (primary encounter diagnosis)  Comment: Patient has diminished lung sounds throughout with intermittent inspiratory wheezes at the bases bilaterally.  Plan: benzonatate (TESSALON) 200 MG capsule,         predniSONE (DELTASONE) 20 MG tablet,         azithromycin (ZITHROMAX) 250 MG tablet        Prescription sent to pharmacy                Subjective   Bailee is a 64 year old, presenting for the following health issues: Pt states that 2.5 weeks ago she started experiencing a productive cough of yellowish secretions, with nasal congestion of yellowish secretions, pt is taking benadryl and Tylenol that has helped. Pt states that she wakes up with a cough. Pt states that she is having a frontal HA. Pt denies any fevers, night sweats or chills. Pt states that she has sore throat.  Patient is constantly sucking on cough drops.    Provided information about upper respiratory infections in place to manage it at home.  Explained to patient about taking prednisone in the morning with food to prevent jitteriness and insomnia.  Patient has some financial disparity due to recent hospitalization bills that she is still paying.  Cough (She has a cough and was wheezing for the last two days. )      8/23/2024     9:56 AM   Additional Questions   Roomed by Leticia Moy MA   Accompanied by Self     Cough    History of Present Illness       Reason for visit:  Cough and breathing  Symptom onset:  1-2 weeks ago  Symptom intensity:  Moderate  Symptom progression:  Worsening  Had these symptoms before:  Yes  Has tried/received treatment for these symptoms:  Yes  Previous treatment was successful:  Yes  Prior treatment description:  Z pack and Predizone  What makes it better:  The Rx I have listed She is missing 7 dose(s) of medications per week.                 Review of Systems  Constitutional, HEENT, cardiovascular, pulmonary, gi and gu systems are  "negative, except as otherwise noted.      Objective    BP (!) 142/74 (BP Location: Right arm, Patient Position: Sitting, Cuff Size: Adult Small)   Pulse (!) 123   Temp 98.3  F (36.8  C) (Oral)   Resp 16   Ht 1.626 m (5' 4\")   Wt 41.2 kg (90 lb 14.4 oz)   LMP  (LMP Unknown)   SpO2 97%   BMI 15.60 kg/m    Body mass index is 15.6 kg/m .  Physical Exam   GENERAL: alert and no distress  HENT: normal cephalic/atraumatic, right ear: normal: no effusions, no erythema, normal landmarks, left ear: occluded with wax, nose and mouth without ulcers or lesions, rhinorrhea yellow, oral mucous membranes moist, tonsillar hypertrophy, tonsillar erythema, sinuses: frontal tenderness on bilaterally, and erythematous oropharynx  NECK: no adenopathy, no asymmetry, masses, or scars  RESP: inspiratory wheezes bibasilar and decreased breath sounds throughout  CV: regular rate and rhythm, normal S1 S2, no S3 or S4, no murmur, click or rub, no peripheral edema, initially heart rate was 123 due to just taking her albuterol inhaler heart rate retaken, and was 84  ABDOMEN: soft, nontender, no hepatosplenomegaly, no masses and bowel sounds normal  MS: no gross musculoskeletal defects noted, no edema  SKIN: no suspicious lesions or rashes  NEURO: Normal strength and tone, mentation intact and speech normal  PSYCH: mentation appears normal, affect normal/bright            Signed Electronically by: MICKIE Tillman CNP    "

## 2024-09-01 DIAGNOSIS — J01.90 ACUTE SINUSITIS WITH SYMPTOMS > 10 DAYS: ICD-10-CM

## 2024-09-03 RX ORDER — IPRATROPIUM BROMIDE 17 UG/1
2 AEROSOL, METERED RESPIRATORY (INHALATION) EVERY 6 HOURS
Qty: 12.9 G | Refills: 0 | Status: SHIPPED | OUTPATIENT
Start: 2024-09-03 | End: 2024-10-02

## 2024-09-26 DIAGNOSIS — J44.1 COPD EXACERBATION (H): ICD-10-CM

## 2024-09-27 RX ORDER — FLUCONAZOLE 100 MG/1
100 TABLET ORAL DAILY
Qty: 10 TABLET | Refills: 0 | Status: SHIPPED | OUTPATIENT
Start: 2024-09-27

## 2024-09-30 ENCOUNTER — MYC MEDICAL ADVICE (OUTPATIENT)
Dept: INTERNAL MEDICINE | Facility: CLINIC | Age: 64
End: 2024-09-30
Payer: COMMERCIAL

## 2024-09-30 DIAGNOSIS — J42 CHRONIC BRONCHITIS, UNSPECIFIED CHRONIC BRONCHITIS TYPE (H): Primary | ICD-10-CM

## 2024-10-02 DIAGNOSIS — J01.90 ACUTE SINUSITIS WITH SYMPTOMS > 10 DAYS: ICD-10-CM

## 2024-10-02 RX ORDER — IPRATROPIUM BROMIDE 17 UG/1
2 AEROSOL, METERED RESPIRATORY (INHALATION) EVERY 6 HOURS
Qty: 12.9 G | Refills: 0 | Status: SHIPPED | OUTPATIENT
Start: 2024-10-02 | End: 2024-10-25

## 2024-10-25 DIAGNOSIS — J01.90 ACUTE SINUSITIS WITH SYMPTOMS > 10 DAYS: ICD-10-CM

## 2024-10-25 RX ORDER — IPRATROPIUM BROMIDE 17 UG/1
2 AEROSOL, METERED RESPIRATORY (INHALATION) EVERY 6 HOURS
Qty: 12.9 G | Refills: 0 | Status: SHIPPED | OUTPATIENT
Start: 2024-10-25

## 2024-11-17 DIAGNOSIS — J01.90 ACUTE SINUSITIS WITH SYMPTOMS > 10 DAYS: ICD-10-CM

## 2024-11-18 RX ORDER — IPRATROPIUM BROMIDE 17 UG/1
2 AEROSOL, METERED RESPIRATORY (INHALATION) EVERY 6 HOURS
Qty: 12.9 G | Refills: 0 | Status: SHIPPED | OUTPATIENT
Start: 2024-11-18

## 2024-11-19 ENCOUNTER — OFFICE VISIT (OUTPATIENT)
Dept: INTERNAL MEDICINE | Facility: CLINIC | Age: 64
End: 2024-11-19
Payer: COMMERCIAL

## 2024-11-19 VITALS
DIASTOLIC BLOOD PRESSURE: 83 MMHG | HEIGHT: 63 IN | SYSTOLIC BLOOD PRESSURE: 149 MMHG | WEIGHT: 86.9 LBS | HEART RATE: 101 BPM | OXYGEN SATURATION: 97 % | BODY MASS INDEX: 15.4 KG/M2 | RESPIRATION RATE: 18 BRPM | TEMPERATURE: 98.5 F

## 2024-11-19 DIAGNOSIS — B96.89 ACUTE BACTERIAL RHINOSINUSITIS: Primary | ICD-10-CM

## 2024-11-19 DIAGNOSIS — R05.1 ACUTE COUGH: ICD-10-CM

## 2024-11-19 DIAGNOSIS — J42 CHRONIC BRONCHITIS, UNSPECIFIED CHRONIC BRONCHITIS TYPE (H): ICD-10-CM

## 2024-11-19 DIAGNOSIS — J01.90 ACUTE BACTERIAL RHINOSINUSITIS: Primary | ICD-10-CM

## 2024-11-19 PROCEDURE — 99214 OFFICE O/P EST MOD 30 MIN: CPT

## 2024-11-19 RX ORDER — PREDNISONE 20 MG/1
40 TABLET ORAL DAILY
Qty: 10 TABLET | Refills: 0 | Status: SHIPPED | OUTPATIENT
Start: 2024-11-19 | End: 2024-11-24

## 2024-11-19 RX ORDER — AZITHROMYCIN 250 MG/1
TABLET, FILM COATED ORAL
Qty: 6 TABLET | Refills: 0 | Status: SHIPPED | OUTPATIENT
Start: 2024-11-19 | End: 2024-11-24

## 2024-11-19 RX ORDER — BENZONATATE 200 MG/1
200 CAPSULE ORAL 3 TIMES DAILY PRN
Qty: 42 CAPSULE | Refills: 0 | Status: SHIPPED | OUTPATIENT
Start: 2024-11-19

## 2024-11-19 ASSESSMENT — ENCOUNTER SYMPTOMS: COUGH: 1

## 2024-11-19 NOTE — PROGRESS NOTES
Assessment & Plan   Problem List Items Addressed This Visit          Respiratory    Chronic bronchitis, unspecified chronic bronchitis type (H)     Other Visit Diagnoses       Acute bacterial rhinosinusitis    -  Primary    Relevant Medications    azithromycin (ZITHROMAX) 250 MG tablet    benzonatate (TESSALON) 200 MG capsule    predniSONE (DELTASONE) 20 MG tablet    Acute cough        Relevant Medications    benzonatate (TESSALON) 200 MG capsule                  MEDICATIONS:   Orders Placed This Encounter   Medications    azithromycin (ZITHROMAX) 250 MG tablet     Sig: Take 2 tablets (500 mg) by mouth daily for 1 day, THEN 1 tablet (250 mg) daily for 4 days.     Dispense:  6 tablet     Refill:  0    benzonatate (TESSALON) 200 MG capsule     Sig: Take 1 capsule (200 mg) by mouth 3 times daily as needed for cough.     Dispense:  42 capsule     Refill:  0    predniSONE (DELTASONE) 20 MG tablet     Sig: Take 2 tablets (40 mg) by mouth daily for 5 days.     Dispense:  10 tablet     Refill:  0     Medications Discontinued During This Encounter   Medication Reason    mometasone-formoterol (DULERA) 100-5 MCG/ACT inhaler           - Continue other medications without change      Subjective   Bailee is a 64 year old, presenting for the following health issues: Pt reports that 2 weeks ago she started with a frontal HA and a productive cough of yellowish green.  Patient reports that the cough keeps her up at night and needs to sleep in a recliner with the head elevated to prevent coughing.  Patient reports using her albuterol inhaler more often and using her Symbicort 3 times a day instead of 2 times a day.  Prescribed pt's throat is sore predominantly on the left side. Pt is fatigued. Pt doesn't know about any fevers because she has been on Tylenol and Robitussin and Bennadryl constantly. Pt reporting post nasal drip.  Patient presented to this office  8/23/2024 with the exact same symptoms.  Patient has a history of  "chronic bronchitis.  Patient endorses that her symptoms started with an upper respiratory infection that progressed to a second sickening of sinusitis that she is struggling to kick on her own.    Prescribed patient Jerilyn Araiza for her persistent productive cough.  Prescribed prednisone 40 mg x 5 days and explained to patient to take it with protein in the morning prevent jitteriness and insomnia.  Prescribed azithromycin and explained for her to take it with food probiotic or yogurt to reduce stomach upset and diarrhea.    Cough    Cough                   Review of Systems  Constitutional, HEENT, cardiovascular, pulmonary, gi and gu systems are negative, except as otherwise noted.      Objective    BP (!) 149/83 (BP Location: Right arm, Patient Position: Sitting)   Pulse 101   Temp 98.5  F (36.9  C) (Oral)   Resp 18   Ht 1.6 m (5' 3\")   Wt 39.4 kg (86 lb 14.4 oz)   LMP  (LMP Unknown)   SpO2 97%   BMI 15.39 kg/m    Body mass index is 15.39 kg/m .  Physical Exam   GENERAL: alert and no distress  HENT: normal cephalic/atraumatic, right ear: normal: no effusions, no erythema, normal landmarks, left ear: normal: no effusions, no erythema, normal landmarks and occluded with wax, nose and mouth without ulcers or lesions, nasal mucosa edematous , rhinorrhea yellow and green, oral mucous membranes moist, tonsillar hypertrophy, tonsillar erythema, and sinuses: frontal tenderness on bilaterally  NECK: no adenopathy, no asymmetry, masses, or scars  RESP: inspiratory wheezes R lower posterior, decreased breath sounds throughout, and uses accessory muscles of respiration due to COPD.  CV: regular rate and rhythm, normal S1 S2, no S3 or S4, no murmur, click or rub, no peripheral edema  ABDOMEN: soft, nontender, no hepatosplenomegaly, no masses and bowel sounds normal  MS: no gross musculoskeletal defects noted, no edema  SKIN: no suspicious lesions or rashes  NEURO: Normal strength and tone, mentation intact and " speech normal  PSYCH: mentation appears normal, affect normal/bright            Signed Electronically by: MICKIE Tillman CNP

## 2024-12-02 ENCOUNTER — MYC MEDICAL ADVICE (OUTPATIENT)
Dept: INTERNAL MEDICINE | Facility: CLINIC | Age: 64
End: 2024-12-02
Payer: COMMERCIAL

## 2024-12-21 DIAGNOSIS — J01.90 ACUTE SINUSITIS WITH SYMPTOMS > 10 DAYS: ICD-10-CM

## 2024-12-23 RX ORDER — IPRATROPIUM BROMIDE 17 UG/1
2 AEROSOL, METERED RESPIRATORY (INHALATION) EVERY 6 HOURS
Qty: 12.9 G | Refills: 0 | Status: SHIPPED | OUTPATIENT
Start: 2024-12-23

## 2025-02-01 DIAGNOSIS — J01.90 ACUTE SINUSITIS WITH SYMPTOMS > 10 DAYS: ICD-10-CM

## 2025-02-03 RX ORDER — IPRATROPIUM BROMIDE 17 UG/1
2 AEROSOL, METERED RESPIRATORY (INHALATION) EVERY 6 HOURS
Qty: 12.9 G | Refills: 0 | Status: SHIPPED | OUTPATIENT
Start: 2025-02-03

## 2025-04-07 ENCOUNTER — MYC MEDICAL ADVICE (OUTPATIENT)
Dept: INTERNAL MEDICINE | Facility: CLINIC | Age: 65
End: 2025-04-07
Payer: COMMERCIAL

## 2025-04-07 DIAGNOSIS — M62.838 MUSCLE SPASM: Primary | ICD-10-CM

## 2025-04-07 RX ORDER — CYCLOBENZAPRINE HCL 5 MG
5 TABLET ORAL 3 TIMES DAILY PRN
Qty: 30 TABLET | Refills: 0 | Status: SHIPPED | OUTPATIENT
Start: 2025-04-07

## 2025-04-07 NOTE — TELEPHONE ENCOUNTER
See patient message - requesting something to help her sleep.  Does patient need appointment?  E visit?    Pharmacy pended.

## 2025-04-07 NOTE — TELEPHONE ENCOUNTER
Recommend a VV. Muscle relaxant is not good treatment for her symptoms.   Recommend anxiety/depression treatment and counseling.

## 2025-06-05 ENCOUNTER — PATIENT OUTREACH (OUTPATIENT)
Dept: CARE COORDINATION | Facility: CLINIC | Age: 65
End: 2025-06-05
Payer: COMMERCIAL

## 2025-06-08 DIAGNOSIS — J44.1 COPD EXACERBATION (H): ICD-10-CM

## 2025-06-09 RX ORDER — FLUCONAZOLE 100 MG/1
100 TABLET ORAL DAILY
Qty: 10 TABLET | Refills: 0 | Status: SHIPPED | OUTPATIENT
Start: 2025-06-09

## 2025-08-16 ENCOUNTER — HEALTH MAINTENANCE LETTER (OUTPATIENT)
Age: 65
End: 2025-08-16